# Patient Record
Sex: FEMALE | Race: WHITE | NOT HISPANIC OR LATINO | URBAN - METROPOLITAN AREA
[De-identification: names, ages, dates, MRNs, and addresses within clinical notes are randomized per-mention and may not be internally consistent; named-entity substitution may affect disease eponyms.]

---

## 2019-02-22 ENCOUNTER — CONVERSION ENCOUNTER (OUTPATIENT)
Dept: SURGERY | Facility: CLINIC | Age: 59
End: 2019-02-22

## 2019-02-22 ENCOUNTER — OFFICE VISIT CONVERTED (OUTPATIENT)
Dept: SURGERY | Facility: CLINIC | Age: 59
End: 2019-02-22
Attending: NURSE PRACTITIONER

## 2019-03-11 ENCOUNTER — HOSPITAL ENCOUNTER (OUTPATIENT)
Dept: GASTROENTEROLOGY | Facility: HOSPITAL | Age: 59
Setting detail: HOSPITAL OUTPATIENT SURGERY
Discharge: HOME | End: 2019-03-11
Attending: SURGERY

## 2019-03-14 ENCOUNTER — HOSPITAL ENCOUNTER (OUTPATIENT)
Dept: OTHER | Facility: HOSPITAL | Age: 59
Discharge: HOME | End: 2019-03-14
Attending: PODIATRIST

## 2019-03-14 LAB
ANION GAP SERPL CALC-SCNC: 19 MMOL/L (ref 8–19)
BASOPHILS # BLD AUTO: 0.08 10*3/UL (ref 0–0.2)
BASOPHILS NFR BLD AUTO: 0.9 % (ref 0–3)
BUN SERPL-MCNC: 15 MG/DL (ref 5–25)
BUN/CREAT SERPL: 14 {RATIO} (ref 6–20)
CALCIUM SERPL-MCNC: 9.6 MG/DL (ref 8.7–10.4)
CHLORIDE SERPL-SCNC: 104 MMOL/L (ref 99–111)
CONV ABS IMM GRAN: 0.09 10*3/UL (ref 0–0.2)
CONV CO2: 20 MMOL/L (ref 22–32)
CONV IMMATURE GRAN: 1 % (ref 0–1.8)
CREAT UR-MCNC: 1.11 MG/DL (ref 0.5–0.9)
DEPRECATED RDW RBC AUTO: 45.6 FL (ref 36.4–46.3)
EOSINOPHIL # BLD AUTO: 0.5 10*3/UL (ref 0–0.7)
EOSINOPHIL # BLD AUTO: 5.6 % (ref 0–7)
ERYTHROCYTE [DISTWIDTH] IN BLOOD BY AUTOMATED COUNT: 13.5 % (ref 11.7–14.4)
GFR SERPLBLD BASED ON 1.73 SQ M-ARVRAT: 55 ML/MIN/{1.73_M2}
GLUCOSE SERPL-MCNC: 156 MG/DL (ref 65–99)
HBA1C MFR BLD: 15.2 G/DL (ref 12–16)
HCT VFR BLD AUTO: 48.3 % (ref 37–47)
LYMPHOCYTES # BLD AUTO: 2.02 10*3/UL (ref 1–5)
MCH RBC QN AUTO: 29.3 PG (ref 27–31)
MCHC RBC AUTO-ENTMCNC: 31.5 G/DL (ref 33–37)
MCV RBC AUTO: 93.1 FL (ref 81–99)
MONOCYTES # BLD AUTO: 0.68 10*3/UL (ref 0.2–1.2)
MONOCYTES NFR BLD AUTO: 7.6 % (ref 3–10)
NEUTROPHILS # BLD AUTO: 5.54 10*3/UL (ref 2–8)
NEUTROPHILS NFR BLD AUTO: 62.2 % (ref 30–85)
NRBC CBCN: 0 % (ref 0–0.7)
OSMOLALITY SERPL CALC.SUM OF ELEC: 292 MOSM/KG (ref 273–304)
PLATELET # BLD AUTO: 352 10*3/UL (ref 130–400)
PMV BLD AUTO: 9.6 FL (ref 9.4–12.3)
POTASSIUM SERPL-SCNC: 4.2 MMOL/L (ref 3.5–5.3)
RBC # BLD AUTO: 5.19 10*6/UL (ref 4.2–5.4)
SODIUM SERPL-SCNC: 139 MMOL/L (ref 135–147)
VARIANT LYMPHS NFR BLD MANUAL: 22.7 % (ref 20–45)
WBC # BLD AUTO: 8.91 10*3/UL (ref 4.8–10.8)

## 2019-03-18 ENCOUNTER — OFFICE VISIT CONVERTED (OUTPATIENT)
Dept: SURGERY | Facility: CLINIC | Age: 59
End: 2019-03-18
Attending: SURGERY

## 2020-02-11 ENCOUNTER — HOSPITAL ENCOUNTER (OUTPATIENT)
Dept: URGENT CARE | Facility: CLINIC | Age: 60
Discharge: HOME | End: 2020-02-11
Attending: FAMILY MEDICINE

## 2020-06-01 ENCOUNTER — HOSPITAL ENCOUNTER (OUTPATIENT)
Dept: URGENT CARE | Facility: CLINIC | Age: 60
Discharge: HOME | End: 2020-06-01

## 2020-10-21 ENCOUNTER — OFFICE VISIT CONVERTED (OUTPATIENT)
Dept: INTERNAL MEDICINE | Facility: CLINIC | Age: 60
End: 2020-10-21
Attending: NURSE PRACTITIONER

## 2020-10-21 ENCOUNTER — HOSPITAL ENCOUNTER (OUTPATIENT)
Dept: OTHER | Facility: HOSPITAL | Age: 60
Discharge: HOME | End: 2020-10-21
Attending: NURSE PRACTITIONER

## 2020-10-21 ENCOUNTER — CONVERSION ENCOUNTER (OUTPATIENT)
Dept: INTERNAL MEDICINE | Facility: CLINIC | Age: 60
End: 2020-10-21

## 2020-10-21 LAB
ALBUMIN SERPL-MCNC: 3.8 G/DL (ref 3.5–5)
ALBUMIN/GLOB SERPL: 1 {RATIO} (ref 1.4–2.6)
ALP SERPL-CCNC: 237 U/L (ref 53–141)
ALT SERPL-CCNC: 152 U/L (ref 10–40)
ANION GAP SERPL CALC-SCNC: 21 MMOL/L (ref 8–19)
AST SERPL-CCNC: 209 U/L (ref 15–50)
BASOPHILS # BLD AUTO: 0.07 10*3/UL (ref 0–0.2)
BASOPHILS NFR BLD AUTO: 0.6 % (ref 0–3)
BILIRUB SERPL-MCNC: 0.68 MG/DL (ref 0.2–1.3)
BUN SERPL-MCNC: 4 MG/DL (ref 5–25)
BUN/CREAT SERPL: 4 {RATIO} (ref 6–20)
CALCIUM SERPL-MCNC: 10.1 MG/DL (ref 8.7–10.4)
CHLORIDE SERPL-SCNC: 82 MMOL/L (ref 99–111)
CONV ABS IMM GRAN: 0.07 10*3/UL (ref 0–0.2)
CONV CO2: 31 MMOL/L (ref 22–32)
CONV IMMATURE GRAN: 0.6 % (ref 0–1.8)
CONV TOTAL PROTEIN: 7.6 G/DL (ref 6.3–8.2)
CREAT UR-MCNC: 1.09 MG/DL (ref 0.5–0.9)
DEPRECATED RDW RBC AUTO: 47.8 FL (ref 36.4–46.3)
EOSINOPHIL # BLD AUTO: 0.07 10*3/UL (ref 0–0.7)
EOSINOPHIL # BLD AUTO: 0.6 % (ref 0–7)
ERYTHROCYTE [DISTWIDTH] IN BLOOD BY AUTOMATED COUNT: 14 % (ref 11.7–14.4)
FOLATE SERPL-MCNC: >20 NG/ML (ref 4.8–20)
GFR SERPLBLD BASED ON 1.73 SQ M-ARVRAT: 56 ML/MIN/{1.73_M2}
GLOBULIN UR ELPH-MCNC: 3.8 G/DL (ref 2–3.5)
GLUCOSE SERPL-MCNC: 123 MG/DL (ref 65–99)
HCT VFR BLD AUTO: 47.4 % (ref 37–47)
HGB BLD-MCNC: 16 G/DL (ref 12–16)
LYMPHOCYTES # BLD AUTO: 3.09 10*3/UL (ref 1–5)
LYMPHOCYTES NFR BLD AUTO: 25.1 % (ref 20–45)
MCH RBC QN AUTO: 31.6 PG (ref 27–31)
MCHC RBC AUTO-ENTMCNC: 33.8 G/DL (ref 33–37)
MCV RBC AUTO: 93.7 FL (ref 81–99)
MONOCYTES # BLD AUTO: 0.65 10*3/UL (ref 0.2–1.2)
MONOCYTES NFR BLD AUTO: 5.3 % (ref 3–10)
NEUTROPHILS # BLD AUTO: 8.35 10*3/UL (ref 2–8)
NEUTROPHILS NFR BLD AUTO: 67.8 % (ref 30–85)
NRBC CBCN: 0 % (ref 0–0.7)
OSMOLALITY SERPL CALC.SUM OF ELEC: 270 MOSM/KG (ref 273–304)
PLATELET # BLD AUTO: 447 10*3/UL (ref 130–400)
PMV BLD AUTO: 10.1 FL (ref 9.4–12.3)
POTASSIUM SERPL-SCNC: 2.6 MMOL/L (ref 3.5–5.3)
RBC # BLD AUTO: 5.06 10*6/UL (ref 4.2–5.4)
SODIUM SERPL-SCNC: 131 MMOL/L (ref 135–147)
T4 FREE SERPL-MCNC: 1.6 NG/DL (ref 0.9–1.8)
TSH SERPL-ACNC: 1.26 M[IU]/L (ref 0.27–4.2)
VIT B12 SERPL-MCNC: >2000 PG/ML (ref 211–911)
WBC # BLD AUTO: 12.3 10*3/UL (ref 4.8–10.8)

## 2020-10-22 LAB
IRON SATN MFR SERPL: 28 % (ref 20–55)
IRON SERPL-MCNC: 75 UG/DL (ref 60–170)
TIBC SERPL-MCNC: 270 UG/DL (ref 245–450)
TRANSFERRIN SERPL-MCNC: 189 MG/DL (ref 250–380)

## 2020-10-23 LAB — HCV AB SER DONR QL: <0.1 S/CO RATIO (ref 0–0.9)

## 2020-10-24 LAB
CMV IGG CSF IA-ACNC: >10 U/ML
CMV IGM SERPL IA-ACNC: 10 AU/ML
CONV EBV EARLY ANTIGEN: <5 U/ML (ref 0–10.9)
CONV EBV NUCLEAR ANTIGEN: 270 U/ML (ref 0–21.9)
CONV EPSTEIN BARR VIRAL CAPSID IGM: <10 U/ML (ref 0–43.9)
CONV EPSTEIN BARR VIRUS ANTIBODY TO VIRAL CAPSID IGG: 284 U/ML (ref 0–21.9)
EST. AVERAGE GLUCOSE BLD GHB EST-MCNC: 123 MG/DL
HBA1C MFR BLD: 5.9 % (ref 3.5–5.7)

## 2020-10-29 ENCOUNTER — HOSPITAL ENCOUNTER (OUTPATIENT)
Dept: CARDIOLOGY | Facility: HOSPITAL | Age: 60
Discharge: HOME | End: 2020-10-29
Attending: NURSE PRACTITIONER

## 2020-11-10 ENCOUNTER — OFFICE VISIT CONVERTED (OUTPATIENT)
Dept: INTERNAL MEDICINE | Facility: CLINIC | Age: 60
End: 2020-11-10
Attending: NURSE PRACTITIONER

## 2020-11-10 ENCOUNTER — HOSPITAL ENCOUNTER (OUTPATIENT)
Dept: GENERAL RADIOLOGY | Facility: HOSPITAL | Age: 60
Discharge: HOME | End: 2020-11-10
Attending: NURSE PRACTITIONER

## 2020-11-10 LAB
ALBUMIN SERPL-MCNC: 3.5 G/DL (ref 3.5–5)
ALBUMIN/GLOB SERPL: 1 {RATIO} (ref 1.4–2.6)
ALP SERPL-CCNC: 153 U/L (ref 53–141)
ALT SERPL-CCNC: 69 U/L (ref 10–40)
ANION GAP SERPL CALC-SCNC: 15 MMOL/L (ref 8–19)
AST SERPL-CCNC: 86 U/L (ref 15–50)
BASOPHILS # BLD AUTO: 0.09 10*3/UL (ref 0–0.2)
BASOPHILS NFR BLD AUTO: 1 % (ref 0–3)
BILIRUB SERPL-MCNC: 0.51 MG/DL (ref 0.2–1.3)
BUN SERPL-MCNC: 4 MG/DL (ref 5–25)
BUN/CREAT SERPL: 4 {RATIO} (ref 6–20)
CALCIUM SERPL-MCNC: 10.5 MG/DL (ref 8.7–10.4)
CHLORIDE SERPL-SCNC: 100 MMOL/L (ref 99–111)
CONV ABS IMM GRAN: 0.07 10*3/UL (ref 0–0.2)
CONV CO2: 26 MMOL/L (ref 22–32)
CONV IMMATURE GRAN: 0.7 % (ref 0–1.8)
CONV TOTAL PROTEIN: 6.9 G/DL (ref 6.3–8.2)
CREAT UR-MCNC: 1.06 MG/DL (ref 0.5–0.9)
DEPRECATED RDW RBC AUTO: 48.9 FL (ref 36.4–46.3)
EOSINOPHIL # BLD AUTO: 0.19 10*3/UL (ref 0–0.7)
EOSINOPHIL # BLD AUTO: 2 % (ref 0–7)
ERYTHROCYTE [DISTWIDTH] IN BLOOD BY AUTOMATED COUNT: 14.1 % (ref 11.7–14.4)
GFR SERPLBLD BASED ON 1.73 SQ M-ARVRAT: 58 ML/MIN/{1.73_M2}
GLOBULIN UR ELPH-MCNC: 3.4 G/DL (ref 2–3.5)
GLUCOSE SERPL-MCNC: 89 MG/DL (ref 65–99)
HCT VFR BLD AUTO: 39.1 % (ref 37–47)
HGB BLD-MCNC: 13 G/DL (ref 12–16)
LYMPHOCYTES # BLD AUTO: 2.33 10*3/UL (ref 1–5)
LYMPHOCYTES NFR BLD AUTO: 24.9 % (ref 20–45)
MAGNESIUM SERPL-MCNC: 1.66 MG/DL (ref 1.6–2.3)
MCH RBC QN AUTO: 31.3 PG (ref 27–31)
MCHC RBC AUTO-ENTMCNC: 33.2 G/DL (ref 33–37)
MCV RBC AUTO: 94.2 FL (ref 81–99)
MONOCYTES # BLD AUTO: 0.65 10*3/UL (ref 0.2–1.2)
MONOCYTES NFR BLD AUTO: 6.9 % (ref 3–10)
NEUTROPHILS # BLD AUTO: 6.04 10*3/UL (ref 2–8)
NEUTROPHILS NFR BLD AUTO: 64.5 % (ref 30–85)
NRBC CBCN: 0 % (ref 0–0.7)
OSMOLALITY SERPL CALC.SUM OF ELEC: 280 MOSM/KG (ref 273–304)
PLATELET # BLD AUTO: 501 10*3/UL (ref 130–400)
PMV BLD AUTO: 9.1 FL (ref 9.4–12.3)
POTASSIUM SERPL-SCNC: 3.7 MMOL/L (ref 3.5–5.3)
RBC # BLD AUTO: 4.15 10*6/UL (ref 4.2–5.4)
SODIUM SERPL-SCNC: 137 MMOL/L (ref 135–147)
WBC # BLD AUTO: 9.37 10*3/UL (ref 4.8–10.8)

## 2020-11-18 ENCOUNTER — CONVERSION ENCOUNTER (OUTPATIENT)
Dept: CARDIOLOGY | Facility: CLINIC | Age: 60
End: 2020-11-18

## 2020-11-18 ENCOUNTER — OFFICE VISIT CONVERTED (OUTPATIENT)
Dept: CARDIOLOGY | Facility: CLINIC | Age: 60
End: 2020-11-18
Attending: INTERNAL MEDICINE

## 2020-11-20 ENCOUNTER — OFFICE VISIT CONVERTED (OUTPATIENT)
Dept: SURGERY | Facility: CLINIC | Age: 60
End: 2020-11-20
Attending: NURSE PRACTITIONER

## 2020-12-11 ENCOUNTER — CONVERSION ENCOUNTER (OUTPATIENT)
Dept: INTERNAL MEDICINE | Facility: CLINIC | Age: 60
End: 2020-12-11

## 2020-12-11 ENCOUNTER — HOSPITAL ENCOUNTER (OUTPATIENT)
Dept: OTHER | Facility: HOSPITAL | Age: 60
Discharge: HOME | End: 2020-12-11
Attending: NURSE PRACTITIONER

## 2020-12-11 ENCOUNTER — OFFICE VISIT CONVERTED (OUTPATIENT)
Dept: INTERNAL MEDICINE | Facility: CLINIC | Age: 60
End: 2020-12-11
Attending: NURSE PRACTITIONER

## 2020-12-11 LAB
ALBUMIN SERPL-MCNC: 3.9 G/DL (ref 3.5–5)
ALBUMIN/GLOB SERPL: 1.2 {RATIO} (ref 1.4–2.6)
ALP SERPL-CCNC: 154 U/L (ref 53–141)
ALT SERPL-CCNC: 66 U/L (ref 10–40)
ANION GAP SERPL CALC-SCNC: 19 MMOL/L (ref 8–19)
AST SERPL-CCNC: 78 U/L (ref 15–50)
BASOPHILS # BLD AUTO: 0.06 10*3/UL (ref 0–0.2)
BASOPHILS NFR BLD AUTO: 0.8 % (ref 0–3)
BILIRUB SERPL-MCNC: 0.6 MG/DL (ref 0.2–1.3)
BUN SERPL-MCNC: 9 MG/DL (ref 5–25)
BUN/CREAT SERPL: 9 {RATIO} (ref 6–20)
CALCIUM SERPL-MCNC: 9.5 MG/DL (ref 8.7–10.4)
CHLORIDE SERPL-SCNC: 96 MMOL/L (ref 99–111)
CHOLEST SERPL-MCNC: 217 MG/DL (ref 107–200)
CHOLEST/HDLC SERPL: 3.5 {RATIO} (ref 3–6)
CONV ABS IMM GRAN: 0.03 10*3/UL (ref 0–0.2)
CONV CO2: 23 MMOL/L (ref 22–32)
CONV IMMATURE GRAN: 0.4 % (ref 0–1.8)
CONV TOTAL PROTEIN: 7.2 G/DL (ref 6.3–8.2)
CREAT UR-MCNC: 0.98 MG/DL (ref 0.5–0.9)
DEPRECATED RDW RBC AUTO: 50.9 FL (ref 36.4–46.3)
EOSINOPHIL # BLD AUTO: 0.16 10*3/UL (ref 0–0.7)
EOSINOPHIL # BLD AUTO: 2 % (ref 0–7)
ERYTHROCYTE [DISTWIDTH] IN BLOOD BY AUTOMATED COUNT: 14.2 % (ref 11.7–14.4)
GFR SERPLBLD BASED ON 1.73 SQ M-ARVRAT: >60 ML/MIN/{1.73_M2}
GLOBULIN UR ELPH-MCNC: 3.3 G/DL (ref 2–3.5)
GLUCOSE SERPL-MCNC: 84 MG/DL (ref 65–99)
HCT VFR BLD AUTO: 45.3 % (ref 37–47)
HDLC SERPL-MCNC: 62 MG/DL (ref 40–60)
HGB BLD-MCNC: 14.6 G/DL (ref 12–16)
LDLC SERPL CALC-MCNC: 132 MG/DL (ref 70–100)
LYMPHOCYTES # BLD AUTO: 1.92 10*3/UL (ref 1–5)
LYMPHOCYTES NFR BLD AUTO: 24.3 % (ref 20–45)
MCH RBC QN AUTO: 31.6 PG (ref 27–31)
MCHC RBC AUTO-ENTMCNC: 32.2 G/DL (ref 33–37)
MCV RBC AUTO: 98.1 FL (ref 81–99)
MONOCYTES # BLD AUTO: 0.55 10*3/UL (ref 0.2–1.2)
MONOCYTES NFR BLD AUTO: 7 % (ref 3–10)
NEUTROPHILS # BLD AUTO: 5.17 10*3/UL (ref 2–8)
NEUTROPHILS NFR BLD AUTO: 65.5 % (ref 30–85)
NRBC CBCN: 0 % (ref 0–0.7)
OSMOLALITY SERPL CALC.SUM OF ELEC: 276 MOSM/KG (ref 273–304)
PLATELET # BLD AUTO: 456 10*3/UL (ref 130–400)
PMV BLD AUTO: 10.3 FL (ref 9.4–12.3)
POTASSIUM SERPL-SCNC: 4 MMOL/L (ref 3.5–5.3)
RBC # BLD AUTO: 4.62 10*6/UL (ref 4.2–5.4)
SODIUM SERPL-SCNC: 134 MMOL/L (ref 135–147)
TRIGL SERPL-MCNC: 115 MG/DL (ref 40–150)
VLDLC SERPL-MCNC: 23 MG/DL (ref 5–37)
WBC # BLD AUTO: 7.89 10*3/UL (ref 4.8–10.8)

## 2020-12-12 LAB
T4 FREE SERPL-MCNC: 1.6 NG/DL (ref 0.9–1.8)
TSH SERPL-ACNC: 1.67 M[IU]/L (ref 0.27–4.2)

## 2020-12-15 ENCOUNTER — HOSPITAL ENCOUNTER (OUTPATIENT)
Dept: OTHER | Facility: HOSPITAL | Age: 60
Discharge: HOME | End: 2020-12-15
Attending: NURSE PRACTITIONER

## 2020-12-15 ENCOUNTER — OFFICE VISIT CONVERTED (OUTPATIENT)
Dept: INTERNAL MEDICINE | Facility: CLINIC | Age: 60
End: 2020-12-15
Attending: NURSE PRACTITIONER

## 2020-12-19 LAB
CONV LAST MENSTURAL PERIOD: NORMAL
SPECIMEN SOURCE: NORMAL
SPECIMEN SOURCE: NORMAL
THIN PREP CVX: NORMAL

## 2021-01-19 ENCOUNTER — OFFICE VISIT CONVERTED (OUTPATIENT)
Dept: INTERNAL MEDICINE | Facility: CLINIC | Age: 61
End: 2021-01-19
Attending: PHYSICIAN ASSISTANT

## 2021-01-19 ENCOUNTER — HOSPITAL ENCOUNTER (OUTPATIENT)
Dept: OTHER | Facility: HOSPITAL | Age: 61
Discharge: HOME | End: 2021-01-19
Attending: PHYSICIAN ASSISTANT

## 2021-01-19 LAB
ALBUMIN SERPL-MCNC: 4.3 G/DL (ref 3.5–5)
ALBUMIN/GLOB SERPL: 1.1 {RATIO} (ref 1.4–2.6)
ALP SERPL-CCNC: 134 U/L (ref 53–141)
ALT SERPL-CCNC: 48 U/L (ref 10–40)
ANION GAP SERPL CALC-SCNC: 14 MMOL/L (ref 8–19)
AST SERPL-CCNC: 50 U/L (ref 15–50)
BASOPHILS # BLD AUTO: 0.09 10*3/UL (ref 0–0.2)
BASOPHILS NFR BLD AUTO: 1.1 % (ref 0–3)
BILIRUB SERPL-MCNC: 0.56 MG/DL (ref 0.2–1.3)
BUN SERPL-MCNC: 10 MG/DL (ref 5–25)
BUN/CREAT SERPL: 11 {RATIO} (ref 6–20)
CALCIUM SERPL-MCNC: 9.9 MG/DL (ref 8.7–10.4)
CHLORIDE SERPL-SCNC: 92 MMOL/L (ref 99–111)
CHOLEST SERPL-MCNC: 250 MG/DL (ref 107–200)
CHOLEST/HDLC SERPL: 4.9 {RATIO} (ref 3–6)
CONV ABS IMM GRAN: 0.02 10*3/UL (ref 0–0.2)
CONV CO2: 31 MMOL/L (ref 22–32)
CONV IMMATURE GRAN: 0.3 % (ref 0–1.8)
CONV TOTAL PROTEIN: 8.2 G/DL (ref 6.3–8.2)
CREAT UR-MCNC: 0.87 MG/DL (ref 0.5–0.9)
DEPRECATED RDW RBC AUTO: 44.4 FL (ref 36.4–46.3)
EOSINOPHIL # BLD AUTO: 0.14 10*3/UL (ref 0–0.7)
EOSINOPHIL # BLD AUTO: 1.8 % (ref 0–7)
ERYTHROCYTE [DISTWIDTH] IN BLOOD BY AUTOMATED COUNT: 13.1 % (ref 11.7–14.4)
GFR SERPLBLD BASED ON 1.73 SQ M-ARVRAT: >60 ML/MIN/{1.73_M2}
GLOBULIN UR ELPH-MCNC: 3.9 G/DL (ref 2–3.5)
GLUCOSE SERPL-MCNC: 90 MG/DL (ref 65–99)
HCT VFR BLD AUTO: 49.1 % (ref 37–47)
HDLC SERPL-MCNC: 51 MG/DL (ref 40–60)
HGB BLD-MCNC: 16.6 G/DL (ref 12–16)
LDLC SERPL CALC-MCNC: 168 MG/DL (ref 70–100)
LYMPHOCYTES # BLD AUTO: 1.73 10*3/UL (ref 1–5)
LYMPHOCYTES NFR BLD AUTO: 22 % (ref 20–45)
MCH RBC QN AUTO: 31.3 PG (ref 27–31)
MCHC RBC AUTO-ENTMCNC: 33.8 G/DL (ref 33–37)
MCV RBC AUTO: 92.5 FL (ref 81–99)
MONOCYTES # BLD AUTO: 0.55 10*3/UL (ref 0.2–1.2)
MONOCYTES NFR BLD AUTO: 7 % (ref 3–10)
NEUTROPHILS # BLD AUTO: 5.35 10*3/UL (ref 2–8)
NEUTROPHILS NFR BLD AUTO: 67.8 % (ref 30–85)
NRBC CBCN: 0 % (ref 0–0.7)
OSMOLALITY SERPL CALC.SUM OF ELEC: 277 MOSM/KG (ref 273–304)
PLATELET # BLD AUTO: 386 10*3/UL (ref 130–400)
PMV BLD AUTO: 10.7 FL (ref 9.4–12.3)
POTASSIUM SERPL-SCNC: 3.4 MMOL/L (ref 3.5–5.3)
RBC # BLD AUTO: 5.31 10*6/UL (ref 4.2–5.4)
SODIUM SERPL-SCNC: 134 MMOL/L (ref 135–147)
TRIGL SERPL-MCNC: 153 MG/DL (ref 40–150)
TSH SERPL-ACNC: 1.16 M[IU]/L (ref 0.27–4.2)
VLDLC SERPL-MCNC: 31 MG/DL (ref 5–37)
WBC # BLD AUTO: 7.88 10*3/UL (ref 4.8–10.8)

## 2021-02-05 ENCOUNTER — HOSPITAL ENCOUNTER (OUTPATIENT)
Dept: OTHER | Facility: HOSPITAL | Age: 61
Discharge: HOME | End: 2021-02-05
Attending: PHYSICIAN ASSISTANT

## 2021-02-05 ENCOUNTER — HOSPITAL ENCOUNTER (OUTPATIENT)
Dept: MAMMOGRAPHY | Facility: HOSPITAL | Age: 61
Discharge: HOME | End: 2021-02-05
Attending: NURSE PRACTITIONER

## 2021-02-05 LAB
ANION GAP SERPL CALC-SCNC: 15 MMOL/L (ref 8–19)
BUN SERPL-MCNC: 9 MG/DL (ref 5–25)
BUN/CREAT SERPL: 9 {RATIO} (ref 6–20)
CALCIUM SERPL-MCNC: 9.8 MG/DL (ref 8.7–10.4)
CHLORIDE SERPL-SCNC: 92 MMOL/L (ref 99–111)
CONV CO2: 30 MMOL/L (ref 22–32)
CREAT UR-MCNC: 1.01 MG/DL (ref 0.5–0.9)
GFR SERPLBLD BASED ON 1.73 SQ M-ARVRAT: >60 ML/MIN/{1.73_M2}
GLUCOSE SERPL-MCNC: 108 MG/DL (ref 65–99)
OSMOLALITY SERPL CALC.SUM OF ELEC: 277 MOSM/KG (ref 273–304)
POTASSIUM SERPL-SCNC: 3.1 MMOL/L (ref 3.5–5.3)
SODIUM SERPL-SCNC: 134 MMOL/L (ref 135–147)

## 2021-02-09 ENCOUNTER — OFFICE VISIT CONVERTED (OUTPATIENT)
Dept: INTERNAL MEDICINE | Facility: CLINIC | Age: 61
End: 2021-02-09
Attending: NURSE PRACTITIONER

## 2021-02-09 ENCOUNTER — CONVERSION ENCOUNTER (OUTPATIENT)
Dept: INTERNAL MEDICINE | Facility: CLINIC | Age: 61
End: 2021-02-09

## 2021-02-17 ENCOUNTER — HOSPITAL ENCOUNTER (OUTPATIENT)
Dept: PREADMISSION TESTING | Facility: HOSPITAL | Age: 61
Discharge: HOME | End: 2021-02-17
Attending: SURGERY

## 2021-02-17 LAB — SARS-COV-2 RNA SPEC QL NAA+PROBE: NOT DETECTED

## 2021-02-22 ENCOUNTER — HOSPITAL ENCOUNTER (OUTPATIENT)
Dept: GASTROENTEROLOGY | Facility: HOSPITAL | Age: 61
Setting detail: HOSPITAL OUTPATIENT SURGERY
Discharge: HOME | End: 2021-02-22
Attending: SURGERY

## 2021-03-04 ENCOUNTER — HOSPITAL ENCOUNTER (OUTPATIENT)
Dept: URGENT CARE | Facility: CLINIC | Age: 61
Discharge: HOME | End: 2021-03-04
Attending: FAMILY MEDICINE

## 2021-03-09 ENCOUNTER — HOSPITAL ENCOUNTER (OUTPATIENT)
Dept: OTHER | Facility: HOSPITAL | Age: 61
Discharge: HOME | End: 2021-03-09
Attending: NURSE PRACTITIONER

## 2021-03-09 ENCOUNTER — OFFICE VISIT CONVERTED (OUTPATIENT)
Dept: INTERNAL MEDICINE | Facility: CLINIC | Age: 61
End: 2021-03-09
Attending: NURSE PRACTITIONER

## 2021-03-09 LAB
ALBUMIN SERPL-MCNC: 4.1 G/DL (ref 3.5–5)
ALBUMIN/GLOB SERPL: 1.1 {RATIO} (ref 1.4–2.6)
ALP SERPL-CCNC: 126 U/L (ref 53–141)
ALT SERPL-CCNC: 34 U/L (ref 10–40)
ANION GAP SERPL CALC-SCNC: 16 MMOL/L (ref 8–19)
AST SERPL-CCNC: 43 U/L (ref 15–50)
BASOPHILS # BLD AUTO: 0.08 10*3/UL (ref 0–0.2)
BASOPHILS NFR BLD AUTO: 1 % (ref 0–3)
BILIRUB SERPL-MCNC: 0.5 MG/DL (ref 0.2–1.3)
BUN SERPL-MCNC: 14 MG/DL (ref 5–25)
BUN/CREAT SERPL: 17 {RATIO} (ref 6–20)
CALCIUM SERPL-MCNC: 9.8 MG/DL (ref 8.7–10.4)
CHLORIDE SERPL-SCNC: 89 MMOL/L (ref 99–111)
CONV ABS IMM GRAN: 0.03 10*3/UL (ref 0–0.2)
CONV CO2: 32 MMOL/L (ref 22–32)
CONV IMMATURE GRAN: 0.4 % (ref 0–1.8)
CONV TOTAL PROTEIN: 7.7 G/DL (ref 6.3–8.2)
CREAT UR-MCNC: 0.81 MG/DL (ref 0.5–0.9)
DEPRECATED RDW RBC AUTO: 43.5 FL (ref 36.4–46.3)
EOSINOPHIL # BLD AUTO: 0.07 10*3/UL (ref 0–0.7)
EOSINOPHIL # BLD AUTO: 0.8 % (ref 0–7)
ERYTHROCYTE [DISTWIDTH] IN BLOOD BY AUTOMATED COUNT: 13.2 % (ref 11.7–14.4)
GFR SERPLBLD BASED ON 1.73 SQ M-ARVRAT: >60 ML/MIN/{1.73_M2}
GLOBULIN UR ELPH-MCNC: 3.6 G/DL (ref 2–3.5)
GLUCOSE SERPL-MCNC: 103 MG/DL (ref 65–99)
HCT VFR BLD AUTO: 49.4 % (ref 37–47)
HGB BLD-MCNC: 16.5 G/DL (ref 12–16)
LYMPHOCYTES # BLD AUTO: 2.26 10*3/UL (ref 1–5)
LYMPHOCYTES NFR BLD AUTO: 26.9 % (ref 20–45)
MCH RBC QN AUTO: 30.3 PG (ref 27–31)
MCHC RBC AUTO-ENTMCNC: 33.4 G/DL (ref 33–37)
MCV RBC AUTO: 90.6 FL (ref 81–99)
MONOCYTES # BLD AUTO: 0.58 10*3/UL (ref 0.2–1.2)
MONOCYTES NFR BLD AUTO: 6.9 % (ref 3–10)
NEUTROPHILS # BLD AUTO: 5.37 10*3/UL (ref 2–8)
NEUTROPHILS NFR BLD AUTO: 64 % (ref 30–85)
NRBC CBCN: 0 % (ref 0–0.7)
OSMOLALITY SERPL CALC.SUM OF ELEC: 279 MOSM/KG (ref 273–304)
PLATELET # BLD AUTO: 458 10*3/UL (ref 130–400)
PMV BLD AUTO: 10 FL (ref 9.4–12.3)
POTASSIUM SERPL-SCNC: 3.1 MMOL/L (ref 3.5–5.3)
RBC # BLD AUTO: 5.45 10*6/UL (ref 4.2–5.4)
SODIUM SERPL-SCNC: 134 MMOL/L (ref 135–147)
WBC # BLD AUTO: 8.39 10*3/UL (ref 4.8–10.8)

## 2021-03-12 ENCOUNTER — HOSPITAL ENCOUNTER (OUTPATIENT)
Dept: OTHER | Facility: HOSPITAL | Age: 61
Discharge: HOME | End: 2021-03-12
Attending: NURSE PRACTITIONER

## 2021-03-12 ENCOUNTER — CONVERSION ENCOUNTER (OUTPATIENT)
Dept: INTERNAL MEDICINE | Facility: CLINIC | Age: 61
End: 2021-03-12

## 2021-03-12 LAB
ALBUMIN SERPL-MCNC: 4.3 G/DL (ref 3.5–5)
ALBUMIN/GLOB SERPL: 1.4 {RATIO} (ref 1.4–2.6)
ALP SERPL-CCNC: 142 U/L (ref 53–141)
ALT SERPL-CCNC: 30 U/L (ref 10–40)
ANION GAP SERPL CALC-SCNC: 16 MMOL/L (ref 8–19)
AST SERPL-CCNC: 32 U/L (ref 15–50)
B-HEM STREP SPEC QL CULT: NEGATIVE
BASOPHILS # BLD AUTO: 0.08 10*3/UL (ref 0–0.2)
BASOPHILS NFR BLD AUTO: 0.9 % (ref 0–3)
BILIRUB SERPL-MCNC: 0.53 MG/DL (ref 0.2–1.3)
BUN SERPL-MCNC: 8 MG/DL (ref 5–25)
BUN/CREAT SERPL: 8 {RATIO} (ref 6–20)
CALCIUM SERPL-MCNC: 10 MG/DL (ref 8.7–10.4)
CHLORIDE SERPL-SCNC: 93 MMOL/L (ref 99–111)
CONV ABS IMM GRAN: 0.05 10*3/UL (ref 0–0.2)
CONV CO2: 32 MMOL/L (ref 22–32)
CONV IMMATURE GRAN: 0.5 % (ref 0–1.8)
CONV TOTAL PROTEIN: 7.4 G/DL (ref 6.3–8.2)
CREAT UR-MCNC: 1 MG/DL (ref 0.5–0.9)
DEPRECATED RDW RBC AUTO: 42.6 FL (ref 36.4–46.3)
EOSINOPHIL # BLD AUTO: 0.08 10*3/UL (ref 0–0.7)
EOSINOPHIL # BLD AUTO: 0.9 % (ref 0–7)
ERYTHROCYTE [DISTWIDTH] IN BLOOD BY AUTOMATED COUNT: 13.2 % (ref 11.7–14.4)
FLUAV RNA SPEC QL NAA+PROBE: NEGATIVE
FLUBV RNA SPEC QL NAA+PROBE: NEGATIVE
GFR SERPLBLD BASED ON 1.73 SQ M-ARVRAT: >60 ML/MIN/{1.73_M2}
GLOBULIN UR ELPH-MCNC: 3.1 G/DL (ref 2–3.5)
GLUCOSE SERPL-MCNC: 128 MG/DL (ref 65–99)
HCT VFR BLD AUTO: 49.2 % (ref 37–47)
HGB BLD-MCNC: 16.6 G/DL (ref 12–16)
LYMPHOCYTES # BLD AUTO: 1.97 10*3/UL (ref 1–5)
LYMPHOCYTES NFR BLD AUTO: 21 % (ref 20–45)
MCH RBC QN AUTO: 30.5 PG (ref 27–31)
MCHC RBC AUTO-ENTMCNC: 33.7 G/DL (ref 33–37)
MCV RBC AUTO: 90.3 FL (ref 81–99)
MONOCYTES # BLD AUTO: 0.79 10*3/UL (ref 0.2–1.2)
MONOCYTES NFR BLD AUTO: 8.4 % (ref 3–10)
NEUTROPHILS # BLD AUTO: 6.39 10*3/UL (ref 2–8)
NEUTROPHILS NFR BLD AUTO: 68.3 % (ref 30–85)
NRBC CBCN: 0 % (ref 0–0.7)
OSMOLALITY SERPL CALC.SUM OF ELEC: 286 MOSM/KG (ref 273–304)
PLATELET # BLD AUTO: 476 10*3/UL (ref 130–400)
PMV BLD AUTO: 9.8 FL (ref 9.4–12.3)
POTASSIUM SERPL-SCNC: 3.4 MMOL/L (ref 3.5–5.3)
RBC # BLD AUTO: 5.45 10*6/UL (ref 4.2–5.4)
SODIUM SERPL-SCNC: 138 MMOL/L (ref 135–147)
WBC # BLD AUTO: 9.36 10*3/UL (ref 4.8–10.8)

## 2021-03-15 LAB — EPO SERPL-ACNC: 7.9 MIU/ML (ref 2.6–18.5)

## 2021-03-17 LAB — BACTERIA SPEC AEROBE CULT: ABNORMAL

## 2021-04-09 ENCOUNTER — HOSPITAL ENCOUNTER (OUTPATIENT)
Dept: OTHER | Facility: HOSPITAL | Age: 61
Discharge: HOME | End: 2021-04-09
Attending: NURSE PRACTITIONER

## 2021-04-09 LAB
ALBUMIN SERPL-MCNC: 3.9 G/DL (ref 3.5–5)
ALBUMIN/GLOB SERPL: 1.1 {RATIO} (ref 1.4–2.6)
ALP SERPL-CCNC: 133 U/L (ref 53–141)
ALT SERPL-CCNC: 33 U/L (ref 10–40)
ANION GAP SERPL CALC-SCNC: 20 MMOL/L (ref 8–19)
AST SERPL-CCNC: 41 U/L (ref 15–50)
BILIRUB SERPL-MCNC: 0.58 MG/DL (ref 0.2–1.3)
BUN SERPL-MCNC: 7 MG/DL (ref 5–25)
BUN/CREAT SERPL: 7 {RATIO} (ref 6–20)
CALCIUM SERPL-MCNC: 9.7 MG/DL (ref 8.7–10.4)
CHLORIDE SERPL-SCNC: 98 MMOL/L (ref 99–111)
CONV CO2: 25 MMOL/L (ref 22–32)
CONV TOTAL PROTEIN: 7.5 G/DL (ref 6.3–8.2)
CREAT UR-MCNC: 0.95 MG/DL (ref 0.5–0.9)
GFR SERPLBLD BASED ON 1.73 SQ M-ARVRAT: >60 ML/MIN/{1.73_M2}
GLOBULIN UR ELPH-MCNC: 3.6 G/DL (ref 2–3.5)
GLUCOSE SERPL-MCNC: 188 MG/DL (ref 65–99)
OSMOLALITY SERPL CALC.SUM OF ELEC: 293 MOSM/KG (ref 273–304)
POTASSIUM SERPL-SCNC: 3.1 MMOL/L (ref 3.5–5.3)
SODIUM SERPL-SCNC: 140 MMOL/L (ref 135–147)

## 2021-04-19 ENCOUNTER — OFFICE VISIT CONVERTED (OUTPATIENT)
Dept: INTERNAL MEDICINE | Facility: CLINIC | Age: 61
End: 2021-04-19
Attending: NURSE PRACTITIONER

## 2021-04-19 ENCOUNTER — CONVERSION ENCOUNTER (OUTPATIENT)
Dept: INTERNAL MEDICINE | Facility: CLINIC | Age: 61
End: 2021-04-19

## 2021-04-19 ENCOUNTER — HOSPITAL ENCOUNTER (OUTPATIENT)
Dept: OTHER | Facility: HOSPITAL | Age: 61
Discharge: HOME | End: 2021-04-19
Attending: NURSE PRACTITIONER

## 2021-04-19 LAB
ALBUMIN SERPL-MCNC: 3.6 G/DL (ref 3.5–5)
ALBUMIN/GLOB SERPL: 1.1 {RATIO} (ref 1.4–2.6)
ALP SERPL-CCNC: 123 U/L (ref 53–141)
ALT SERPL-CCNC: 26 U/L (ref 10–40)
ANION GAP SERPL CALC-SCNC: 19 MMOL/L (ref 8–19)
APPEARANCE UR: CLEAR
AST SERPL-CCNC: 41 U/L (ref 15–50)
BILIRUB SERPL-MCNC: 0.3 MG/DL (ref 0.2–1.3)
BILIRUB UR QL STRIP: NEGATIVE
BILIRUB UR QL: NEGATIVE
BUN SERPL-MCNC: 7 MG/DL (ref 5–25)
BUN/CREAT SERPL: 8 {RATIO} (ref 6–20)
CALCIUM SERPL-MCNC: 9.3 MG/DL (ref 8.7–10.4)
CHLORIDE SERPL-SCNC: 94 MMOL/L (ref 99–111)
COLOR UR: YELLOW
COLOR UR: YELLOW
CONV BACTERIA: ABNORMAL
CONV CLARITY OF URINE: CLEAR
CONV CO2: 24 MMOL/L (ref 22–32)
CONV COLLECTION SOURCE (UA): ABNORMAL
CONV PROTEIN IN URINE BY AUTOMATED TEST STRIP: NORMAL
CONV TOTAL PROTEIN: 7 G/DL (ref 6.3–8.2)
CONV UROBILINOGEN IN URINE BY AUTOMATED TEST STRIP: 1 {EHRLICHU}/DL (ref 0.1–1)
CONV UROBILINOGEN IN URINE BY AUTOMATED TEST STRIP: NORMAL
CREAT UR-MCNC: 0.89 MG/DL (ref 0.5–0.9)
GFR SERPLBLD BASED ON 1.73 SQ M-ARVRAT: >60 ML/MIN/{1.73_M2}
GLOBULIN UR ELPH-MCNC: 3.4 G/DL (ref 2–3.5)
GLUCOSE SERPL-MCNC: 135 MG/DL (ref 65–99)
GLUCOSE UR QL: NEGATIVE
GLUCOSE UR QL: NEGATIVE MG/DL
HGB UR QL STRIP: ABNORMAL
HGB UR QL STRIP: NORMAL
KETONES UR QL STRIP: NEGATIVE
KETONES UR QL STRIP: NEGATIVE MG/DL
LEUKOCYTE ESTERASE UR QL STRIP: ABNORMAL
LEUKOCYTE ESTERASE UR QL STRIP: NORMAL
NITRITE UR QL STRIP: NEGATIVE
NITRITE UR QL STRIP: NEGATIVE
OSMOLALITY SERPL CALC.SUM OF ELEC: 276 MOSM/KG (ref 273–304)
PH UR STRIP.AUTO: 7 [PH]
PH UR STRIP.AUTO: 7.5 [PH] (ref 5–8)
POTASSIUM SERPL-SCNC: 3.7 MMOL/L (ref 3.5–5.3)
PROT UR QL: ABNORMAL MG/DL
RBC #/AREA URNS HPF: ABNORMAL /[HPF]
SODIUM SERPL-SCNC: 133 MMOL/L (ref 135–147)
SP GR UR: 1.01
SP GR UR: 1.01 (ref 1–1.03)
SQUAMOUS SPT QL MICRO: ABNORMAL /[HPF]
WBC #/AREA URNS HPF: ABNORMAL /[HPF]

## 2021-04-22 LAB
AMPICILLIN SUSC ISLT: >=32
AMPICILLIN+SULBAC SUSC ISLT: 16
BACTERIA UR CULT: ABNORMAL
BASOPHILS # BLD: 1 % (ref 0–3)
CEFAZOLIN SUSC ISLT: <=4
CEFEPIME SUSC ISLT: <=0.12
CEFTAZIDIME SUSC ISLT: <=1
CEFTRIAXONE SUSC ISLT: <=0.25
CIPROFLOXACIN SUSC ISLT: <=0.25
CONV ABS BANDS: 0 % (ref 1–5)
CONV SEGMENTED NEUTROPHILS: 62 % (ref 45–70)
EOSINOPHIL NFR BLD AUTO: 0 % (ref 0–7)
ERTAPENEM SUSC ISLT: <=0.12
GENTAMICIN SUSC ISLT: <=1
LEVOFLOXACIN SUSC ISLT: <=0.12
MONOCYTES NFR BLD MANUAL: 5 % (ref 3–10)
NITROFURANTOIN SUSC ISLT: <=16
NUC CELL # PRT MANUAL: 0 /100{WBCS}
PATHOLOGY REVIEW: NORMAL
PIP+TAZO SUSC ISLT: <=4
PLAT MORPH BLD: NORMAL
SMALL PLATELETS BLD QL SMEAR: ADEQUATE
TMP SMX SUSC ISLT: >=320
TOBRAMYCIN SUSC ISLT: <=1
VARIANT LYMPHS NFR BLD MANUAL: 32 % (ref 20–45)

## 2021-05-10 NOTE — H&P
History and Physical      Patient Name: Shanthi Akers   Patient ID: [id number]   Sex: Female   YOB: 1960    Primary Care Provider: Charo PATEL   Referring Provider: Charo PATEL    Visit Date: November 20, 2020    Provider: AMANDA Perez   Location: [facility]   Location Address: [address] [place] [address]  [id number]   Location Phone: [phone]          Chief Complaint  · Age 50 or over  · Here today for a pre-surgical colon screening visit  · Nausea  · Vomiting  · Difficulty Swallowing  · Personal History of Polyps  · Requesting EGD and Colonoscopy      History Of Present Illness  The patient is a 60 year old /White female presenting to the Surgical Specialist office on a referral from Charo PATEL.   Shanthi Akers needs to have a diagnostic colonoscopy and EGD.   Patient states that they have had a colonoscopy. 1 year ago   Patient currently complains of: N/V and difficulty swallowing   Patient Does not have family history of colon cancer.      Patient presents today on referral from Jefe Castillo for nausea & vomiting, dysphagia and for screening colonoscopy.  Patient reports that she has nausea and vomiting times last 3 months.  She reports that she vomits after eating.  Reports vomiting at random times during the day.  Unsure what is causing her vomiting.  She denies any abdominal pain, diarrhea, or rectal bleeding.  She reports that she was started on omeprazole and that did not help the vomiting.  Denies any family history of colorectal cancer.  Admits to history of colonic polyps.    3/19 EGD & Colonoscopy (Sp): Rogel's; Transverse - tubular adenoma; Rectum - 2 Tubulovillous adenomas.     4/14 EGD (Maria Ines): Fundic gland polyps; esophageal stricture with dilation; Rogel's.    8/13: Colonoscopy (Maria Ines): Rectum - Tubulovillous adenomas.    5/13: Colonoscopy (Maria Ines): Transverse - Tubulovillous adenoma; Rectum  - tubular adenoma.       Past Medical History  Disease Name Date Onset Notes   Allergic rhinitis, chronic --  --    Asthma --  --    Broken Bones --  --    Colon cancer screening --  --    Depression with anxiety --  --    Essential hypertension --  --    GERD --  --    Hyperlipidemia --  --    Migraine headache --  --    Psychiatric disorder --  --    Shortness of Breath --  --    Sinus trouble --  --          Past Surgical History  Procedure Name Date Notes   Colon --  --    EGD --  --          Medication List  Name Date Started Instructions   Advair Diskus 500-50 mcg/dose inhalation blister with device 10/21/2020 inhale 1 puff by inhalation route 2 times per day in the morning and evening approximately 12 hours apart   aspirin 81 mg oral tablet,chewable  chew 1 tablet (81 mg) by oral route once daily   biotin 5 mg oral capsule  take 1 capsule by oral route daily   Klonopin 1 mg oral tablet  take 1 tablet by oral route As needed   meclizine 25 mg oral tablet  take 1 tablet (25 mg) by oral route 3 times per day as needed   Miralax 17 gram oral powder in packet  take 1 packet (17 gram) mixed with 8 oz. water, juice, soda, coffee or tea by oral route BID as needed   Omega-3 Fish Oil 300-1,000 mg oral capsule  take 1 capsule by oral route daily   omeprazole 40 mg oral capsule,delayed release(DR/EC) 11/10/2020 take 1 capsule (40 mg) by oral route once daily before a meal   Paxil 40 mg oral tablet  take 1 tablet (40 mg) by oral route once daily   Prilosec OTC 20 mg oral tablet,delayed release (DR/EC)  take 1 tablet by oral route 2 times a day   Ventolin HFA 90 mcg/actuation inhalation HFA aerosol inhaler  inhale 2 puffs (180 mcg) by inhalation route every 6 hours   Vitamins and Minerals oral tablet  take 1 tablet by oral route daily for 60 days   Zyprexa oral  --          Allergy List  Allergen Name Date Reaction Notes   tetracycline --  rash --        Allergies Reconciled  Family Medical History  Disease Name  "Relative/Age Notes   Melanoma Brother/   --    -Father's Family History Unknown Father/   Father   Family history of stroke Mother/   --    Family history of heart attack Mother/   --          Reproductive History  Menstrual   Pregnancy Summary   Total Pregnancies: 0 Full Term: 0 Premature: 0   Ab Induced: 0 Ab Spontaneous: 0 Ectopics: 0   Multiples: 0 Livin         Social History  Finding Status Start/Stop Quantity Notes   Alcohol Current some day --/-- --  drinks daily; wine and beer   Caffeine Current every day --/-- --  drinks regularly; 1-2 times per day   Second hand smoke exposure Never --/-- --  no   Tobacco Never --/-- --  never a smoker         Review of Systems  · Constitutional  o Denies  o : fever, chills  · Eyes  o Denies  o : yellowish discoloration of eyes  · HENT  o Denies  o : difficulty swallowing  · Cardiovascular  o Denies  o : chest pain, chest pain on exertion  · Respiratory  o Denies  o : shortness of breath  · Gastrointestinal  o Admits  o : nausea, vomiting, dysphagia  o Denies  o : diarrhea, constipation  · Genitourinary  o Denies  o : abnormal color of urine  · Integument  o Denies  o : rash  · Neurologic  o Denies  o : tingling or numbness  · Musculoskeletal  o Denies  o : joint pain  · Endocrine  o Denies  o : weight gain, weight loss      Vitals  Date Time BP Position Site L\R Cuff Size HR RR TEMP (F) WT  HT  BMI kg/m2 BSA m2 O2 Sat FR L/min FiO2        2020 11:41 AM       16  181lbs 0oz 5'  7.5\" 27.93 1.98             Physical Examination  · Constitutional  o Appearance  o : well developed, well-nourished, patient in no apparent distress  · Head and Face  o Head  o :   § Inspection  § : atraumatic, normocephalic  o Face  o :   § Inspection  § : no facial lesions  · Eyes  o Conjunctivae  o : conjunctivae normal  o Sclerae  o : sclerae white  · Neck  o Inspection/Palpation  o : normal appearance, no masses or tenderness, trachea midline  · Respiratory  o Respiratory " Effort  o : breathing unlabored  · Skin and Subcutaneous Tissue  o General Inspection  o : no lesions present, no areas of discoloration, skin turgor normal, texture normal  · Neurologic  o Mental Status Examination  o :   § Orientation  § : grossly oriented to person, place and time  § Attention  § : attention normal, concentration abilities normal  § Fund of Knowledge  § : fund of knowledge within normal limits, patient aware of current events  o Gait and Station  o : normal gait, able to stand without difficulty  · Psychiatric  o Judgement and Insight  o : judgment and insight intact  o Mood and Affect  o : mood normal, affect appropriate              Assessment  · Difficulty in swallowing     787.20/R13.10  · Personal history of colonic polyps     V12.72/Z86.010  · Screening for colon cancer     V76.51/Z12.11  · Nausea & vomiting     787.01/R11.2  · Pre-op testing     V72.84/Z01.818    Problems Reconciled  Plan  · Orders  o Consent for Colonoscopy with Possible Biopsy - Possible risks/complications, benefits, and alternatives to surgical or invasive procedure have been explained to patient and/or legal guardian. -Patient has been evaluated and can tolerate anesthesia and/or sedation. Risks, benefits, and alternatives to anesthesia and sedation have been explained to patient and/or legal guardian. (93218) - V76.51/Z12.11, V12.72/Z86.010 - 02/22/2021  o Consent for Esophagogastroduodenoscopy (EGD) with dilation - Possible risks/complications, benefits, and alternatives to surgical or invasive procedure have been explained to patient and/or legal guardian. - Patient has been evaluated and can tolerate anesthesia and/or sedation. Risks, benefits, and alternatives to anesthesia and sedation have been explained to patient and/or legal guardian. (42837) - 787.20/R13.10, 787.01/R11.2 - 02/22/2021  o [facility] Pre-Op Covid-19 Screening (47354) - V72.84/Z01.818 - 02/17/2021   [facility] @  3:30  · Medications  o Medications have been Reconciled  o Transition of Care or Provider Policy  · Instructions  o Surgical Facility: [facility]  o Handouts Provided Pre-Procedure Instructions including date, time, and location of procedure.   o PLAN: Proceeed with colonoscopy. Patient understands risks/benefits and is willing to proceed.   o PLAN: Proceeed with EGD. Patient understands risks/benefits and is willing to proceed.   o ***Surgical Orders***  o RISK AND BENEFITS:  o Given these options, the patient has verbally expressed an understanding of the risks of the surgery and finds these risks acceptable. Will proceed with surgery as soon as possible.  o O.R. PREP: Per protocol   o IV: Per Anesthesia  o Please sign permit for: Colonoscopy with possible biopsies by Dr. Snowden.  o Please sign permit for: Esophagogastroduodenoscopy with possible biopsies and dilation by Dr. Snowden.  o The above History and Physical Examination has been completed within 30 days of admission.  o ***Patient Status***  o Outpatient  o Follow up in the in the office post procedure.  o Advised patient that she would need COVID-19 testing prior to procedure peer encourage patient self isolate in between testing and procedure. Patient verbalizes understands willing to proceed  o Electronically Identified Patient Education Materials Provided Electronically            Electronically Signed by: AMANDA Perez -Author on November 20, 2020 01:03:57 PM

## 2021-05-10 NOTE — H&P
"   History and Physical      Patient Name: Shanthi Akers   Patient ID: [id number]   Sex: Female   YOB: 1960    Referring Provider: Joaquim Spann MD    Visit Date: October 21, 2020    Provider: AMANDA Catherine   Location: [facility]   Location Address: [address] [place] [address], [address]  [place], [place]  [id number]   Location Phone: [phone]          Chief Complaint  · Est. care      History Of Present Illness  Shanthi Akers is a 60 year old /White female who presents for evaluation and treatment of:      Previous PCP: Romeo Mukherjee, [facility]   Last labs: Unsure  LMP: Ablation 20 years ago  PAP: 2019, normal, OB/GYN  Mammogram: 2014, denies family history of breast cancer  Hepatitis C screening: Never  Colonoscopy: 2019, repeat in 6 months, due  Influenza vaccination: Would like   Hepatitis A vaccination: Declines  Shingles vaccination: Declines  Pneumonia vaccination: Due at 67  Eye exam: 2017  Dental exam: 2019  Smoking history: Denies  Specialists: Dr. Lidia Deal, [place], Psychiatry; EPW, OB/GYN    Depression/Anxiety-  Managed with Inderal, Klonopin, Paxil, Zyprexa, and meclizine. Patient reports chronic vertigo, states her psychiatrist feels that this triggers her anxiety attacks. Patient reports she is well controlled with this regimen and has been on it for a while. Was started on Inderal due to tremor caused by Zyprexa. Denies SI/HI. Patient reports she feels very fatigued, states \"I feel like I am low on something\".     Syncope-  Patient reports recent syncopal episodes x 2 months. Occur 1-2 times per week, always after going from sitting to standing. Patient states she will take off at a walk, have a short episode of confusion and wake up on the floor. States this only seems to last 30 seconds to 1 minute. Denies changes in vision/speech/hearing/gait or worst headache of her. Denies shortness of breath, chest pain, loss of bowel/bladder " function, nausea, vomiting, diaphoresis. States she feels fine after she wakes back up. Does not seem to occur during times of increased anxiety or with swallowing, coughing, defecation. Is not exacerbated by increase in physical activity. Denies history of cardiovascular disease, heart attack or stroke.     Asthma-  Requesting refill on Advair.          Past Medical History  Disease Name Date Onset Notes   Allergic rhinitis, chronic --  --    Asthma --  --    Colon cancer screening --  --    GERD --  --    High blood pressure --  --    High cholesterol --  --          Past Surgical History  Procedure Name Date Notes   Colon --  --    EGD --  --          Medication List  Name Date Started Instructions   Inderal  --    Klonopin oral  --    Paxil oral  --    Vistaril oral  --    Zyprexa oral  --          Allergy List  Allergen Name Date Reaction Notes   tetracycline --  rash --          Family Medical History  Disease Name Relative/Age Notes   -Father's Family History Unknown Father/   Father         Social History  Finding Status Start/Stop Quantity Notes   Alcohol Current every day --/-- --  drinks daily; wine and beer   Caffeine Current every day --/-- --  drinks regularly; 1-2 times per day   Second hand smoke exposure Never --/-- --  no   Tobacco Never --/-- --  never a smoker         Review of Systems  · Constitutional  o Denies  o : fever, fatigue, weight loss, weight gain  · Eyes  o Denies  o : discharge from eye, impaired vision, blurred vision  · HENT  o Admits  o : vertigo  o Denies  o : headaches  · Cardiovascular  o Denies  o : lower extremity edema, chest pressure, palpitations  · Respiratory  o Denies  o : shortness of breath, wheezing, cough, dyspnea on exertion  · Gastrointestinal  o Denies  o : nausea, vomiting, diarrhea, constipation, abdominal pain  · Neurologic  o Admits  o : syncope  o Denies  o : altered mental status, tingling or numbness, seizures  · Psychiatric  o Admits  o : anxiety,  "depression  o Denies  o : suicidal ideation, homicidal ideation      Vitals  Date Time BP Position Site L\R Cuff Size HR RR TEMP (F) WT  HT  BMI kg/m2 BSA m2 O2 Sat FR L/min FiO2 HC       10/21/2020 10:52 /68 Sitting    93 - R 18 97 191lbs 6oz 5'  8\" 29.1 2.04 96 %  21%        10/21/2020 11:55 /56 Standing       10/21/2020 11:52 AM 98/62 Sitting       10/21/2020 11:50 AM 98/66 Supine                 Physical Examination  · Constitutional  o Appearance  o : no acute distress, well-nourished  · Head and Face  o Head  o :   § Inspection  § : atraumatic, normocephalic  · Eyes  o Eyes  o : extraocular movements intact, no scleral icterus, no conjunctival injection  · Ears, Nose, Mouth and Throat  o Ears  o :   § External Ears  § : normal  § Otoscopic Examination  § : tympanic membrane appearance within normal limits bilaterally  o Nose  o :   § Intranasal Exam  § : nares patent  o Oral Cavity  o :   § Oral Mucosa  § : moist mucous membranes  o Throat  o :   § Oropharynx  § : no inflammation or lesions present, tonsils within normal limits  · Neck  o Thyroid  o : gland size normal, nontender, no nodules or masses present on palpation, symmetric  · Respiratory  o Respiratory Effort  o : breathing comfortably, symmetric chest rise  o Auscultation of Lungs  o : clear to asculatation bilaterally, no wheezes, rales, or rhonchii  · Cardiovascular  o Heart  o :   § Auscultation of Heart  § : regular rate and rhythm, no murmurs, rubs, or gallops  o Peripheral Vascular System  o :   § Extremities  § : no edema  · Lymphatic  o Neck  o : no lymphadenopathy present  o Supraclavicular Nodes  o : no supraclavicular nodes  · Skin and Subcutaneous Tissue  o General Inspection  o : no lesions present, no areas of discoloration, skin turgor normal  · Neurologic  o Mental Status Examination  o :   § Orientation  § : grossly oriented to person, place and time  o Gait and Station  o :   § Gait Screening  § : normal " gait  · Psychiatric  o General  o : normal mood and affect              Assessment  · Annual physical exam     V70.0/Z00.00  Basic labs in clinic today. Encouraged routine dental and eye exams.  · Asthma     493.90/J45.909  Advair to pharmacy per patient request.  · Depression     311/F32.9  Depression/anxiety well controlled per patient, continue to follow up with psychiatry as scheduled for management of current medication regimen. She will seek medical attention immediately if she feels that her mental health is deteriorating. Denies SI/HI. Will continue to monitor.   · Fatigue     780.79/R53.83  · Screening for depression     V79.0/Z13.89  PHQ9 score of 11.  · Need for influenza vaccination     V04.81/Z23  Influenza vaccination in clinic today.   · Screening for lipid disorders     V77.91/Z13.220  Lipid panel in clinic today.  · Screening for colon cancer     V76.51/Z12.11  Colonoscopy order placed.  · Screening for cervical cancer     V76.2/Z12.4  Patient to schedule PAP smear.  · Visit for screening mammogram     V76.12/Z12.31  Order placed for mammogram.   · Syncopal episodes     780.2/R55  Potentially secondary to combination of Zyprexa and Inderal due to risk of orthostatic hypotension. Orthostatic vitals in clinic without concern. Neurological exam normal. EKG in clinic with prolonged QT interval. Will schedule for Holter and refer to cardiology for further evaluation, would consider tilt table testing. Basic labs, including CBC, CMP, thyroid. Increase water intake. Patient to monitor closely and proceed directly to the ED with worsening syncopal episodes, changes in vision/speech/hearing/gait, worst headache of her life. Could consider further neurological workup with MRI brain in the future if warranted. Discussed safety measures. Will follow up in one month, sooner if concerns arise.  · Screening for condition     V82.9/Z13.9  Hepatitis C screening in clinic today.  · Abnormal  EKG     794.31/R94.31    Problems Reconciled  Plan  · Orders  o CBC with Auto Diff [facility] (79727) - 780.79/R53.83 - 10/21/2020  o CMP [facility] (35820) - 780.79/R53.83 - 10/21/2020  o Iron panel (iron, TIBC, transferrin saturation) (72361, 59472, 59474) - 780.79/R53.83 - 10/21/2020  o EBV antibody panel (23904, 45840, 27956) - 780.79/R53.83 - 10/21/2020  o CMV ab panel (IgG, IgM) (17856) - 780.79/R53.83 - 10/21/2020  o Thyroid Profile (25934, 89710, THYII) - 780.79/R53.83 - 10/21/2020  o B12 Folate levels (B12FO) - 780.79/R53.83 - 10/21/2020  o ACO-18: Positive screen for clinical depression using a standardized tool and a follow-up plan documented () - V79.0/Z13.89 - 10/21/2020   phq9 score of 11.  o Immunization Admin Fee (Single) ([facility]) (22777) - V04.81/Z23 - 10/21/2020  o ACO-14: Influenza immunization administered or previously received () - V04.81/Z23 - 10/21/2020  o Lipid Panel [facility] (95637) - V77.91/Z13.220 - 10/21/2020  o COLONOSCOPY REFERRAL (COLON) - V76.51/Z12.11 - 10/21/2020   Dr. Snowden  o Screening Mammography; Bilateral 3D (81092, 36686, ) - V76.12/Z12.31 - 10/21/2020  o ACO-39: Current medications updated and reviewed (1159F, ) - - 10/21/2020  o ACO-18: Positive screen for clinical depression using a standardized tool and a follow-up plan documented () - - 10/21/2020  o ACO-14: Influenza immunization administered or previously received [facility] () - - 10/21/2020  o Holter Monitor 24 Hour [facility] (75439) - 794.31/R94.31 - 10/21/2020  o Hepatitis C antibody (71557) - V82.9/Z13.9 - 10/21/2020  o CARDIOLOGY CONSULTATION (CARDI) - 794.31/R94.31 - 10/21/2020  o Afluria Quadrivalent Influenza Vaccine Multidose Vial [facility] (80627) - V04.81/Z23 - 10/21/2020   Vaccine - Influenza; Dose: 0.5; Site: Right Deltoid; Route: Intramuscular; Date: 10/21/2020 11:56:00; Exp: 06/30/2021; Lot: [id number]; Mfg: [employer]; TradeName: Fluarix, quadrivalent, preservative free; Administered By: El  Yarelis MORRISSEY; Comment: Pt tolerated well. Left the office in stable condition. PAKO MORRISSEY.  · Medications  o Advair Diskus 500-50 mcg/dose inhalation blister with device   SIG: inhale 1 puff by inhalation route 2 times per day in the morning and evening approximately 12 hours apart   DISP: (1) Puff with 2 refills  Prescribed on 10/21/2020     o Medications have been Reconciled  o Transition of Care or Provider Policy  · Instructions  o Reviewed health maintenance flowsheet and updated information. Orders were placed and/or patient's response was documented.  o Discussed the need for therapy, either with a certified counselor, psychologist, and/or family . If no improvement is noted or worsening of their condition, return to office or ER. But also discussed with patient that if they are non-responsive to the type of medication they may need to see a psychiatrist for further evaluation and management.  o Depression Screen completed and scanned into the EMR under the designated folder within the patient's documents.  o Today's PHQ-9 result is 11  o Take all medications as prescribed/directed.  o Patient was educated/instructed on their diagnosis, treatment and medications prior to discharge from the clinic today.  o Patient instructed to seek medical attention urgently for new or worsening symptoms.  o Call the office with any concerns or questions.  o Chronic conditions reviewed and taken into consideration for today's treatment plan.  · Disposition  o Call or Return if symptoms worsen or persist.  o Follow up in 1 month  o Prescriptions sent to pharmacy  o Labs drawn in clinic  o Will call patient with results of labs  o Please schedule Well Woman Exam  · Referrals  o Inbound Referral/Transition            Electronically Signed by: AMANDA Catherine -Author on October 21, 2020 01:17:15 PM

## 2021-05-10 NOTE — H&P
History and Physical      Patient Name: Shanthi Akers   Patient ID: [id number]   Sex: Female   YOB: 1960    Primary Care Provider: Charo PATEL   Referring Provider: Charo PATEL    Visit Date: November 18, 2020    Provider: Dyllan Brooks MD   [facility] [facility]   Location Address: [address] [place] [address], [address]   NA[place] Watson  [id number]   Location Phone: [phone]          History Of Present Illness  Consult requested by: Charo PATEL   I saw Shanthi Akers in the office today. This is a 60 year old, /White female. She has no previous cardiac history. She recently, over the past month, has had intermittent episodes of dizziness, orthostasis and syncope after standing. Symptoms were severe, associated with standing, improved after falling. The patient eventually was hospitalized right at the end of October, spent about 4 days in the hospital. She was found to be severely dehydrated with severe hypokalemia, potassium 2.1. This was corrected with IV fluid and electrolyte supplementation. Her labs resolved as did her symptoms, and she has not had any further episodes since being discharged a couple weeks ago. During the hospital stay, she had an echocardiogram, which was normal. Recent Holter Monitor showed sinus rhythm with ectopic beats but no arrhythmias.   PAST MEDICAL HISTORY: includes hypertension; anxiety/depression; gastroesophageal reflux. PAST SURGICAL HISTORY: Colonoscopy; foot surgery.   PSYCHOSOCIAL HISTORY: Positive for mood changes and depression. She drinks alcohol on a daily basis. She previously used tobacco but quit. She is .   FAMILY HISTORY: Positive for hypertension and heart disease. Negative for diabetes.   CURRENT MEDICATIONS: include Klonopin 1 mg t.i.d. p.r.n.; Zyprexa 1 mg daily; Paxil 40 mg daily; Vistaril 25 mg p.r.n.; Omeprazole 40 mg daily; Meclizine 15 mg daily; aspirin 81 mg daily; Thera-Tab; prenatal vitamin;  "Biotin; fish oil; Vitamin D3. The dosage and frequency of the medications were reviewed with the patient.   ALLERGIES: Tetracycline.       Review of Systems  · Constitutional  o Admits  o : fatigue, recent weight changes   o Denies  o : good general health lately  · Eyes  o Admits  o : blurred vision  o Denies  o : double vision  · HENT  o Admits  o : hearing loss or ringing, chronic sinus problem  o Denies  o : swollen glands in neck  · Cardiovascular  o Admits  o : swelling (feet, ankles, hands), shortness of breath while walking or lying flat  o Denies  o : chest pain, palpitations (fast, fluttering, or skipping beats)  · Respiratory  o Admits  o : chronic or frequent cough, asthma or wheezing  o Denies  o : COPD  · Gastrointestinal  o Admits  o : nausea or vomiting  o Denies  o : ulcers  · Neurologic  o Admits  o : headaches  o Denies  o : lightheaded or dizzy, stroke  · Musculoskeletal  o Denies  o : joint pain, back pain  · Endocrine  o Denies  o : thyroid disease, diabetes, heat or cold intolerance, excessive thirst or urination  · Heme-Lymph  o Denies  o : bleeding or bruising tendency, anemia      Vitals  Date Time BP Position Site L\R Cuff Size HR RR TEMP (F) WT  HT  BMI kg/m2 BSA m2 O2 Sat FR L/min FiO2 HC       11/18/2020 11:19 /88 Sitting    114 - R   183lbs 0oz 5'  7\" 28.66 1.98       11/18/2020 11:19 /84 Sitting    112 - R   183lbs 0oz 5'  7\" 28.66 1.98             Physical Examination  · Constitutional  o Appearance  o : Overweight, white female, appears anxious at baseline, in no significant distress.  · Head and Face  o HEENT  o : No pallor, anicteric. Eyes normal. Moist mucous membranes.  · Neck  o Inspection/Palpation  o : Supple. No hepatosplenomegaly.  o Jugular Veins  o : No JVD. No carotid bruits.  · Respiratory  o Auscultation of Lungs  o : Clear to auscultation bilaterally. No crackles or wheezing.  · Cardiovascular  o Heart  o : S1, S2 is normally heard. No S3. No murmur, rubs, " or gallops. She is mildly tachycardic.  · Gastrointestinal  o Abdominal Examination  o : Soft, non-distended. No palpable hepatosplenomegaly. Bowel sounds heard in all four quadrants.  · Musculoskeletal  o General  o : Normal muscle tone and strength.  · Skin and Subcutaneous Tissue  o General Inspection  o : No skin rashes.  · Extremities  o Extremities  o : Warm and well perfused. Distal pulses present. No pitting pedal edema.     I reviewed her hospital records.  Her EKG showed sinus rhythm with non-specific ST changes.    Laboratory studies were reviewed.  Initial electrolytes showed a potassium of 2.1, which was corrected to 3.7.      Holter Monitor showed benign ectopy, sinus rhythm.    Echocardiogram showed normal biventricular function with no valvular abnormalities.               Assessment     Episodic syncope - By history, this was secondary to orthostasis due to dehydration and severe electrolyte      disturbances.  These were corrected at a recent hospitalization, and her symptoms currently have resolved.    Baseline EKG showed non-specific ST changes.  Her echocardiogram showed no structural abnormalities.  She was taken off of Propranolol given her orthostatic problems recently.  There is no evidence of underlying primary arrhythmia or cardiac disease at this point.             Plan     I agree with her current medical therapy.  The patient is stable from a cardiac standpoint.  No additional diagnostics are needed at this time.  I will follow her as needed in the future if problems arise.  She is agreeable with this plan.    SHAKIRA burns/mary           This note was transcribed by Migdalia Negrete.  dmd/cbd  The above service was transcribed by Migdalia Negrete, and I attest to the accuracy of the note.  CBD.             Electronically Signed by: Migdalia Negrete-, -Author on November 23, 2020 10:52:21 AM  Electronically Co-signed by: Dyllan Brooks MD -Reviewer on November 30, 2020  06:46:57 AM

## 2021-05-13 NOTE — PROGRESS NOTES
Progress Note      Patient Name: Shanthi Akers   Patient ID: [id number]   Sex: Female   YOB: 1960    Primary Care Provider: Charo PATEL   Referring Provider: Charo PATEL    Visit Date: December 11, 2020    Provider: AMANDA Catherine   Location: [facility]   Location Address: [address] [place] [address], [address]  [place], [place]  [id number]   Location Phone: [phone]          Chief Complaint  · Follow-up  · Hypokalemia  · Asthma       History Of Present Illness  Shanthi Akers is a 60 year old /White female who presents for evaluation and treatment of:      Asthma/allergic rhinitis-  Patient reports asthma symptoms have improved significantly with Advair. She has been having breakthrough allergy symptoms. Was previously on Singulair and this worked well for her.     Constipation-  Currently managed with Miralax BID. Scheduled for colonoscopy 2/2021. She would like a prescription for Colace, this has worked for her in the past.    Patient reports history of HTN and HLD, previously on medication through her previous PCP before she lost her insurance. She is not sure what she was on, this has been over 6 years ago. Denies chest pain, blurry vision, headache, leg swelling. Episodes of dizziness have resolved. She does not check her blood pressure at home.       Past Medical History  Disease Name Date Onset Notes   Allergic rhinitis, chronic --  --    Asthma --  --    Broken Bones --  --    Colon cancer screening --  --    Depression with anxiety --  --    Essential hypertension --  --    GERD --  --    Hyperlipidemia --  --    Migraine headache --  --    Psychiatric disorder --  --    Shortness of Breath --  --    Sinus trouble --  --          Past Surgical History  Procedure Name Date Notes   Colon --  --    EGD --  --          Medication List  Name Date Started Instructions   Advair Diskus 500-50 mcg/dose inhalation blister with device 10/21/2020 inhale 1  puff by inhalation route 2 times per day in the morning and evening approximately 12 hours apart   aspirin 81 mg oral tablet,chewable  chew 1 tablet (81 mg) by oral route once daily   biotin 5 mg oral capsule  take 1 capsule by oral route daily   Klonopin 1 mg oral tablet  take 1 tablet by oral route As needed   meclizine 25 mg oral tablet  take 1 tablet (25 mg) by oral route 3 times per day as needed   Miralax 17 gram oral powder in packet  take 1 packet (17 gram) mixed with 8 oz. water, juice, soda, coffee or tea by oral route BID as needed   Omega-3 Fish Oil 300-1,000 mg oral capsule  take 1 capsule by oral route daily   omeprazole 40 mg oral capsule,delayed release(DR/EC) 11/10/2020 take 1 capsule (40 mg) by oral route once daily before a meal   Paxil 40 mg oral tablet  take 1 tablet (40 mg) by oral route once daily   Prilosec OTC 20 mg oral tablet,delayed release (DR/EC)  take 1 tablet by oral route 2 times a day   Ventolin HFA 90 mcg/actuation inhalation HFA aerosol inhaler  inhale 2 puffs (180 mcg) by inhalation route every 6 hours   Vitamins and Minerals oral tablet  take 1 tablet by oral route daily for 60 days   Zyprexa oral  --          Allergy List  Allergen Name Date Reaction Notes   tetracycline --  rash --        Allergies Reconciled  Family Medical History  Disease Name Relative/Age Notes   Melanoma Brother/   --    -Father's Family History Unknown Father/   Father   Family history of stroke Mother/   --    Family history of heart attack Mother/   --          Reproductive History  Menstrual   Pregnancy Summary   Total Pregnancies: 0 Full Term: 0 Premature: 0   Ab Induced: 0 Ab Spontaneous: 0 Ectopics: 0   Multiples: 0 Livin         Social History  Finding Status Start/Stop Quantity Notes   Alcohol Current some day --/-- --  drinks daily; wine and beer   Caffeine Current every day --/-- --  drinks regularly; 1-2 times per day   Second hand smoke exposure Never --/-- --  no   Tobacco Never --/-- --  " never a smoker         Immunizations  NameDate Admin Mfg Trade Name Lot Number Route Inj VIS Given VIS Publication   [id number]/21/2020 SKB Fluarix, quadrivalent, preservative free [id number] IM RD 10/21/2020    Comments: Pt tolerated well. Left the office in stable condition. PAKO MORRISSEY.         Review of Systems  · Constitutional  o Denies  o : fever, fatigue, weight loss, weight gain  · Eyes  o Denies  o : discharge from eye, impaired vision, blurred vision  · HENT  o Denies  o : headaches, vertigo, lightheadedness  · Cardiovascular  o Denies  o : lower extremity edema, chest pressure, palpitations  · Respiratory  o Denies  o : shortness of breath, wheezing, cough, dyspnea on exertion  · Gastrointestinal  o Denies  o : nausea, vomiting, diarrhea, constipation, abdominal pain  · Allergic-Immunologic  o Admits  o : seasonal allergies      Vitals  Date Time BP Position Site L\R Cuff Size HR RR TEMP (F) WT  HT  BMI kg/m2 BSA m2 O2 Sat FR L/min FiO2 HC       11/18/2020 11:19 /88 Sitting    114 - R   183lbs 0oz 5'  7\" 28.66 1.98       11/20/2020 11:41 AM       16  181lbs 0oz 5'  7.5\" 27.93 1.98       12/11/2020 01:01 /90 Sitting    105 - R  97.5 168lbs 7oz 5'  7\" 26.38 1.9 96 %  21%          Physical Examination  · Constitutional  o Appearance  o : no acute distress, well-nourished  · Head and Face  o Head  o :   § Inspection  § : atraumatic, normocephalic  · Ears, Nose, Mouth and Throat  o Ears  o :   § External Ears  § : normal  o Nose  o :   § Intranasal Exam  § : nares patent  o Oral Cavity  o :   § Oral Mucosa  § : moist mucous membranes  · Respiratory  o Respiratory Effort  o : breathing comfortably, symmetric chest rise  o Auscultation of Lungs  o : clear to asculatation bilaterally, no wheezes, rales, or rhonchii  · Cardiovascular  o Heart  o :   § Auscultation of Heart  § : regular rate and rhythm, no murmurs, rubs, or gallops  o Peripheral Vascular System  o :   § Extremities  § : no edema  · Skin " and Subcutaneous Tissue  o General Inspection  o : no lesions present, no areas of discoloration, skin turgor normal  · Neurologic  o Mental Status Examination  o :   § Orientation  § : grossly oriented to person, place and time  o Gait and Station  o :   § Gait Screening  § : normal gait              Assessment  · Asthma     493.90/J45.909  Well controlled, continue Advair.  · Essential hypertension     401.9/I10  Mild elevation in clinic today, history of medication management per patient report. Patient to monitor blood pressure daily x 1 month, will call with consistent elevations greater than 130s/80s. Will follow up in one month to review log, sooner if concerns arise. Could consider medication management at that time. Labs in clinic today.  · Hyperlipidemia     272.4/E78.5  History of medication management. Lipid panel in clinic today.  · Allergic rhinitis     477.9/J30.9  Will start daily Singulair. Follow up in one month to assess medication effectiveness, sooner if concerns arise. Could consider starting Zyrtec at that time if warranted.   · Constipation     564.00/K59.00  Colace to pharmacy per patient request. Follow up for colonoscopy as scheduled. Will continue to monitor.     Problems Reconciled  Plan  · Orders  o CBC with Auto Diff [facility] ([id number]) - 401.9/I10 - 12/11/2020  o CMP [facility] ([id number]) - 272.4/E78.5 - 12/11/2020  o Lipid Panel [facility] ([id number]) - 272.4/E78.5 - 12/11/2020  o ACO-39: Current medications updated and reviewed (1159F, ) - - 12/11/2020  · Medications  o Singulair 10 mg oral tablet   SIG: take 1 tablet (10 mg) by oral route once daily in the evening   DISP: (30) Tablet with 1 refills  Prescribed on 12/11/2020     o Colace 100 mg oral capsule   SIG: take 1 capsule (100 mg) by oral route once daily at bedtime as needed   DISP: (30) Capsule with 2 refills  Prescribed on 12/11/2020     o Medications have been Reconciled  o Transition of Care or Provider Policy  · Instructions  o Patient advised  to monitor blood pressure (B/P) at home and journal readings. Patient informed that a B/P reading at home of more than 130/80 is considered hypertension. For readings greater pvow255/90 or higher patient is advised to follow up in the office with readings for management. Patient advised to limit sodium intake.  o Advised that cheeses and other sources of dairy fats, animal fats, fast food, and the extras (candy, pastries, pies, doughnuts and cookies) all contain LDL raising nutrients. Advised to increase fruits, vegetables, whole grains, and to monitor portion sizes.   o Take all medications as prescribed/directed.  o Patient was educated/instructed on their diagnosis, treatment and medications prior to discharge from the clinic today.  o Patient instructed to seek medical attention urgently for new or worsening symptoms.  o Call the office with any concerns or questions.  o Chronic conditions reviewed and taken into consideration for today's treatment plan.  · Disposition  o Call or Return if symptoms worsen or persist.  o Follow up in 1 month  o Prescriptions sent to pharmacy  o Labs drawn in clinic  o Will call patient with results of labs            Electronically Signed by: AMANDA Catherine -Author on December 11, 2020 02:35:10 PM

## 2021-05-13 NOTE — PROGRESS NOTES
Progress Note      Patient Name: Shanthi Akers   Patient ID: [id number]   Sex: Female   YOB: 1960    Primary Care Provider: Charo PATEL   Referring Provider: Charo PATEL    Visit Date: November 10, 2020    Provider: AMANDA Catherine   Location: [facility]   Location Address: [address] [place] [address]  [id number]   Location Phone: [phone]          Chief Complaint  · Follow up office visit within 14 calendar days of discharge from inpatient status (moderate complexity).  · Syncope      History Of Present Illness  FOLLOW UP OFFICE VISIT WITHIN 14 CALENDAR DAYS OF INPATIENT STATUS (MODERATE COMPLEXITY)  Shanthi Akers presents to office for follow up post discharge from inpatient status within 14 calendar days. Patient was contacted within 2 business days via phone conversation. Documentation of that phone contact is present in the patient's electronic chart. Patient was admitted to inpatient facility on 11/01/2020 and discharged 11/02/2020 due to: Syncope and dehydration.   Admitting MD: Gilbert Carmona MD   Her discharge summary has been reviewed and placed in the patient's electronic chart.   Patient's problem list is: Allergic rhinitis, chronic, Asthma, Depression with anxiety, GERD, Migraine headache, Psychiatric disorder, Shortness of Breath, and Sinus trouble   Patient's outpatient medication list has been reconcilled with the medication list from the discharge summary and has been reviewed with the patient. Current medication list is: Advair Diskus 500-50 mcg/dose inhalation blister with device, aspirin 81 mg oral tablet,chewable, biotin 5 mg oral capsule, desonide 0.05 % topical cream, Klonopin 1 mg oral tablet, meclizine 25 mg oral tablet, Miralax 17 gram oral powder in packet, Omega-3 Fish Oil 300-1,000 mg oral capsule, Paxil 40 mg oral tablet, Prilosec OTC 20 mg oral tablet,delayed release (DR/EC), Ventolin HFA 90 mcg/actuation  inhalation HFA aerosol inhaler, Vitamins and Minerals oral tablet, and Zyprexa oral   Shanthi Akers is a 60 year old /White female who presents for evaluation and treatment of:      Patient evaluated at [facility] ED, admitted x 4 days for syncope with hypokalemia, hypomagnesemia, and hyponatremia. Was diagnosed with dehydration, electrolyte imbalances, alcohol use disorder and polypharmacy due to psych medications. Patient states since discharge she feels much better, is no longer having syncopal or dizzy episodes. She has been feeling more short of breath and fatigued. She is due to drop off Holter monitor today, scheduled with cardiology Dr. Brooks next week. Denies sudden onset of shortness of breath, chest pain, calf pain/erythema/edema/warmth, palpitations, coughing, diaphoresis. Does report an increase in GERD, would like something other than Nexium to try. Patient reports she drinks daily, typically 3-4 drinks. She has cut down to 2-3 since discharge. Is not interested in complete cessation, typically drinks a wine cooler and a couple glasses of wine.       Past Medical History  Disease Name Date Onset Notes   Allergic rhinitis, chronic --  --    Asthma --  --    Broken Bones --  --    Colon cancer screening --  --    Depression with anxiety --  --    Essential hypertension --  --    GERD --  --    Hyperlipidemia --  --    Migraine headache --  --    Psychiatric disorder --  --    Shortness of Breath --  --    Sinus trouble --  --          Past Surgical History  Procedure Name Date Notes   Colon --  --    EGD --  --          Medication List  Name Date Started Instructions   Advair Diskus 500-50 mcg/dose inhalation blister with device 10/21/2020 inhale 1 puff by inhalation route 2 times per day in the morning and evening approximately 12 hours apart   aspirin 81 mg oral tablet,chewable  chew 1 tablet (81 mg) by oral route once daily   biotin 5 mg oral capsule  take 1 capsule by oral route daily    Klonopin 1 mg oral tablet  take 1 tablet by oral route As needed   meclizine 25 mg oral tablet  take 1 tablet (25 mg) by oral route 3 times per day as needed   Miralax 17 gram oral powder in packet  take 1 packet (17 gram) mixed with 8 oz. water, juice, soda, coffee or tea by oral route BID as needed   Omega-3 Fish Oil 300-1,000 mg oral capsule  take 1 capsule by oral route daily   Paxil 40 mg oral tablet  take 1 tablet (40 mg) by oral route once daily   Prilosec OTC 20 mg oral tablet,delayed release (DR/EC)  take 1 tablet by oral route 2 times a day   Ventolin HFA 90 mcg/actuation inhalation HFA aerosol inhaler  inhale 2 puffs (180 mcg) by inhalation route every 6 hours   Vitamins and Minerals oral tablet  take 1 tablet by oral route daily for 60 days   Zyprexa oral  --          Allergy List  Allergen Name Date Reaction Notes   tetracycline --  rash --          Family Medical History  Disease Name Relative/Age Notes   Melanoma Brother/   --    -Father's Family History Unknown Father/   Father   Family history of stroke Mother/   --    Family history of heart attack Mother/   --          Reproductive History  Menstrual   Pregnancy Summary   Total Pregnancies: 0 Full Term: 0 Premature: 0   Ab Induced: 0 Ab Spontaneous: 0 Ectopics: 0   Multiples: 0 Livin         Social History  Finding Status Start/Stop Quantity Notes   Alcohol Current some day --/-- --  drinks daily; wine and beer   Caffeine Current every day --/-- --  drinks regularly; 1-2 times per day   Second hand smoke exposure Never --/-- --  no   Tobacco Never --/-- --  never a smoker         Immunizations  NameDate Admin Mfg Trade Name Lot Number Route Inj VIS Given VIS Publication   [id number]/ SKB Fluarix, quadrivalent, preservative free [id number] IM RD 10/21/2020    Comments: Pt tolerated well. Left the office in stable condition. NE MA.         Review of Systems  · Constitutional  o Admits  o : fatigue  o Denies  o : fever, weight gain,  "weight loss  · Eyes  o Denies  o : discharge from eye, impaired vision, blurred vision  · HENT  o Denies  o : headaches, vertigo, lightheadedness  · Cardiovascular  o Denies  o : palpitation, chest pain, lower extremity edema  · Respiratory  o Admits  o : shortness of breath  o Denies  o : wheezing, dry cough, productive cough  · Gastrointestinal  o Admits  o : heartburn, reflux  o Denies  o : nausea, vomiting, diarrhea, constipation  · Genitourinary  o Denies  o : urgency, frequency, dysuria  · Integument  o Denies  o : rash, pigmentation changes, new skin lesions  · Neurologic  o Denies  o : unsteady gait, weakness, dizziness, H/A  · Heme-Lymph  o Denies  o : petechiae, lymph node enlargement or tenderness      Vitals  Date Time BP Position Site L\R Cuff Size HR RR TEMP (F) WT  HT  BMI kg/m2 BSA m2 O2 Sat FR L/min FiO2 HC       03/18/2019 01:26 PM       16  217lbs 0oz 5'  7.5\" 33.49 2.17       10/21/2020 10:52 /68 Sitting    93 - R 18 97 191lbs 6oz 5'  8\" 29.1 2.04 96 %  21%    11/10/2020 10:10 /96 Sitting    103 - R  97.8 187lbs 8oz 5'  8\" 28.51 2.02 98 %  21%          Physical Examination  · Constitutional  o Appearance  o : no acute distress, well-nourished  · Head and Face  o Head  o :   § Inspection  § : atraumatic, normocephalic  · Ears, Nose, Mouth and Throat  o Ears  o :   § External Ears  § : normal  o Nose  o :   § Intranasal Exam  § : nares patent  o Oral Cavity  o :   § Oral Mucosa  § : moist mucous membranes  · Neck  o Thyroid  o : gland size normal, nontender, no nodules or masses present on palpation, symmetric  · Respiratory  o Respiratory Effort  o : breathing comfortably, symmetric chest rise  o Auscultation of Lungs  o : clear to asculatation bilaterally, no wheezes, rales, or rhonchii  · Cardiovascular  o Heart  o :   § Auscultation of Heart  § : regular rate and rhythm, no murmurs, rubs, or gallops  o Peripheral Vascular System  o :   § Extremities  § : no " edema  · Lymphatic  o Neck  o : no lymphadenopathy present  o Supraclavicular Nodes  o : no supraclavicular nodes  · Skin and Subcutaneous Tissue  o General Inspection  o : no lesions present, no areas of discoloration, skin turgor normal  · Neurologic  o Mental Status Examination  o :   § Orientation  § : grossly oriented to person, place and time  o Gait and Station  o :   § Gait Screening  § : normal gait              Assessment  · Asthma     493.90/J45.909  · GERD (gastroesophageal reflux disease)     530.81/K21.9  Omeprazole to pharmacy. Will follow up in one month to assess medication effectiveness, sooner if concerns arise.  · Shortness of Breath     786.05/R06.02  No red flags for DVT. Likely secondary to deconditioning with recent hospital stay. Will repeat labs to rule out underlying electrolyte imbalance and send for CXR for further evaluation. She is to seek medical attention immediately with sudden onset of shortness of breath, chest pain, calf pain/erythema/edema/warmth, palpitations, coughing, diaphoresis. Continue inhalers as previously prescribed. Will follow up in one month, sooner if concerns arise.  · Hypokalemia     276.8/E87.6  Reversed while inpatient, repeat CMP.  · Hypomagnesemia     275.2/E83.42  Reversed while inpatient, repeat magnesium level.   · Hyponatremia     276.1/E87.1  Reversed while inpatient, repeat CMP.  · Elevated liver enzymes     790.5/R74.8  Noted on recent labs, repeat CMP.  · Leukocytosis     288.60/D72.829  Noted on recent labs, due for repeat CBC.   · Alcohol use     V49.89/Z72.89  Discussed risks of chronic alcohol use and likely cause of recently elevated LFTs. She is aware of risks, is not interested in cessation at this time. Will continue to monitor.     Problems Reconciled  Plan  · Orders  o Discharge medications reconciled with the current medication list (1111F) - - 11/10/2020  o CBC with Auto Diff [facility] (71623) - 276.8/E87.6, 276.1/E87.1, 790.5/R74.8,  288.60/D72.829 - 11/10/2020  o CMP [facility] ([id number]) - 276.8/E87.6, 275.2/E83.42, 276.1/E87.1, 790.5/R74.8 - 11/10/2020  o ACO-39: Current medications updated and reviewed (1159F, ) - - 11/10/2020  o Magnesium, serum (96781) - 275.2/E83.42 - 11/10/2020  o Chest (AP PA and Lateral) 2 views X-Ray [facility] ([id number]) - 786.05/R06.02 - 11/10/2020   to be done at [facility]  · Medications  o omeprazole 40 mg oral capsule,delayed release(DR/EC)   SIG: take 1 capsule (40 mg) by oral route once daily before a meal   DISP: (30) Capsule with 1 refills  Prescribed on 11/10/2020     o Medications have been Reconciled  o Transition of Care or Provider Policy  · Instructions  o Patient discharge summary has been reviewed and placed in patient's electronic medical record.  o Patient received a phone call from my office within 2 business days of discharge from inpatient status, and documented within the patient's chart.  o Also patient was seen (face to face) for follow up evaluation within 14 calendar days of discharge from inpatient status for a severe complexity issue.  o Patient was educated on their diagnosis, treatment and any medication changes while being evaluated today.  o Take all medications as prescribed/directed.  o Patient was educated/instructed on their diagnosis, treatment and medications prior to discharge from the clinic today.  o Patient instructed to seek medical attention urgently for new or worsening symptoms.  o Call the office with any concerns or questions.  o Chronic conditions reviewed and taken into consideration for today's treatment plan.  · Disposition  o Call or Return if symptoms worsen or persist.  o Follow up in 1 month  o Prescriptions sent to pharmacy  o Will call patient with results of labs  o Will call patient with results of imaging  o Radiograph to be performed outside of clinic            Electronically Signed by: AMANDA Catherine -Author on November 10, 2020 12:49:34 PM

## 2021-05-14 VITALS
HEIGHT: 67 IN | WEIGHT: 179.25 LBS | DIASTOLIC BLOOD PRESSURE: 92 MMHG | SYSTOLIC BLOOD PRESSURE: 108 MMHG | RESPIRATION RATE: 16 BRPM | HEART RATE: 122 BPM | TEMPERATURE: 96.6 F | OXYGEN SATURATION: 99 % | BODY MASS INDEX: 28.13 KG/M2

## 2021-05-14 VITALS
WEIGHT: 191.37 LBS | HEART RATE: 93 BPM | DIASTOLIC BLOOD PRESSURE: 68 MMHG | RESPIRATION RATE: 18 BRPM | TEMPERATURE: 97 F | HEIGHT: 68 IN | OXYGEN SATURATION: 96 % | SYSTOLIC BLOOD PRESSURE: 114 MMHG | BODY MASS INDEX: 29 KG/M2

## 2021-05-14 VITALS
BODY MASS INDEX: 28.42 KG/M2 | DIASTOLIC BLOOD PRESSURE: 96 MMHG | HEART RATE: 103 BPM | SYSTOLIC BLOOD PRESSURE: 134 MMHG | HEIGHT: 68 IN | TEMPERATURE: 97.8 F | WEIGHT: 187.5 LBS | OXYGEN SATURATION: 98 %

## 2021-05-14 VITALS
HEIGHT: 67 IN | HEART RATE: 114 BPM | WEIGHT: 183 LBS | SYSTOLIC BLOOD PRESSURE: 150 MMHG | DIASTOLIC BLOOD PRESSURE: 88 MMHG | BODY MASS INDEX: 28.72 KG/M2

## 2021-05-14 VITALS
HEART RATE: 79 BPM | WEIGHT: 164.37 LBS | BODY MASS INDEX: 25.8 KG/M2 | TEMPERATURE: 97.8 F | SYSTOLIC BLOOD PRESSURE: 122 MMHG | OXYGEN SATURATION: 100 % | DIASTOLIC BLOOD PRESSURE: 84 MMHG | HEIGHT: 67 IN

## 2021-05-14 VITALS
BODY MASS INDEX: 26.21 KG/M2 | HEIGHT: 67 IN | WEIGHT: 167 LBS | DIASTOLIC BLOOD PRESSURE: 66 MMHG | SYSTOLIC BLOOD PRESSURE: 114 MMHG | TEMPERATURE: 98.2 F | HEART RATE: 96 BPM | OXYGEN SATURATION: 98 %

## 2021-05-14 VITALS
TEMPERATURE: 97.8 F | HEIGHT: 67 IN | OXYGEN SATURATION: 97 % | BODY MASS INDEX: 25.64 KG/M2 | DIASTOLIC BLOOD PRESSURE: 80 MMHG | HEART RATE: 99 BPM | WEIGHT: 163.37 LBS | SYSTOLIC BLOOD PRESSURE: 130 MMHG

## 2021-05-14 VITALS
BODY MASS INDEX: 26.27 KG/M2 | TEMPERATURE: 97 F | HEIGHT: 67 IN | OXYGEN SATURATION: 99 % | WEIGHT: 167.37 LBS | SYSTOLIC BLOOD PRESSURE: 118 MMHG | DIASTOLIC BLOOD PRESSURE: 82 MMHG | HEART RATE: 102 BPM

## 2021-05-14 VITALS — HEIGHT: 67 IN | WEIGHT: 181 LBS | RESPIRATION RATE: 16 BRPM | BODY MASS INDEX: 28.41 KG/M2

## 2021-05-14 VITALS
SYSTOLIC BLOOD PRESSURE: 122 MMHG | WEIGHT: 168.44 LBS | HEART RATE: 105 BPM | HEIGHT: 67 IN | OXYGEN SATURATION: 96 % | BODY MASS INDEX: 26.44 KG/M2 | DIASTOLIC BLOOD PRESSURE: 90 MMHG | TEMPERATURE: 97.5 F

## 2021-05-14 VITALS — DIASTOLIC BLOOD PRESSURE: 56 MMHG | SYSTOLIC BLOOD PRESSURE: 100 MMHG

## 2021-05-14 NOTE — PROGRESS NOTES
Progress Note      Patient Name: Shanthi Akers   Patient ID: [id number]   Sex: Female   YOB: 1960    Primary Care Provider: Charo PATEL   Referring Provider: Charo PATEL    Visit Date: December 15, 2020    Provider: AMANDA Catherine   Location: [facility]   Location Address: [address] [place] [address], [facility]  [place], [place]  [id number]   Location Phone: [phone]          Chief Complaint  · Annual Exam  · PAP exam  · (Health Maintainence Information Reviewed Under Results)      History Of Present Illness  Last PAP Smear: 2019.   Date of Last Mammogram: 2018.   Date of Last Colonoscopy: 2019   No current complaints.      Last PAP: 2019  Last Mammogram: 2018  LMP: History of uterine ablation     Patient reports last PAP 2019, normal. Denies history of abnormal PAPs. Last mammogram 2018, normal. History of breast cysts. Denies vaginal lesion, discharge, dysuria or pain with sexual activity. Denies breast lumps, nipple discharge or changes in breast. Currently with breast tenderness, often occurs monthly around when her cycle should be. Denies family history of breast cancer. Patient is without gynecological concern at this time. Patient defers STD testing.       Past Medical History  Disease Name Date Onset Notes   Allergic rhinitis, chronic --  --    Asthma --  --    Broken Bones --  --    Colon cancer screening --  --    Depression with anxiety --  --    Essential hypertension --  --    GERD --  --    Hyperlipidemia --  --    Migraine headache --  --    Psychiatric disorder --  --    Shortness of Breath --  --    Sinus trouble --  --          Past Surgical History  Procedure Name Date Notes   Colon --  --    EGD --  --          Medication List  Name Date Started Instructions   Advair Diskus 500-50 mcg/dose inhalation blister with device 12/15/2020 inhale 1 puff by inhalation route 2 times per day in the morning and evening approximately 12 hours apart   aspirin 81  mg oral tablet,chewable  chew 1 tablet (81 mg) by oral route once daily   biotin 5 mg oral capsule  take 1 capsule by oral route daily   Colace 100 mg oral capsule 2020 take 1 capsule (100 mg) by oral route once daily at bedtime as needed   Klonopin 1 mg oral tablet  take 1 tablet by oral route As needed   meclizine 25 mg oral tablet  take 1 tablet (25 mg) by oral route 3 times per day as needed   Miralax 17 gram oral powder in packet  take 1 packet (17 gram) mixed with 8 oz. water, juice, soda, coffee or tea by oral route BID as needed   Omega-3 Fish Oil 300-1,000 mg oral capsule  take 1 capsule by oral route daily   omeprazole 40 mg oral capsule,delayed release(DR/EC) 11/10/2020 take 1 capsule (40 mg) by oral route once daily before a meal   Paxil 40 mg oral tablet  take 1 tablet (40 mg) by oral route once daily   Singulair 10 mg oral tablet 2020 take 1 tablet (10 mg) by oral route once daily in the evening   Ventolin HFA 90 mcg/actuation inhalation HFA aerosol inhaler  inhale 2 puffs (180 mcg) by inhalation route every 6 hours   Vitamins and Minerals oral tablet  take 1 tablet by oral route daily for 60 days   Zyprexa oral  --          Allergy List  Allergen Name Date Reaction Notes   tetracycline --  rash --        Allergies Reconciled  Family Medical History  Disease Name Relative/Age Notes   Melanoma Brother/   --    -Father's Family History Unknown Father/   Father   Family history of stroke Mother/   --    Family history of heart attack Mother/   --          Reproductive History  Menstrual   Pregnancy Summary   Total Pregnancies: 0 Full Term: 0 Premature: 0   Ab Induced: 0 Ab Spontaneous: 0 Ectopics: 0   Multiples: 0 Livin         Social History  Finding Status Start/Stop Quantity Notes   Alcohol Current some day --/-- --  drinks daily; wine and beer   Caffeine Current every day --/-- --  drinks regularly; 1-2 times per day   Second hand smoke exposure Never --/-- --  no   Tobacco Never --/--  "--  never a smoker         Immunizations  NameDate Admin Mfg Trade Name Lot Number Route Inj VIS Given VIS Publication   [id number]/21/2020 SKB Fluarix, quadrivalent, preservative free [id number] IM RD 10/21/2020    Comments: Pt tolerated well. Left the office in stable condition. PAKO MORRISSEY.         Review of Systems  · Constitutional  o Denies  o : appetite change, fatigue, weight gain, weight loss  · Breasts  o Admits  o : tenderness  o Denies  o : lumps, swelling, nipple discharge  · Cardiovascular  o Denies  o : chest pain, palpitations  · Respiratory  o Denies  o : frequent cough, shortness of breath, wheezing, apneas  · Gastrointestinal  o Denies  o : abdominal pain, vomiting, constipation, diarrhea  · Genitourinary  o Denies  o : dysuria, urinary frequency, urinary urgency, sexual dysfunction      Vitals  Date Time BP Position Site L\R Cuff Size HR RR TEMP (F) WT  HT  BMI kg/m2 BSA m2 O2 Sat FR L/min FiO2 HC       11/18/2020 11:19 /88 Sitting    114 - R   183lbs 0oz 5'  7\" 28.66 1.98       12/11/2020 01:01 /90 Sitting    105 - R  97.5 168lbs 7oz 5'  7\" 26.38 1.9 96 %  21%    12/15/2020 01:44 /92 Sitting    122 - R 16 96.6 179lbs 4oz 5'  7\" 28.07 1.96 99 %  21%          Physical Examination  · Constitutional  o Appearance  o : well-nourished, in no acute distress  · Respiratory  o Respiratory Effort  o : breathing unlabored  o Inspection of Chest  o : normal appearance  o Auscultation of Lungs  o : normal breath sounds throughout  · Cardiovascular  o Heart  o :   § Auscultation of Heart  § : regular rate and rhythm, no murmurs, gallops or rubs  · Breasts  o Inspection of Breasts  o : breasts symmetrical, no skin changes, no deformities present, no discharge present  o Palpation of Breasts, Axillae  o : no masses present on palpation; generalized tenderness on palpation  · Gastrointestinal  o Abdominal Examination  o : abdomen nontender to palpation, tone normal without rigidity or guarding, no " masses present, normal bowel sounds  · Genitourinary  o External Genitalia  o : no inflammation, no lesions present  o Vagina  o : normal vaginal vault, no discharge present, no inflammatory lesions present, no masses present  o Bladder  o : nontender to palpation  o Cervix  o : appearance healthy, no lesions present, nontender to palpation, no discharges, no bleeding present, normal midline position  o Uterus  o : nontender to palpation, no masses present, position midline/midplane  o Adnexa  o : no tenderness or masses present on bimanual examination  o Anus  o : no inflammation or lesions present  o Perineum  o : perineum within normal limits  · Lymphatic  o Axilla  o : no lymphadenopathy present  · Neurologic  o Mental Status Examination  o :   § Orientation  § : grossly oriented to person, place and time  o Gait and Station  o : normal gait, able to stand without difficulty  · Psychiatric  o Judgement and Insight  o : judgment and insight intact  o Mood and Affect  o : mood normal, affect appropriate              Assessment  · Routine gynecological examination     V72.31/Z01.419  Exam without concern. Breast tenderness likely secondary to hormonal changes, however patient to follow up for mammogram as scheduled for further evaluation. Will determine if further intervention is warranted based on results of PAP.  · Pap smear, as part of routine gynecological examination     V76.2/Z01.419    Problems Reconciled  Plan  · Orders  o Vaginal Screen (Candida, Gardnerella & Trichomonas) (60892) - V72.31/Z01.419 - 12/15/2020  o Pap smear (53640) - V76.2/Z01.419 - 12/15/2020  o ACO-39: Current medications updated and reviewed (1159F, ) - - 12/15/2020  · Medications  o Advair Diskus 500-50 mcg/dose inhalation blister with device   SIG: inhale 1 puff by inhalation route 2 times per day in the morning and evening approximately 12 hours apart   DISP: (1) Puff with 2 refills  Refilled on 12/15/2020     o Medications have  been Reconciled  o Transition of Care or Provider Policy  · Instructions  o **Pap Test/Liquid Based:   o Source:  o Cervix  o ********  o **Perform Reflex Human Papilloma Virus (HPV) High Risk on this Pap (If atypical squamous cells of the undetermined signifigcance (ASCUS)/Atypical Glandular Cells of undetermined significance (AGCUS): Low Grade Squamous Intraepitheal lesion (LGSIL): **  o No  o ********  o **Is this an annual PAP:  o Yes  o Counseled on monthly breast self exams.   o Counseled on diet and exercise.   o Counseled on weight-bearing exercise.  o Recommended Calcium with Vitamin D twice daily.  o Used cytobrush to obtain Pap smear specimen. Sent to pathology for testing and review.  o Patient was educated/instructed on their diagnosis, treatment and medications prior to discharge from the clinic today.            Electronically Signed by: AMANDA Catherine -Author on December 15, 2020 02:14:56 PM

## 2021-05-14 NOTE — PROGRESS NOTES
"   Progress Note      Patient Name: Shanthi Akers   Patient ID: [id number]   Sex: Female   YOB: 1960    Primary Care Provider: Charo PATEL   Referring Provider: Charo PATEL    Visit Date: April 19, 2021    Provider: AMANDA Catherine   Location: [facility]   Location Address: [address] [place] [address]  [id number]   Location Phone: [phone]          Chief Complaint  · Follow-up  · Elevated H & H  · UTI symptoms   · Back pain       History Of Present Illness  Shanthi Akers is a 60 year old /White female who presents for evaluation and treatment of:      Dysuria-  Patient reports dysuria, frequency, urgency, and low back pain x 2 days. Denies fever, chills, nausea, vomiting, hematuria.     Hypokalemia-  Potassium dosage increased one week ago, patient currently taking supplement 40 mEq BID. Due for repeat CMP in three weeks.     Thoracic back pain-  Patient also with thoracic back pain x \"a few days\". This started before urinary symptoms. Patient states she thinks she turned wrong in bed. Denies direct injury, numbness/tingling, weakness. Describes as a sharp pain, exacerbated by movement. Not improved with aspirin. Patient unable to take NSAIDs.     Patient in clinic for repeat labs, elevated glucose, RBC and H&H. Has pending HgbA1c and peripheral smear.       Past Medical History  Disease Name Date Onset Notes   Allergic rhinitis, chronic --  --    Asthma --  --    Broken Bones --  --    Colon cancer screening --  --    Depression with anxiety --  --    Essential hypertension --  --    GERD --  --    Hyperlipidemia --  --    Migraine headache --  --    Psychiatric disorder --  --    Shortness of Breath --  --    Sinus trouble --  --          Past Surgical History  Procedure Name Date Notes   Colon --  --    EGD --  --          Medication List  Name Date Started Instructions   Advair Diskus 500-50 mcg/dose inhalation blister with " device 12/15/2020 inhale 1 puff by inhalation route 2 times per day in the morning and evening approximately 12 hours apart   aspirin 81 mg oral tablet,chewable  chew 1 tablet (81 mg) by oral route once daily   biotin 5 mg oral capsule  take 1 capsule by oral route daily   Klonopin 1 mg oral tablet  take 1 tablet by oral route As needed   meclizine 25 mg oral tablet  take 1 tablet (25 mg) by oral route 3 times per day as needed   Miralax 17 gram oral powder in packet  take 1 packet (17 gram) mixed with 8 oz. water, juice, soda, coffee or tea by oral route BID as needed   MONTELUKAST SOD 10 MG TABLET 02/18/2021 TAKE ONE TABLET BY MOUTH ONCE EVERY EVENING   mupirocin 2 % topical ointment 02/17/2021 apply a small amount to the affected area by topical route 3 times per day   Omega-3 Fish Oil 300-1,000 mg oral capsule  take 1 capsule by oral route daily   omeprazole 40 mg oral capsule,delayed release(DR/EC) 03/17/2021 TAKE ONE CAPSULE BY MOUTH DAILY BEFORE A MEAL   Paxil 40 mg oral tablet  take 1 tablet (40 mg) by oral route once daily   potassium chloride 20 mEq oral tablet extended release 04/19/2021 take 2 tablet (40 meq) by oral route 2 times per day with food for 30 days   STOOL SOFTENER 100 MG SOFTGEL 03/17/2021 TAKE ONE CAPSULE BY MOUTH ONCE DAILY AT BEDTIME AS NEEDED   Super Thera Regla M oral tablet  take 1 tablet by oral route daily   Thera-Tabs M 27 mg iron-400 mcg oral tablet 01/19/2021 take 1 tablet by oral route daily for 90 days   Ventolin HFA 90 mcg/actuation inhalation HFA aerosol inhaler  inhale 2 puffs (180 mcg) by inhalation route every 6 hours   Vitamins and Minerals oral tablet  take 1 tablet by oral route daily for 60 days         Allergy List  Allergen Name Date Reaction Notes   Flagyl --  --  --    tetracycline --  rash --        Allergies Reconciled  Family Medical History  Disease Name Relative/Age Notes   Melanoma Brother/   --    -Father's Family History Unknown Father/   Father   Family  "history of stroke Mother/   --    Family history of heart attack Mother/   --          Reproductive History  Menstrual   Pregnancy Summary   Total Pregnancies: 0 Full Term: 0 Premature: 0   Ab Induced: 0 Ab Spontaneous: 0 Ectopics: 0   Multiples: 0 Livin         Social History  Finding Status Start/Stop Quantity Notes   Alcohol Current some day --/-- --  drinks daily; wine and beer   Caffeine Current every day --/-- --  drinks regularly; 1-2 times per day   Second hand smoke exposure Never --/-- --  no   Tobacco Never --/-- --  never a smoker         Immunizations  NameDate Admin Mfg Trade Name Lot Number Route Inj VIS Given VIS Publication   [id number]/ SKB Fluarix, quadrivalent, preservative free [id number] IM RD 10/21/2020    Comments: Pt tolerated well. Left the office in stable condition. PAKO MORRISSEY.         Review of Systems  · Constitutional  o Denies  o : fever, fatigue, weight loss, weight gain  · Cardiovascular  o Denies  o : lower extremity edema, chest pressure, palpitations  · Respiratory  o Denies  o : shortness of breath, wheezing, cough, dyspnea on exertion  · Gastrointestinal  o Denies  o : nausea, vomiting, diarrhea, constipation, abdominal pain  · Genitourinary  o Admits  o : dysuria  o Denies  o : urgency, frequency, hematuria  · Musculoskeletal  o Admits  o : limitation of motion, back pain  o Denies  o : joint pain      Vitals  Date Time BP Position Site L\R Cuff Size HR RR TEMP (F) WT  HT  BMI kg/m2 BSA m2 O2 Sat FR L/min FiO2 HC       2021 01:14 /66 Sitting    96 - R  98.2 166lbs 16oz 5'  7\" 26.16 1.89 98 %  21%    2021 01:44 /80 Sitting    99 - R  97.8 163lbs 6oz 5'  7\" 25.59 1.87 97 %      2021 03:37 /84 Sitting    79 - R  97.8 164lbs 6oz 5'  7\" 25.74 1.88 100 %  21%          Physical Examination  · Constitutional  o Appearance  o : no acute distress, well-nourished  · Head and Face  o Head  o :   § Inspection  § : atraumatic, " normocephalic  · Ears, Nose, Mouth and Throat  o Ears  o :   § External Ears  § : normal  o Nose  o :   § Intranasal Exam  § : nares patent  o Oral Cavity  o :   § Oral Mucosa  § : moist mucous membranes  · Respiratory  o Respiratory Effort  o : breathing comfortably, symmetric chest rise  o Auscultation of Lungs  o : clear to asculatation bilaterally, no wheezes, rales, or rhonchii  · Cardiovascular  o Heart  o :   § Auscultation of Heart  § : regular rate and rhythm, no murmurs, rubs, or gallops  o Peripheral Vascular System  o :   § Extremities  § : no edema  · Skin and Subcutaneous Tissue  o General Inspection  o : no lesions present, no areas of discoloration, skin turgor normal  · Neurologic  o Mental Status Examination  o :   § Orientation  § : grossly oriented to person, place and time  o Gait and Station  o :   § Gait Screening  § : normal gait     Musculoskeletal-  Muscle tone, strength, and development grossly normal. Without erythema, edema, ecchymosis. Full ROM. Pain exacerbated by active/passive ROM activities. Thoracic spine and paraspinal musculature tender on palpation.           Results  · In-Office Procedures  o Lab procedure  § IOP - Urinalysis without Microscopy (Clinitek) [facility] ([id number])   § Color Ur: Yellow   § Clarity Ur: Clear   § Glucose Ur Ql Strip: Negative   § Bilirub Ur Ql Strip: Negative   § Ketones Ur Ql Strip: Negative   § Sp Gr Ur Qn: 1.015   § Hgb Ur Ql Strip: Trace-Intact   § pH Ur-LsCnc: 7.0   § Prot Ur Ql Strip: 30 mg/dL   § Urobilinogen Ur Strip-mCnc: 1.0 E.U./dL   § Nitrite Ur Ql Strip: Negative   § WBC Est Ur Ql Strip: Trace       Assessment  · Impaired fasting glucose     790.21/R73.01  Noted on previous labs, HgbA1c in clinic today.  · Thoracic back pain     724.1/M54.6  Likely muscular in etiology. Will trial short course of muscle relaxer, patient aware of risk of drowsiness and to avoid with high risk activities. Ice/heat, stretching as tolerated. She will seek medical  attention immediately with severe/persistent pain, weakness, numbness/tingling. Could consider imaging and/or PT in the future if warranted.   · Elevated hemoglobin     282.7/D58.2  H&H and RBC elevated on previous labs, issue with lab in drawing peripheral smear. Labs pending for today. Erythropoietin normal. Peripheral smear in clinic today. Will consider referral to hematology for further evaluation based on results of labs.   · Elevated hematocrit     282.7/R71.8  · Dysuria     788.1/R30.0  Will treat with Macrobid, urine for micro and culture. Will determine if antibiotic adjustment is warranted based on results of culture. Increase fluids, monitor output. Patient to seek medical attention immediately with fever, chills, nausea, vomiting, flank pain. She will call or return to clinic with concerns. Will continue to monitor.   · Hypokalemia     276.8/E87.6  Continue increased potassium supplement, repeat CMP today. Will consider cancelling upcoming CMP if WNL on todays labs, otherwise repeat as scheduled.     Problems Reconciled  Plan  · Orders  o CMP [facility] ([id number]) - 790.21/R73.01, 282.7/D58.2, 282.7/R71.8, 276.8/E87.6 - 04/19/2021  o Urinalysis with Reflex Microscopy ([facility]) ([id number]) - 788.1/R30.0 - 04/19/2021  o Urine culture ([id number], 62032) - 788.1/R30.0 - 04/19/2021  o ACO-39: Current medications updated and reviewed (1159F, ) - - 04/19/2021  · Medications  o Macrobid 100 mg oral capsule   SIG: take 1 capsule (100 mg) by oral route every 12 hours with food for 5 days   DISP: (10) Capsule with 0 refills  Prescribed on 04/19/2021     o cyclobenzaprine 5 mg oral tablet   SIG: take 1 tablet by oral route 3 times a day as needed   DISP: (30) Tablet with 0 refills  Prescribed on 04/19/2021     o Medications have been Reconciled  o Transition of Care or Provider Policy  · Instructions  o Take all medications as prescribed/directed.  o Patient was educated/instructed on their diagnosis, treatment and medications  prior to discharge from the clinic today.  o Patient instructed to seek medical attention urgently for new or worsening symptoms.  o Call the office with any concerns or questions.  o Chronic conditions reviewed and taken into consideration for today's treatment plan.  · Disposition  o Call or Return if symptoms worsen or persist.  o Prescriptions sent to pharmacy  o Labs drawn in clinic  o Will call patient with results of labs  o Return Visit Request in/on 4 months +/- 2 days ([id number]).            Electronically Signed by: AMANDA Catherine -Author on April 19, 2021 04:30:26 PM

## 2021-05-14 NOTE — PROGRESS NOTES
"   Progress Note      Patient Name: Shanthi Akers   Patient ID: [id number]   Sex: Female   YOB: 1960    Primary Care Provider: Charo PATEL   Referring Provider: Charo PATEL    Visit Date: February 9, 2021    Provider: AMANDA Catherine   Location: [facility]   Location Address: [address] [place] [address]  [id number]   Location Phone: [phone]          Chief Complaint  · \" Having trouble sleeping\"  · \" Want you to look at rash on her arm\"      History Of Present Illness  Shanthi Akers is a 60 year old /White female who presents for evaluation and treatment of:      Patient reports she is having difficulty falling asleep, often takes until 3-6 AM before she falls asleep. Progressively worsening over the past 2 weeks, patient reports intermittent issues with sleep in the past but never to this extent. Patient reports she has been told she snores, does not feel rested when she wakes up. Melatonin has worked for her in the past but hasn't helped over the past two weeks. Patient reports she tried an Ambien many years ago from a friends prescription and woke up the next morning covered in dirt. Her sister is managed with trazodone and she is wondering if this is an option. Patient is currently managed with Paxil and Klonopin through psychiatry, has been off of Zyprexa for over one month.    Rash-  Patient evaluated in clinic 1/19 by Zenia Hill PA-C for rash. Was started on Keflex x 7 days. Patient reports rash has resolved almost completely, however she has a few places on her forearms and one on her left leg that seems to be taking longer to heal. Sites are itchy but have improved significantly from initial rash. Denies erythema, edema, warmth, streaking, fever, chills. Patient states she tries not to scratch the sites but does catch herself sometimes.       Past Medical History  Disease Name Date Onset Notes   Allergic rhinitis, " chronic --  --    Asthma --  --    Broken Bones --  --    Colon cancer screening --  --    Depression with anxiety --  --    Essential hypertension --  --    GERD --  --    Hyperlipidemia --  --    Migraine headache --  --    Psychiatric disorder --  --    Shortness of Breath --  --    Sinus trouble --  --          Past Surgical History  Procedure Name Date Notes   Colon --  --    EGD --  --          Medication List  Name Date Started Instructions   Advair Diskus 500-50 mcg/dose inhalation blister with device 12/15/2020 inhale 1 puff by inhalation route 2 times per day in the morning and evening approximately 12 hours apart   aspirin 81 mg oral tablet,chewable  chew 1 tablet (81 mg) by oral route once daily   biotin 5 mg oral capsule  take 1 capsule by oral route daily   Colace 100 mg oral capsule 12/11/2020 take 1 capsule (100 mg) by oral route once daily at bedtime as needed   Klonopin 1 mg oral tablet  take 1 tablet by oral route As needed   meclizine 25 mg oral tablet  take 1 tablet (25 mg) by oral route 3 times per day as needed   Miralax 17 gram oral powder in packet  take 1 packet (17 gram) mixed with 8 oz. water, juice, soda, coffee or tea by oral route BID as needed   Omega-3 Fish Oil 300-1,000 mg oral capsule  take 1 capsule by oral route daily   omeprazole 40 mg oral capsule,delayed release(DR/EC) 01/15/2021 TAKE ONE CAPSULE BY MOUTH DAILY BEFORE A MEAL   Paxil 40 mg oral tablet  take 1 tablet (40 mg) by oral route once daily   Singulair 10 mg oral tablet 12/11/2020 take 1 tablet (10 mg) by oral route once daily in the evening   Super Thera Regla M oral tablet  take 1 tablet by oral route daily   Thera-Tabs M 27 mg iron-400 mcg oral tablet 01/19/2021 take 1 tablet by oral route daily for 90 days   Ventolin HFA 90 mcg/actuation inhalation HFA aerosol inhaler  inhale 2 puffs (180 mcg) by inhalation route every 6 hours   Vitamins and Minerals oral tablet  take 1 tablet by oral route daily for 60 days  "        Allergy List  Allergen Name Date Reaction Notes   Flagyl --  --  --    tetracycline --  rash --        Allergies Reconciled  Family Medical History  Disease Name Relative/Age Notes   Melanoma Brother/   --    -Father's Family History Unknown Father/   Father   Family history of stroke Mother/   --    Family history of heart attack Mother/   --          Reproductive History  Menstrual   Pregnancy Summary   Total Pregnancies: 0 Full Term: 0 Premature: 0   Ab Induced: 0 Ab Spontaneous: 0 Ectopics: 0   Multiples: 0 Livin         Social History  Finding Status Start/Stop Quantity Notes   Alcohol Current some day --/-- --  drinks daily; wine and beer   Caffeine Current every day --/-- --  drinks regularly; 1-2 times per day   Second hand smoke exposure Never --/-- --  no   Tobacco Never --/-- --  never a smoker         Immunizations  NameDate Admin Mfg Trade Name Lot Number Route Inj VIS Given VIS Publication   [id number]/ SKB Fluarix, quadrivalent, preservative free [id number] IM RD 10/21/2020    Comments: Pt tolerated well. Left the office in stable condition. PAKO MORRISSEY.         Review of Systems  · Constitutional  o Denies  o : fever, fatigue, weight loss, weight gain  · Cardiovascular  o Denies  o : lower extremity edema, chest pressure, palpitations  · Respiratory  o Denies  o : shortness of breath, wheezing, cough, dyspnea on exertion  · Gastrointestinal  o Denies  o : nausea, vomiting, diarrhea, constipation, abdominal pain  · Integument  o Admits  o : rash, itching, new skin lesions  · Psychiatric  o Admits  o : anxiety, depression, difficulty sleeping  o Denies  o : suicidal ideation, homicidal ideation      Vitals  Date Time BP Position Site L\R Cuff Size HR RR TEMP (F) WT  HT  BMI kg/m2 BSA m2 O2 Sat FR L/min FiO2 HC       2020 01:01 /90 Sitting    105 - R  97.5 168lbs 7oz 5'  7\" 26.38 1.9 96 %  21%    12/15/2020 01:44 /92 Sitting    122 - R 16 96.6 179lbs 4oz 5'  7\" 28.07 1.96 " "99 %  21%    01/19/2021 02:31 /82 Sitting    102 - R  97 167lbs 6oz 5'  7\" 26.21 1.89 99 %  21%    02/09/2021 01:14 /66 Sitting    96 - R  98.2 166lbs 16oz 5'  7\" 26.16 1.89 98 %  21%          Physical Examination  · Constitutional  o Appearance  o : no acute distress, well-nourished  · Head and Face  o Head  o :   § Inspection  § : atraumatic, normocephalic  · Eyes  o Eyes  o : extraocular movements intact, no scleral icterus, no conjunctival injection  · Ears, Nose, Mouth and Throat  o Ears  o :   § External Ears  § : normal  o Nose  o :   § Intranasal Exam  § : nares patent  o Oral Cavity  o :   § Oral Mucosa  § : moist mucous membranes  · Respiratory  o Respiratory Effort  o : breathing comfortably, symmetric chest rise  o Auscultation of Lungs  o : clear to asculatation bilaterally, no wheezes, rales, or rhonchii  · Cardiovascular  o Heart  o :   § Auscultation of Heart  § : regular rate and rhythm, no murmurs, rubs, or gallops  o Peripheral Vascular System  o :   § Extremities  § : no edema  · Skin and Subcutaneous Tissue  o General Inspection  o : scattered papules in various stages of healing bilateral forearms and left leg; larger lesions to mid forearms and left calf with erythema and excoriation; without discharge, induration, warmth, streaking  · Neurologic  o Mental Status Examination  o :   § Orientation  § : grossly oriented to person, place and time  o Gait and Station  o :   § Gait Screening  § : normal gait  · Psychiatric  o General  o : normal mood and affect              Assessment  · Rash     782.1/R21  Appears to be healing. Will start topical mupirocin ointment. Keep areas clean and dry. Patient to monitor closely for worsening rash, erythema, edema, warmth, streaking, fever, chills. Will reevaluate at follow up in one month, sooner if concerns arise.  · Insomnia     780.52/G47.00  Will trial hydroxyzine at this time. Discussed risk of serotonin syndrome with trazodone in " combination with Paxil and increased risk of drowsiness in combination with clonazepam. She is aware of risk of drowsiness with hydroxyzine. Will follow up in one month to assess medication effectiveness, sooner if concerns arise. Will consider medication adjustments at that time. Could consider sleep medicine referral in the future for further evaluation, especially with patients report of snoring. Will continue to monitor.     Problems Reconciled  Plan  · Orders  o ACO-39: Current medications updated and reviewed (1159F, ) - - 02/09/2021  · Medications  o hydroxyzine HCl 25 mg oral tablet   SIG: take 1 tablet by oral route once a day (at bedtime) as needed   DISP: (30) Tablet with 0 refills  Prescribed on 02/09/2021     o mupirocin 2 % topical ointment   SIG: apply a small amount to the affected area by topical route 3 times per day   DISP: (1) Tube with 0 refills  Prescribed on 02/09/2021     o Zyprexa oral   SIG: ---   DISP: # 0 with 0 refills  Discontinued on 02/09/2021     o Medications have been Reconciled  o Transition of Care or Provider Policy  · Instructions  o Take all medications as prescribed/directed.  o Patient was educated/instructed on their diagnosis, treatment and medications prior to discharge from the clinic today.  o Patient instructed to seek medical attention urgently for new or worsening symptoms.  o Call the office with any concerns or questions.  o Chronic conditions reviewed and taken into consideration for today's treatment plan.  · Disposition  o Call or Return if symptoms worsen or persist.  o Follow up in 1 month  o Prescriptions sent to pharmacy            Electronically Signed by: AMANDA Catherine -Author on February 9, 2021 01:46:46 PM

## 2021-05-14 NOTE — PROGRESS NOTES
Progress Note      Patient Name: Shanthi Akers   Patient ID: [id number]   Sex: Female   YOB: 1960    Primary Care Provider: Charo PATEL   Referring Provider: Charo PATEL    Visit Date: March 9, 2021    Provider: AMANDA Catherine   Location: [facility]   Location Address: [address] [place] [address]  [id number]   Location Phone: [phone]          Chief Complaint  · follow up   · rash      History Of Present Illness  Shanthi Akers is a 60 year old /White female who presents for evaluation and treatment of:      Rash-  Patient reports rash persists and she feels like she has developed new lesions. Patient has been previously treated with Keflex and mupirocin ointment. Sites are no longer itchy but are mostly scabbed over. Patient was evaluated in Urgent Care and prescribed permethrin for scabies. She has not taken this because she does not feel like the rash is scabies. Patient does continue to scratch at the sites but does so through her clothing. She would like to be evaluated by dermatology at this time.     Electrolyte imbalance noted on previous labs, due for repeat. Patient states she has been drinking Gatorade and Pedialyte to improve.       Past Medical History  Disease Name Date Onset Notes   Allergic rhinitis, chronic --  --    Asthma --  --    Broken Bones --  --    Colon cancer screening --  --    Depression with anxiety --  --    Essential hypertension --  --    GERD --  --    Hyperlipidemia --  --    Migraine headache --  --    Psychiatric disorder --  --    Shortness of Breath --  --    Sinus trouble --  --          Past Surgical History  Procedure Name Date Notes   Colon --  --    EGD --  --          Medication List  Name Date Started Instructions   Advair Diskus 500-50 mcg/dose inhalation blister with device 12/15/2020 inhale 1 puff by inhalation route 2 times per day in the morning and evening approximately 12  hours apart   aspirin 81 mg oral tablet,chewable  chew 1 tablet (81 mg) by oral route once daily   biotin 5 mg oral capsule  take 1 capsule by oral route daily   Colace 100 mg oral capsule 12/11/2020 take 1 capsule (100 mg) by oral route once daily at bedtime as needed   hydroxyzine HCl 25 mg oral tablet 02/09/2021 take 1 tablet by oral route once a day (at bedtime) as needed   Klonopin 1 mg oral tablet  take 1 tablet by oral route As needed   meclizine 25 mg oral tablet  take 1 tablet (25 mg) by oral route 3 times per day as needed   Miralax 17 gram oral powder in packet  take 1 packet (17 gram) mixed with 8 oz. water, juice, soda, coffee or tea by oral route BID as needed   MONTELUKAST SOD 10 MG TABLET 02/18/2021 TAKE ONE TABLET BY MOUTH ONCE EVERY EVENING   mupirocin 2 % topical ointment 02/17/2021 apply a small amount to the affected area by topical route 3 times per day   Omega-3 Fish Oil 300-1,000 mg oral capsule  take 1 capsule by oral route daily   omeprazole 40 mg oral capsule,delayed release(DR/EC) 01/15/2021 TAKE ONE CAPSULE BY MOUTH DAILY BEFORE A MEAL   Paxil 40 mg oral tablet  take 1 tablet (40 mg) by oral route once daily   Super Thera Regla M oral tablet  take 1 tablet by oral route daily   Thera-Tabs M 27 mg iron-400 mcg oral tablet 01/19/2021 take 1 tablet by oral route daily for 90 days   Ventolin HFA 90 mcg/actuation inhalation HFA aerosol inhaler  inhale 2 puffs (180 mcg) by inhalation route every 6 hours   Vitamins and Minerals oral tablet  take 1 tablet by oral route daily for 60 days         Allergy List  Allergen Name Date Reaction Notes   Flagyl --  --  --    tetracycline --  rash --        Allergies Reconciled  Family Medical History  Disease Name Relative/Age Notes   Melanoma Brother/   --    -Father's Family History Unknown Father/   Father   Family history of stroke Mother/   --    Family history of heart attack Mother/   --          Reproductive History  Menstrual   Pregnancy Summary  "  Total Pregnancies: 0 Full Term: 0 Premature: 0   Ab Induced: 0 Ab Spontaneous: 0 Ectopics: 0   Multiples: 0 Livin         Social History  Finding Status Start/Stop Quantity Notes   Alcohol Current some day --/-- --  drinks daily; wine and beer   Caffeine Current every day --/-- --  drinks regularly; 1-2 times per day   Second hand smoke exposure Never --/-- --  no   Tobacco Never --/-- --  never a smoker         Immunizations  NameDate Admin Mfg Trade Name Lot Number Route Inj VIS Given VIS Publication   [id number]/ SKB Fluarix, quadrivalent, preservative free [id number] IM RD 10/21/2020    Comments: Pt tolerated well. Left the office in stable condition. PAKO MORRISSEY.         Review of Systems  · Constitutional  o Denies  o : fever, fatigue, weight loss, weight gain  · Cardiovascular  o Denies  o : lower extremity edema, chest pressure, palpitations  · Respiratory  o Denies  o : shortness of breath, wheezing, cough, dyspnea on exertion  · Gastrointestinal  o Denies  o : nausea, vomiting, diarrhea, constipation, abdominal pain  · Integument  o Admits  o : rash, new skin lesions  o Denies  o : itching      Vitals  Date Time BP Position Site L\R Cuff Size HR RR TEMP (F) WT  HT  BMI kg/m2 BSA m2 O2 Sat FR L/min FiO2 HC       2021 02:31 /82 Sitting    102 - R  97 167lbs 6oz 5'  7\" 26.21 1.89 99 %  21%    2021 01:14 /66 Sitting    96 - R  98.2 166lbs 16oz 5'  7\" 26.16 1.89 98 %  21%    2021 01:44 /80 Sitting    99 - R  97.8 163lbs 6oz 5'  7\" 25.59 1.87 97 %            Physical Examination  · Constitutional  o Appearance  o : no acute distress, well-nourished  · Head and Face  o Head  o :   § Inspection  § : atraumatic, normocephalic  · Ears, Nose, Mouth and Throat  o Ears  o :   § External Ears  § : normal  o Nose  o :   § Intranasal Exam  § : nares patent  o Oral Cavity  o :   § Oral Mucosa  § : moist mucous membranes  · Respiratory  o Respiratory Effort  o : breathing " comfortably, symmetric chest rise  o Auscultation of Lungs  o : clear to asculatation bilaterally, no wheezes, rales, or rhonchii  · Cardiovascular  o Heart  o :   § Auscultation of Heart  § : regular rate and rhythm, no murmurs, rubs, or gallops  o Peripheral Vascular System  o :   § Extremities  § : no edema  · Skin and Subcutaneous Tissue  o General Inspection  o : scattered scabbed papular lesions to arms, bilateral legs, buttocks; in various stages of healing; without erythema, warmth, streaking, discharge; few areas of excoriation  · Neurologic  o Mental Status Examination  o :   § Orientation  § : grossly oriented to person, place and time  o Gait and Station  o :   § Gait Screening  § : normal gait              Assessment  · Rash     782.1/R21  Rash appears to be resolving, scattered scabs in various stages of healing. Continue topical antibiotic ointment as previously prescribed. Due to duration and severity of symptoms as well as patient request will refer to dermatology for further evaluation. Patient to call or return to clinic with concerns. Will continue to monitor.   · Hyponatremia     276.1/E87.1  Repeat CMP in clinic today.   · Hypokalemia     276.8/E87.6  Repeat CMP in clinic today.   · Elevated hemoglobin     282.7/D58.2  Repeat CBC in clinic today.   · Elevated hematocrit     282.7/R71.8  Repeat CBC in clinic today.     Problems Reconciled  Plan  · Orders  o CBC with Auto Diff [facility] ([id number]) - 282.7/D58.2, 282.7/R71.8 - 03/09/2021  o CMP [facility] ([id number]) - 276.1/E87.1, 276.8/E87.6 - 03/09/2021  o ACO-39: Current medications updated and reviewed (, 1159F) - - 03/09/2021  o DERMATOLOGY CONSULTATION (DERMA) - 782.1/R21 - 03/09/2021   Would like ASAP please  · Medications  o Medications have been Reconciled  o Transition of Care or Provider Policy  · Instructions  o Patient was educated/instructed on their diagnosis, treatment and medications prior to discharge from the clinic today.  o Patient  instructed to seek medical attention urgently for new or worsening symptoms.  o Call the office with any concerns or questions.  · Disposition  o Call or Return if symptoms worsen or persist.  o Follow up in 6 weeks  o Labs drawn in clinic  o Will call patient with results of labs  · Referrals  o Inbound Referral/Transition            Electronically Signed by: AMANDA Catherine -Author on March 9, 2021 05:15:28 PM

## 2021-05-15 VITALS — WEIGHT: 217 LBS | BODY MASS INDEX: 34.06 KG/M2 | RESPIRATION RATE: 16 BRPM | HEIGHT: 67 IN

## 2021-05-15 VITALS — BODY MASS INDEX: 33.74 KG/M2 | HEART RATE: 93 BPM | WEIGHT: 215 LBS | HEIGHT: 67 IN

## 2021-06-07 ENCOUNTER — HOSPITAL ENCOUNTER (EMERGENCY)
Facility: HOSPITAL | Age: 61
Discharge: HOME | End: 2021-06-07
Attending: NURSE PRACTITIONER | Admitting: EMERGENCY MEDICINE

## 2021-06-07 ENCOUNTER — APPOINTMENT (OUTPATIENT)
Dept: CT IMAGING | Facility: HOSPITAL | Age: 61
End: 2021-06-07

## 2021-06-07 VITALS
HEIGHT: 67 IN | RESPIRATION RATE: 18 BRPM | WEIGHT: 166.23 LBS | SYSTOLIC BLOOD PRESSURE: 138 MMHG | OXYGEN SATURATION: 100 % | HEART RATE: 80 BPM | TEMPERATURE: 98.2 F | BODY MASS INDEX: 26.09 KG/M2 | DIASTOLIC BLOOD PRESSURE: 87 MMHG

## 2021-06-07 DIAGNOSIS — R10.12 LEFT UPPER QUADRANT ABDOMINAL PAIN: Primary | ICD-10-CM

## 2021-06-07 DIAGNOSIS — M62.830 BACK SPASM: ICD-10-CM

## 2021-06-07 LAB
ALBUMIN SERPL-MCNC: 4 G/DL (ref 3.5–5.2)
ALBUMIN/GLOB SERPL: 1.3 G/DL
ALP SERPL-CCNC: 137 U/L (ref 39–117)
ALT SERPL W P-5'-P-CCNC: 21 U/L (ref 1–33)
ANION GAP SERPL CALCULATED.3IONS-SCNC: 9.5 MMOL/L (ref 5–15)
AST SERPL-CCNC: 27 U/L (ref 1–32)
BASOPHILS # BLD AUTO: 0.09 10*3/MM3 (ref 0–0.2)
BASOPHILS NFR BLD AUTO: 1.1 % (ref 0–1.5)
BILIRUB SERPL-MCNC: 0.3 MG/DL (ref 0–1.2)
BILIRUB UR QL STRIP: NEGATIVE
BUN SERPL-MCNC: 11 MG/DL (ref 6–20)
BUN/CREAT SERPL: 12.1 (ref 7–25)
CALCIUM SPEC-SCNC: 9.5 MG/DL (ref 8.6–10.5)
CHLORIDE SERPL-SCNC: 100 MMOL/L (ref 98–107)
CLARITY UR: CLEAR
CO2 SERPL-SCNC: 27.5 MMOL/L (ref 22–29)
COLOR UR: YELLOW
CREAT SERPL-MCNC: 0.91 MG/DL (ref 0.57–1)
DEPRECATED RDW RBC AUTO: 46.7 FL (ref 37–54)
EOSINOPHIL # BLD AUTO: 0.18 10*3/MM3 (ref 0–0.4)
EOSINOPHIL NFR BLD AUTO: 2.2 % (ref 0.3–6.2)
ERYTHROCYTE [DISTWIDTH] IN BLOOD BY AUTOMATED COUNT: 13.7 % (ref 12.3–15.4)
GFR SERPL CREATININE-BSD FRML MDRD: 63 ML/MIN/1.73
GLOBULIN UR ELPH-MCNC: 3.2 GM/DL
GLUCOSE SERPL-MCNC: 95 MG/DL (ref 65–99)
GLUCOSE UR STRIP-MCNC: NEGATIVE MG/DL
HCT VFR BLD AUTO: 41.4 % (ref 34–46.6)
HGB BLD-MCNC: 13.8 G/DL (ref 12–15.9)
HGB UR QL STRIP.AUTO: NEGATIVE
HOLD SPECIMEN: NORMAL
HOLD SPECIMEN: NORMAL
IMM GRANULOCYTES # BLD AUTO: 0.04 10*3/MM3 (ref 0–0.05)
IMM GRANULOCYTES NFR BLD AUTO: 0.5 % (ref 0–0.5)
KETONES UR QL STRIP: NEGATIVE
LEUKOCYTE ESTERASE UR QL STRIP.AUTO: NEGATIVE
LIPASE SERPL-CCNC: 26 U/L (ref 13–60)
LYMPHOCYTES # BLD AUTO: 2.59 10*3/MM3 (ref 0.7–3.1)
LYMPHOCYTES NFR BLD AUTO: 31.2 % (ref 19.6–45.3)
MCH RBC QN AUTO: 31.2 PG (ref 26.6–33)
MCHC RBC AUTO-ENTMCNC: 33.3 G/DL (ref 31.5–35.7)
MCV RBC AUTO: 93.7 FL (ref 79–97)
MONOCYTES # BLD AUTO: 0.67 10*3/MM3 (ref 0.1–0.9)
MONOCYTES NFR BLD AUTO: 8.1 % (ref 5–12)
NEUTROPHILS NFR BLD AUTO: 4.74 10*3/MM3 (ref 1.7–7)
NEUTROPHILS NFR BLD AUTO: 56.9 % (ref 42.7–76)
NITRITE UR QL STRIP: NEGATIVE
NRBC BLD AUTO-RTO: 0 /100 WBC (ref 0–0.2)
PH UR STRIP.AUTO: 8.5 [PH] (ref 5–8)
PLATELET # BLD AUTO: 391 10*3/MM3 (ref 140–450)
PMV BLD AUTO: 9.3 FL (ref 6–12)
POTASSIUM SERPL-SCNC: 3.8 MMOL/L (ref 3.5–5.2)
PROT SERPL-MCNC: 7.2 G/DL (ref 6–8.5)
PROT UR QL STRIP: NEGATIVE
RBC # BLD AUTO: 4.42 10*6/MM3 (ref 3.77–5.28)
SODIUM SERPL-SCNC: 137 MMOL/L (ref 136–145)
SP GR UR STRIP: 1.01 (ref 1–1.03)
UROBILINOGEN UR QL STRIP: ABNORMAL
WBC # BLD AUTO: 8.31 10*3/MM3 (ref 3.4–10.8)
WHOLE BLOOD HOLD SPECIMEN: NORMAL

## 2021-06-07 PROCEDURE — 99283 EMERGENCY DEPT VISIT LOW MDM: CPT

## 2021-06-07 PROCEDURE — 81003 URINALYSIS AUTO W/O SCOPE: CPT | Performed by: EMERGENCY MEDICINE

## 2021-06-07 PROCEDURE — 25010000002 KETOROLAC TROMETHAMINE PER 15 MG: Performed by: NURSE PRACTITIONER

## 2021-06-07 PROCEDURE — 0 IOPAMIDOL PER 1 ML: Performed by: EMERGENCY MEDICINE

## 2021-06-07 PROCEDURE — 96374 THER/PROPH/DIAG INJ IV PUSH: CPT

## 2021-06-07 PROCEDURE — 74177 CT ABD & PELVIS W/CONTRAST: CPT

## 2021-06-07 PROCEDURE — 80053 COMPREHEN METABOLIC PANEL: CPT | Performed by: EMERGENCY MEDICINE

## 2021-06-07 PROCEDURE — 85025 COMPLETE CBC W/AUTO DIFF WBC: CPT | Performed by: EMERGENCY MEDICINE

## 2021-06-07 PROCEDURE — 83690 ASSAY OF LIPASE: CPT | Performed by: EMERGENCY MEDICINE

## 2021-06-07 RX ORDER — SODIUM CHLORIDE 0.9 % (FLUSH) 0.9 %
10 SYRINGE (ML) INJECTION AS NEEDED
Status: DISCONTINUED | OUTPATIENT
Start: 2021-06-07 | End: 2021-06-08 | Stop reason: HOSPADM

## 2021-06-07 RX ORDER — KETOROLAC TROMETHAMINE 30 MG/ML
15 INJECTION, SOLUTION INTRAMUSCULAR; INTRAVENOUS ONCE
Status: COMPLETED | OUTPATIENT
Start: 2021-06-07 | End: 2021-06-07

## 2021-06-07 RX ORDER — POTASSIUM CHLORIDE 750 MG/1
40 TABLET, FILM COATED, EXTENDED RELEASE ORAL 2 TIMES DAILY
COMMUNITY
End: 2021-07-26

## 2021-06-07 RX ORDER — SODIUM CHLORIDE 9 MG/ML
INJECTION, SOLUTION INTRAVENOUS
Status: DISCONTINUED
Start: 2021-06-07 | End: 2021-06-08 | Stop reason: HOSPADM

## 2021-06-07 RX ORDER — CYCLOBENZAPRINE HCL 10 MG
10 TABLET ORAL 3 TIMES DAILY PRN
Qty: 15 TABLET | Refills: 0 | Status: SHIPPED | OUTPATIENT
Start: 2021-06-07 | End: 2021-06-12

## 2021-06-07 RX ADMIN — KETOROLAC TROMETHAMINE 15 MG: 30 INJECTION, SOLUTION INTRAMUSCULAR; INTRAVENOUS at 21:22

## 2021-06-07 RX ADMIN — SODIUM CHLORIDE 1000 ML: 9 INJECTION, SOLUTION INTRAVENOUS at 22:21

## 2021-06-07 RX ADMIN — IOPAMIDOL 100 ML: 755 INJECTION, SOLUTION INTRAVENOUS at 21:07

## 2021-06-08 NOTE — ED PROVIDER NOTES
Subjective   Patient complains of left upper and right upper abdominal pain x1 week that worsened yesterday.  She denies vomiting but reports constipation x5 days.      History provided by:  Patient      Review of Systems   Constitutional: Negative for chills and fever.   HENT: Negative for congestion, ear pain and sore throat.    Eyes: Negative for pain.   Respiratory: Negative for cough, chest tightness and shortness of breath.    Cardiovascular: Negative for chest pain.   Gastrointestinal: Positive for abdominal pain and constipation. Negative for anal bleeding, blood in stool, diarrhea, nausea and vomiting.   Genitourinary: Positive for flank pain (left). Negative for hematuria.   Musculoskeletal: Negative for joint swelling.   Skin: Negative for pallor.   Neurological: Negative for seizures and headaches.   All other systems reviewed and are negative.      Past Medical History:   Diagnosis Date   • Anxiety    • Asthma    • Depression    • Seasonal allergies        Allergies   Allergen Reactions   • Amoxicillin-Pot Clavulanate GI Intolerance   • Esomeprazole Hives   • Metronidazole Hives and Itching   • Tetracycline Other (See Comments) and Rash     Skin peeling on palms and feet         Past Surgical History:   Procedure Laterality Date   • ENDOMETRIAL ABLATION     • HAMMER TOE REPAIR Left    • [facility] NEUROMA EXCISION Right        Family History   Problem Relation Age of Onset   • Heart failure Mother    • Hyperlipidemia Mother    • Hypertension Mother    • Rheumatic fever Mother    • Melanoma Brother        Social History     Socioeconomic History   • Marital status:      Spouse name: Not on file   • Number of children: Not on file   • Years of education: Not on file   • Highest education level: Not on file   Tobacco Use   • Smoking status: Never Smoker   • Smokeless tobacco: Never Used   Substance and Sexual Activity   • Drug use: Never           Objective   Physical Exam  Vitals and nursing note  reviewed.   Constitutional:       General: She is not in acute distress.     Appearance: Normal appearance. She is not toxic-appearing.   HENT:      Head: Normocephalic and atraumatic.      Mouth/Throat:      Mouth: Mucous membranes are moist.   Eyes:      Extraocular Movements: Extraocular movements intact.      Pupils: Pupils are equal, round, and reactive to light.   Cardiovascular:      Rate and Rhythm: Normal rate and regular rhythm.      Pulses: Normal pulses.      Heart sounds: Normal heart sounds.   Pulmonary:      Effort: Pulmonary effort is normal. No respiratory distress.      Breath sounds: Normal breath sounds.   Abdominal:      General: Abdomen is flat.      Palpations: Abdomen is soft.      Tenderness: There is abdominal tenderness in the right upper quadrant, epigastric area and left upper quadrant.   Musculoskeletal:         General: Normal range of motion.      Cervical back: Normal range of motion and neck supple.   Skin:     General: Skin is warm and dry.      Capillary Refill: Capillary refill takes less than 2 seconds.   Neurological:      Mental Status: She is alert and oriented to person, place, and time. Mental status is at baseline.         Procedures           ED Course  ED Course as of Jun 13 0610 Mon Jun 07, 2021   1602 Patient reports improvement in pain after Toradol.  She reports she has medications at home to treat her constipation.  I feel that her abdominal pain is most likely due to her constipation, especially with a negative CT result.  The pain in her right flank and upper lumbar spine is worse with movement, I feel this is a muscle strain causing the pain in her flank and back.  I will prescribe a course of muscle relaxers to see if her pain improves.  She is to follow-up with her primary care provider and verbalized understanding if pain does not improve.    [CM]      ED Course User Index  [CM] Chris Schofield APRN                                            MDM  Number of Diagnoses or Management Options     Amount and/or Complexity of Data Reviewed  Tests in the medicine section of CPT®: reviewed    Patient Progress  Patient progress: improved      Final diagnoses:   Left upper quadrant abdominal pain   Back spasm       ED Disposition  ED Disposition     ED Disposition Condition Comment    Discharge Stable           Charo Carty, APRN  [address] [id number] [phone]      If symptoms worsen         Medication List      ASK your doctor about these medications    cyclobenzaprine 10 MG tablet  Commonly known as: FLEXERIL  Take 1 tablet by mouth 3 (Three) Times a Day As Needed for Muscle Spasms for up to 5 days.  Ask about: Should I take this medication?           Where to Get Your Medications      These medications were sent to [facility] [address] [place] [address], [place] - [address] AT [facility] [address] - [phone]  - [phone]   [address] [facility] [address]    Phone: [phone]   · cyclobenzaprine 10 MG tablet          Chris Schofield, APRN  06/07/21 2250       Chris Schofield, APRN  06/13/21 0611

## 2021-08-03 PROCEDURE — 87086 URINE CULTURE/COLONY COUNT: CPT | Performed by: NURSE PRACTITIONER

## 2021-08-05 ENCOUNTER — TELEPHONE (OUTPATIENT)
Dept: URGENT CARE | Facility: CLINIC | Age: 61
End: 2021-08-05

## 2021-08-05 NOTE — TELEPHONE ENCOUNTER
----- Message from AMANDA Olivarez sent at 8/5/2021  9:56 AM EDT -----  Please notify patient of negative urine culture result.  Can stop Macrobid.  Recommend follow-up with PCP if still symptomatic.

## 2021-08-20 RX ORDER — MULTIVIT-MIN/IRON FUM/FOLIC AC 19 MG-400
TABLET ORAL
Qty: 90 TABLET | Refills: 3 | Status: SHIPPED | OUTPATIENT
Start: 2021-08-20 | End: 2021-11-17

## 2021-08-23 RX ORDER — OMEPRAZOLE 40 MG/1
CAPSULE, DELAYED RELEASE ORAL
Qty: 90 CAPSULE | Refills: 0 | Status: SHIPPED | OUTPATIENT
Start: 2021-08-23 | End: 2021-11-16

## 2021-08-25 ENCOUNTER — TELEPHONE (OUTPATIENT)
Dept: INTERNAL MEDICINE | Facility: CLINIC | Age: 61
End: 2021-08-25

## 2021-08-25 RX ORDER — POTASSIUM CHLORIDE 1500 MG/1
TABLET, FILM COATED, EXTENDED RELEASE ORAL
Qty: 180 TABLET | Refills: 0 | Status: SHIPPED | OUTPATIENT
Start: 2021-08-25 | End: 2021-11-01

## 2021-08-25 NOTE — TELEPHONE ENCOUNTER
Caller: Shanthi Akers    Relationship: Self    Best call back number: [phone]    What is the best time to reach you: ANYTIME    Who are you requesting to speak with (clinical staff, provider,  specific staff member): NURSE     What was the call regarding: PATIENT HAS YET TO RECEIVE HER PRESCRIPTIONS, PHARMACY STILL HAS NOT RECEIVED THEM.  PLEASE CALL WHEN SENT TO PHARMACY     PATIENT IS TOTALLY OUT OF THESE AT THIS TIME.     Do you require a callback: YES

## 2021-08-25 NOTE — TELEPHONE ENCOUNTER
Called and let patient know her meds were at the pharmacy. No other issues or concerns noted currently per patient.

## 2021-09-27 ENCOUNTER — OFFICE VISIT (OUTPATIENT)
Dept: ORTHOPEDIC SURGERY | Facility: CLINIC | Age: 61
End: 2021-09-27

## 2021-09-27 VITALS — HEART RATE: 93 BPM | OXYGEN SATURATION: 98 % | BODY MASS INDEX: 27.04 KG/M2 | HEIGHT: 68 IN | WEIGHT: 178.4 LBS

## 2021-09-27 DIAGNOSIS — M76.32 ILIOTIBIAL BAND SYNDROME OF LEFT SIDE: ICD-10-CM

## 2021-09-27 DIAGNOSIS — M25.562 LEFT KNEE PAIN, UNSPECIFIED CHRONICITY: Primary | ICD-10-CM

## 2021-09-27 PROCEDURE — 99203 OFFICE O/P NEW LOW 30 MIN: CPT | Performed by: STUDENT IN AN ORGANIZED HEALTH CARE EDUCATION/TRAINING PROGRAM

## 2021-09-27 NOTE — PROGRESS NOTES
"Chief Complaint  Follow-up of the Left Knee    Subjective          Shanthi Akers presents to [facility] for   History of Present Illness    Shanthi presents today for an evaluation of left knee pain. On June 21st she reports she had a fall and has had persistent pain ever since. States she gets minimal sleep at night and has to pick her leg up to get into bed. Takes 4 Ibuprofen daily and applies Voltaren gel as needed. Reports she is unable to cross legs.  She denies any hip pain.  She denies any distal numbness.  She has not had any formal treatment at this time.       Allergies   Allergen Reactions   • Amoxicillin-Pot Clavulanate GI Intolerance   • Esomeprazole Hives   • Metronidazole Hives and Itching   • Tetracycline Other (See Comments) and Rash     Skin peeling on palms and feet          Social History     Socioeconomic History   • Marital status:      Spouse name: Not on file   • Number of children: Not on file   • Years of education: Not on file   • Highest education level: Not on file   Tobacco Use   • Smoking status: Never Smoker   • Smokeless tobacco: Never Used   Vaping Use   • Vaping Use: Never used   Substance and Sexual Activity   • Alcohol use: Yes     Alcohol/week: 8.0 standard drinks     Types: 4 Glasses of wine, 4 Standard drinks or equivalent per week     Comment: WINE ALCOHOL   • Drug use: Never   • Sexual activity: Defer        I reviewed the patient's chief complaint, history of present illness, review of systems, past medical history, surgical history, family history, social history, medications, and allergy list.     REVIEW OF SYSTEMS    Constitutional: Denies fevers, chills, weight loss  Cardiovascular: Denies chest pain, shortness of breath  Skin: Denies rashes, acute skin changes  Neurologic: Denies headache, loss of consciousness  MSK: left knee pain      Objective   Vital Signs:   Pulse 93   Ht 172.7 cm (68\")   Wt 80.9 kg (178 lb 6.4 oz)   " SpO2 98%   BMI 27.13 kg/m²     Body mass index is 27.13 kg/m².    Physical Exam    General: Alert, no acute distress  Gait: Antalgic gait favoring left  Left lower extremity: Nontender over tibia tubricle 4 patella tendon. No knee effusion.  No joint line pain.  120 degrees knee flexion. Well healed abrasions over anterior knee. Stable to varus/valgus stress. Stable to Lachman and posterior drawer test. No pain with Jessica. Calf soft. Intact sensation over dorsal and plantar foot. Intact active ankle dorsiflexion and plantarflexion. Palpable dorsalis pedis pulse. No groin pain with passive hip ROM. Pain along IT band and greater trochanter proximally. Nontender over ASIS. 5/5 hip abduction.    Procedures    Imaging Results (Most Recent)     Procedure Component Value Units Date/Time    XR Hip With or Without Pelvis 2 - 3 View Left [[id number]] Resulted: 09/27/21 1631     Updated: 09/27/21 1632    Narrative:      Indications: Left hip pain    Views: AP pelvis, AP and frog lateral left hip    Findings: No fractures noted.  Mild arthritic changes in the hip are   symmetric to the contralateral side.  Cam deformity noted on the frog   lateral view.  Pelvic ring intact.    Comparative Data: No comparative data available      XR Knee 3 View Left [[id number]] Resulted: 09/27/21 1630     Updated: 09/27/21 1631    Narrative:      Indications: Left knee pain    Views: Weightbearing AP, PA flexion, lateral, sunrise left knee    Findings: No fractures noted.  Ossicle adjacent to the medial femoral   condyle possible small suprapatellar effusion.  No advanced arthritic   changes seen.  Alignment neutral.    Comparative Data: Comparative data found and reviewed today                Assessment and Plan    Diagnoses and all orders for this visit:    1. Left knee pain, unspecified chronicity (Primary)  -     XR Knee 3 View Left  -     XR Hip With or Without Pelvis 2 - 3 View Left    2. Iliotibial band syndrome of left side  -      Ambulatory Referral to Physical Therapy Evaluate and treat, Ortho; Heat; Moist heat, Ultrasound, Phonophoresis; Stretching, ROM, Strengthening; Full weight bearing        Shanthi has IT band syndrome. Discussed nonoperative management options. Discussed antiinflammatories, Voltaren gel, and formal therapy. Patient expressed understanding and wished to proceed with therapy. Wrote therapy order. Follow up in 4 weeks to reevaluate. No new xrays are needed next visit.     Scribed for Anuj Michele MD by Elisa Smith MA.  09/27/21   13:48 EDT        Follow Up   Return in about 4 weeks (around 10/25/2021).  Patient was given instructions and counseling regarding her condition or for health maintenance advice. Please see specific information pulled into the AVS if appropriate.       I have personally performed the services described in this document as scribed by the above individual and it is both accurate and complete.     Anuj Michele MD  09/30/21  10:49 EDT

## 2021-09-29 ENCOUNTER — TELEPHONE (OUTPATIENT)
Dept: INTERNAL MEDICINE | Facility: CLINIC | Age: 61
End: 2021-09-29

## 2021-09-29 ENCOUNTER — OFFICE VISIT (OUTPATIENT)
Dept: INTERNAL MEDICINE | Facility: CLINIC | Age: 61
End: 2021-09-29

## 2021-09-29 VITALS
HEART RATE: 120 BPM | TEMPERATURE: 97.9 F | SYSTOLIC BLOOD PRESSURE: 136 MMHG | DIASTOLIC BLOOD PRESSURE: 90 MMHG | HEIGHT: 68 IN | BODY MASS INDEX: 26.71 KG/M2 | WEIGHT: 176.25 LBS | OXYGEN SATURATION: 99 %

## 2021-09-29 DIAGNOSIS — F41.9 ANXIETY: ICD-10-CM

## 2021-09-29 DIAGNOSIS — F33.0 MAJOR DEPRESSIVE DISORDER, RECURRENT, MILD (HCC): ICD-10-CM

## 2021-09-29 DIAGNOSIS — R03.0 ELEVATED BLOOD PRESSURE READING: ICD-10-CM

## 2021-09-29 DIAGNOSIS — K21.00 GASTROESOPHAGEAL REFLUX DISEASE WITH ESOPHAGITIS WITHOUT HEMORRHAGE: Primary | ICD-10-CM

## 2021-09-29 DIAGNOSIS — E87.6 HYPOKALEMIA: ICD-10-CM

## 2021-09-29 DIAGNOSIS — J45.20 MILD INTERMITTENT ASTHMA WITHOUT COMPLICATION: ICD-10-CM

## 2021-09-29 DIAGNOSIS — E78.5 HYPERLIPIDEMIA, UNSPECIFIED HYPERLIPIDEMIA TYPE: ICD-10-CM

## 2021-09-29 LAB
ALBUMIN SERPL-MCNC: 4.6 G/DL (ref 3.5–5.2)
ALBUMIN/GLOB SERPL: 1.3 G/DL
ALP SERPL-CCNC: 145 U/L (ref 39–117)
ALT SERPL W P-5'-P-CCNC: 35 U/L (ref 1–33)
ANION GAP SERPL CALCULATED.3IONS-SCNC: 11.4 MMOL/L (ref 5–15)
AST SERPL-CCNC: 39 U/L (ref 1–32)
BASOPHILS # BLD AUTO: 0.09 10*3/MM3 (ref 0–0.2)
BASOPHILS NFR BLD AUTO: 1.2 % (ref 0–1.5)
BILIRUB SERPL-MCNC: 0.3 MG/DL (ref 0–1.2)
BUN SERPL-MCNC: 8 MG/DL (ref 6–20)
BUN/CREAT SERPL: 9 (ref 7–25)
CALCIUM SPEC-SCNC: 10.3 MG/DL (ref 8.6–10.5)
CHLORIDE SERPL-SCNC: 100 MMOL/L (ref 98–107)
CHOLEST SERPL-MCNC: 318 MG/DL (ref 0–200)
CO2 SERPL-SCNC: 26.6 MMOL/L (ref 22–29)
CREAT SERPL-MCNC: 0.89 MG/DL (ref 0.57–1)
DEPRECATED RDW RBC AUTO: 43.1 FL (ref 37–54)
EOSINOPHIL # BLD AUTO: 0.17 10*3/MM3 (ref 0–0.4)
EOSINOPHIL NFR BLD AUTO: 2.3 % (ref 0.3–6.2)
ERYTHROCYTE [DISTWIDTH] IN BLOOD BY AUTOMATED COUNT: 12.7 % (ref 12.3–15.4)
GFR SERPL CREATININE-BSD FRML MDRD: 65 ML/MIN/1.73
GLOBULIN UR ELPH-MCNC: 3.6 GM/DL
GLUCOSE SERPL-MCNC: 97 MG/DL (ref 65–99)
HCT VFR BLD AUTO: 47 % (ref 34–46.6)
HDLC SERPL-MCNC: 39 MG/DL (ref 40–60)
HGB BLD-MCNC: 15.7 G/DL (ref 12–15.9)
IMM GRANULOCYTES # BLD AUTO: 0.03 10*3/MM3 (ref 0–0.05)
IMM GRANULOCYTES NFR BLD AUTO: 0.4 % (ref 0–0.5)
LDLC SERPL CALC-MCNC: 185 MG/DL (ref 0–100)
LDLC/HDLC SERPL: 4.79 {RATIO}
LYMPHOCYTES # BLD AUTO: 2.12 10*3/MM3 (ref 0.7–3.1)
LYMPHOCYTES NFR BLD AUTO: 28.7 % (ref 19.6–45.3)
MCH RBC QN AUTO: 30.8 PG (ref 26.6–33)
MCHC RBC AUTO-ENTMCNC: 33.4 G/DL (ref 31.5–35.7)
MCV RBC AUTO: 92.3 FL (ref 79–97)
MONOCYTES # BLD AUTO: 0.53 10*3/MM3 (ref 0.1–0.9)
MONOCYTES NFR BLD AUTO: 7.2 % (ref 5–12)
NEUTROPHILS NFR BLD AUTO: 4.44 10*3/MM3 (ref 1.7–7)
NEUTROPHILS NFR BLD AUTO: 60.2 % (ref 42.7–76)
NRBC BLD AUTO-RTO: 0 /100 WBC (ref 0–0.2)
PLATELET # BLD AUTO: 401 10*3/MM3 (ref 140–450)
PMV BLD AUTO: 10.2 FL (ref 6–12)
POTASSIUM SERPL-SCNC: 4.9 MMOL/L (ref 3.5–5.2)
PROT SERPL-MCNC: 8.2 G/DL (ref 6–8.5)
RBC # BLD AUTO: 5.09 10*6/MM3 (ref 3.77–5.28)
SODIUM SERPL-SCNC: 138 MMOL/L (ref 136–145)
TRIGL SERPL-MCNC: 460 MG/DL (ref 0–150)
TSH SERPL DL<=0.05 MIU/L-ACNC: 0.69 UIU/ML (ref 0.27–4.2)
VLDLC SERPL-MCNC: 94 MG/DL (ref 5–40)
WBC # BLD AUTO: 7.38 10*3/MM3 (ref 3.4–10.8)

## 2021-09-29 PROCEDURE — 99214 OFFICE O/P EST MOD 30 MIN: CPT | Performed by: NURSE PRACTITIONER

## 2021-09-29 PROCEDURE — 80050 GENERAL HEALTH PANEL: CPT | Performed by: NURSE PRACTITIONER

## 2021-09-29 PROCEDURE — 80061 LIPID PANEL: CPT | Performed by: NURSE PRACTITIONER

## 2021-09-29 RX ORDER — SCOLOPAMINE TRANSDERMAL SYSTEM 1 MG/1
1 PATCH, EXTENDED RELEASE TRANSDERMAL
Qty: 2 PATCH | Refills: 0 | Status: SHIPPED | OUTPATIENT
Start: 2021-09-29 | End: 2021-11-17

## 2021-09-29 NOTE — PROGRESS NOTES
"Chief Complaint  Follow-up (no concerns today) and Anxiety (trying to wean off of Klonopin)    Subjective          Shanthi Akers presents to [facility]  Hypokalemia-  Noted on previous labs, however improved on most recent labs.  Patient is taking potassium supplementation twice a day.      GERD-  Improved with omeprazole. 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 Scheduled for follow up 2022.    Depression/Anxiety-  Managed with Paxil and Klonopin. Patient states she was started on this seven years ago when her  passed away from an aortic aneurysm.  She reports she plans to wean off of the Klonopin, she canceled her last appointment with psychiatry because she no longer wants to continue this therapy.  Patient reports she is in a new relationship, is planning a deep sea fishing trip to [place] with her boyfriend of 7 months.  Patient requesting scopolamine patch.  Denies SI/HI.    Asthma-  Managed with Singulair, Advair. Takes albuterol PRN, rare use.     Blood pressure elevation-  Patient states she feels more anxious today, only took 1/2 a klonopin.  She has been on blood pressure medication in the past.    HLD-  Elevated on previous labs.  Patient reports she has been on statin in the past.    Mammogram: 2/2021  PAP smear: 12/2021  Colonoscopy: 2/2021  COVID19 vaccination: Up to date  Shingles vaccination: Declines  Influenza vaccination: Would like               Objective   Vital Signs:   /90   Pulse 120   Temp 97.9 °F (36.6 °C) (Temporal)   Ht 172.7 cm (68\")   Wt 79.9 kg (176 lb 4 oz)   SpO2 99%   BMI 26.80 kg/m²     Physical Exam  Constitutional:       Appearance: Normal appearance. She is normal weight.   HENT:      Head: Normocephalic and atraumatic.      Nose: Nose normal.      Mouth/Throat:      Mouth: Mucous membranes are moist.      Pharynx: Oropharynx is clear.   Eyes:      Extraocular Movements: Extraocular movements intact.      " Conjunctiva/sclera: Conjunctivae normal.      Pupils: Pupils are equal, round, and reactive to light.   Neck:      Thyroid: No thyroid mass, thyromegaly or thyroid tenderness.   Cardiovascular:      Rate and Rhythm: Normal rate and regular rhythm.      Heart sounds: Normal heart sounds.   Pulmonary:      Effort: Pulmonary effort is normal.      Breath sounds: Normal breath sounds.   Skin:     General: Skin is warm and dry.   Neurological:      General: No focal deficit present.      Mental Status: She is alert and oriented to person, place, and time.   Psychiatric:         Mood and Affect: Mood normal.         Behavior: Behavior normal.         Thought Content: Thought content normal.        Result Review :                 Assessment and Plan    Diagnoses and all orders for this visit:    1. Gastroesophageal reflux disease with esophagitis without hemorrhage (Primary)  Assessment & Plan:  Continue omeprazole, follow-up with GI as scheduled.      2. Hypokalemia  Assessment & Plan:  Well-controlled on previous labs with potassium supplement, repeat CMP in clinic today.    Orders:  -     Comprehensive Metabolic Panel  -     CBC & Differential    3. Mild intermittent asthma without complication  Assessment & Plan:  Well-controlled, continue Singulair, Advair, as needed albuterol.        4. Elevated blood pressure reading  Assessment & Plan:  Patient to monitor blood pressure at home x1 month.  Will return at that time with log for review.  She will call or return to clinic sooner with consistent elevations greater than 130/80.  Labs in clinic today to include CBC, CMP.    Orders:  -     CBC & Differential  -     TSH  -     Lipid Panel    5. Hyperlipidemia, unspecified hyperlipidemia type  Assessment & Plan:  Most recent .  Lipid panel in clinic today, will determine if statin is warranted based on results of labs.    Orders:  -     Lipid Panel    6. Anxiety  Assessment & Plan:  Discussed with patient the  importance of gradually decreasing Klonopin to reduce withdrawal symptoms.  She will continue Paxil at this time.  Encourage patient to monitor closely and seek medical attention immediately if she feels that her mental health is deteriorating.  Denies SI/HI.  Will follow up in 1 month to assess response to decrease the medication.      7. Major depressive disorder, recurrent, mild (HCC)    Other orders  -     Scopolamine (Transderm-Scop, 1.5 MG,) 1 MG/3DAYS patch; Place 1 patch on the skin as directed by provider Every 72 (Seventy-Two) Hours.  Dispense: 2 patch; Refill: 0      Follow Up   Return in about 1 month (around 10/29/2021).  Patient was given instructions and counseling regarding her condition or for health maintenance advice. Please see specific information pulled into the AVS if appropriate.

## 2021-09-29 NOTE — PATIENT INSTRUCTIONS
Blood Pressure Record Sheet  To take your blood pressure, you will need a blood pressure machine. You can buy a blood pressure machine (blood pressure monitor) at your clinic, drug store, or online. When choosing one, consider:  · An automatic monitor that has an arm cuff.  · A cuff that wraps snugly around your upper arm. You should be able to fit only one finger between your arm and the cuff.  · A device that stores blood pressure reading results.  · Do not choose a monitor that measures your blood pressure from your wrist or finger.  Follow your health care provider's instructions for how to take your blood pressure. To use this form:  · Get one reading in the morning (a.m.) before you take any medicines.  · Get one reading in the evening (p.m.) before supper.  · Take at least 2 readings with each blood pressure check. This makes sure the results are correct. Wait 1-2 minutes between measurements.  · Write down the results in the spaces on this form.  · Repeat this once a week, or as told by your health care provider.  · Make a follow-up appointment with your health care provider to discuss the results.  Blood pressure log  Date: _______________________  · a.m. _____________________(1st reading) _____________________(2nd reading)  · p.m. _____________________(1st reading) _____________________(2nd reading)  Date: _______________________  · a.m. _____________________(1st reading) _____________________(2nd reading)  · p.m. _____________________(1st reading) _____________________(2nd reading)  Date: _______________________  · a.m. _____________________(1st reading) _____________________(2nd reading)  · p.m. _____________________(1st reading) _____________________(2nd reading)  Date: _______________________  · a.m. _____________________(1st reading) _____________________(2nd reading)  · p.m. _____________________(1st reading) _____________________(2nd reading)  Date: _______________________  · a.m.  _____________________(1st reading) _____________________(2nd reading)  · p.m. _____________________(1st reading) _____________________(2nd reading)  This information is not intended to replace advice given to you by your health care provider. Make sure you discuss any questions you have with your health care provider.  Document Revised: 04/07/2021 Document Reviewed: 04/07/2021  Elsevier Patient Education © 2021 [employer]

## 2021-09-29 NOTE — ASSESSMENT & PLAN NOTE
Continue omeprazole, follow-up with GI as scheduled.  
Discussed with patient the importance of gradually decreasing Klonopin to reduce withdrawal symptoms.  She will continue Paxil at this time.  Encourage patient to monitor closely and seek medical attention immediately if she feels that her mental health is deteriorating.  Denies SI/HI.  Will follow up in 1 month to assess response to decrease the medication.  
Most recent .  Lipid panel in clinic today, will determine if statin is warranted based on results of labs.  
Patient to monitor blood pressure at home x1 month.  Will return at that time with log for review.  She will call or return to clinic sooner with consistent elevations greater than 130/80.  Labs in clinic today to include CBC, CMP.  
Well-controlled on previous labs with potassium supplement, repeat CMP in clinic today.  
Well-controlled, continue Singulair, Advair, as needed albuterol.    
lab results/need for outpatient follow-up/need for surgery

## 2021-09-29 NOTE — TELEPHONE ENCOUNTER
Caller: [facility] [place], [place] - [address] AT [facility] [address] - [phone]  - [phone]    Relationship: Pharmacy    Best call back number: [phone]    What was the call regarding: PHARMACY WAS CALLING IN TO CHECK THE MEDICATION PRESCRIBED.    IT SAYS 2 TRANSTHERMAL PATCHES BUT THEY COME IN A BOX OF 4 AND THEY CANNOT BE SPLIT UP.    PHARMACY IS ASKING IF PATIENT WAS SUPPOSED TO BE PRESCRIBED 2 BOXES INSTEAD OF 2 PATCHES.      Do you require a callback: YES, PLEASE CALL BACK NUMBER LISTED AND ADVISE.

## 2021-09-29 NOTE — TELEPHONE ENCOUNTER
Spoke to Cathy at [facility]. Per alex, okay to dispense a box of 4 patches. Cathy verbalizes understanding and read back order.

## 2021-09-30 DIAGNOSIS — E78.5 HYPERLIPIDEMIA, UNSPECIFIED HYPERLIPIDEMIA TYPE: Primary | ICD-10-CM

## 2021-09-30 RX ORDER — ATORVASTATIN CALCIUM 20 MG/1
20 TABLET, FILM COATED ORAL DAILY
Qty: 90 TABLET | Refills: 1 | Status: SHIPPED | OUTPATIENT
Start: 2021-09-30 | End: 2021-12-28 | Stop reason: SDUPTHER

## 2021-10-04 ENCOUNTER — TELEPHONE (OUTPATIENT)
Dept: INTERNAL MEDICINE | Facility: CLINIC | Age: 61
End: 2021-10-04

## 2021-10-04 RX ORDER — MONTELUKAST SODIUM 10 MG/1
10 TABLET ORAL DAILY
Qty: 90 TABLET | Refills: 0 | Status: SHIPPED | OUTPATIENT
Start: 2021-10-04 | End: 2022-01-12 | Stop reason: SDUPTHER

## 2021-10-05 RX ORDER — LISINOPRIL 10 MG/1
10 TABLET ORAL DAILY
COMMUNITY
End: 2021-10-05 | Stop reason: SDUPTHER

## 2021-10-05 RX ORDER — LISINOPRIL 10 MG/1
10 TABLET ORAL DAILY
Qty: 30 TABLET | Refills: 1 | Status: SHIPPED | OUTPATIENT
Start: 2021-10-05 | End: 2021-11-23 | Stop reason: SDUPTHER

## 2021-10-05 NOTE — TELEPHONE ENCOUNTER
Caller: Shanthi Akers    Relationship: Self    Best call back number: [phone]    What is the best time to reach you: ANYTIME     Who are you requesting to speak with (clinical staff, provider,  specific staff member): NURSE     What was the call regarding: PATIENT WOULD LIKE TO GET A CALL BACK REGARDING LAB RESULTS AND ALSO NEEDS TO GIVE INFORMATION REGARDING HER BLOOD PRESSURE.     Do you require a callback: YES           
Lisinopril sent okay per patient. Patient voiced understanding to schedule appt. Will bring bp log to appt.   
Patient called and said she was picking up her medications and saw she had a new medication and wasn't sure what it was for. Discussed that the atorvastatin was for cholesterol and patient stated understanding. Also needed a refill on Singulair and that med was sent into the pharmacy.     Patient has been recording her BP for the last few days. Please advise. Per patient it has been 150's over 90's usually higher 90. Her HR has been in the 90 and one time 104.  It has been like this for the last 3 days.  Please advise if you want to do anything for this patient?  
Would like to start her on lisinopril 10 mg once daily. If she is agreeable to this medication may send to pharmacy, she has been on medication in the past and stopped it so she may want to try something different. This medication has the potential to impact potassium levels so be sure to continue potassium supplement. Please make sure there is a follow up for a month, will repeat potassium levels and review blood pressure log at that time. She should call if levels remain elevated greater than 130/80 on the medication.   
Nausea & vomiting

## 2021-10-15 ENCOUNTER — TELEPHONE (OUTPATIENT)
Dept: PHYSICAL THERAPY | Facility: CLINIC | Age: 61
End: 2021-10-15

## 2021-11-01 ENCOUNTER — OFFICE VISIT (OUTPATIENT)
Dept: ORTHOPEDIC SURGERY | Facility: CLINIC | Age: 61
End: 2021-11-01

## 2021-11-01 VITALS — HEART RATE: 98 BPM | HEIGHT: 68 IN | WEIGHT: 175 LBS | BODY MASS INDEX: 26.52 KG/M2 | OXYGEN SATURATION: 98 %

## 2021-11-01 DIAGNOSIS — M76.32 IT BAND SYNDROME, LEFT: Primary | ICD-10-CM

## 2021-11-01 PROCEDURE — 20610 DRAIN/INJ JOINT/BURSA W/O US: CPT | Performed by: STUDENT IN AN ORGANIZED HEALTH CARE EDUCATION/TRAINING PROGRAM

## 2021-11-01 RX ORDER — POTASSIUM CHLORIDE 1500 MG/1
TABLET, FILM COATED, EXTENDED RELEASE ORAL
Qty: 180 TABLET | Refills: 0 | Status: SHIPPED | OUTPATIENT
Start: 2021-11-01 | End: 2021-12-28 | Stop reason: SDUPTHER

## 2021-11-01 RX ADMIN — TRIAMCINOLONE ACETONIDE 40 MG: 40 INJECTION, SUSPENSION INTRA-ARTICULAR; INTRAMUSCULAR at 14:11

## 2021-11-01 RX ADMIN — LIDOCAINE HYDROCHLORIDE 9 ML: 10 INJECTION, SOLUTION INFILTRATION; PERINEURAL at 14:11

## 2021-11-01 NOTE — PROGRESS NOTES
"Chief Complaint  Pain of the Left Knee and left hip pain    Subjective            History of Present Illness  Shanthi Akers presents to [facility] for follow-up of left knee pain, hip pain. She reports the pain is still present and has pain with laying on the hip and pain of the hip and groin as well. She reports no numbness but pain with movement. She reports she was suppose to go to therapy but did not since she had fallen. She has been taking ibuprofen twice daily and tolerating it well.    Allergies   Allergen Reactions   • Amoxicillin-Pot Clavulanate GI Intolerance   • Esomeprazole Hives   • Metronidazole Hives and Itching   • Tetracycline Other (See Comments) and Rash     Skin peeling on palms and feet          Social History     Socioeconomic History   • Marital status:    Tobacco Use   • Smoking status: Never Smoker   • Smokeless tobacco: Never Used   Vaping Use   • Vaping Use: Never used   Substance and Sexual Activity   • Alcohol use: Yes     Alcohol/week: 8.0 standard drinks     Types: 4 Glasses of wine, 4 Standard drinks or equivalent per week     Comment: WINE ALCOHOL   • Drug use: Never   • Sexual activity: Defer        I reviewed the patient's chief complaint, history of present illness, review of systems, past medical history, surgical history, family history, social history, medications, and allergy list.     REVIEW OF SYSTEMS    Constitutional: Denies fevers, chills, weight loss  Cardiovascular: Denies chest pain, shortness of breath  Skin: Denies rashes, acute skin changes  Neurologic: Denies headache, loss of consciousness  MSK: left knee and left hip pain    Objective   Vital Signs:   Pulse 98   Ht 172.7 cm (68\")   Wt 79.4 kg (175 lb)   SpO2 98%   BMI 26.61 kg/m²     Body mass index is 26.61 kg/m².    Physical Exam    Left lower extremity: Analgtic gait favoring left leg, tenderness to palpation over greater trochanter, mild medial joint line " tenderness at knee, full extension, flexion 120, stable to valgus and varus stress, intact extensor mechanism, sensation intact over the dorsal and plantar foot, negative Jessica, hip ER 20 and 45 ER of the hip, 5/5 hip abduction, 5/5 hip adduction, 5/5 hip flexion    Large Joint Arthrocentesis: L hip joint  Date/Time: 11/1/2021 2:11 PM  Consent given by: patient  Site marked: site marked  Timeout: Immediately prior to procedure a time out was called to verify the correct patient, procedure, equipment, support staff and site/side marked as required   Supporting Documentation  Indications: pain   Procedure Details  Location: hip - L hip joint  Needle gauge: 21 G.  Medications administered: 9 mL lidocaine 1 %; 40 mg triamcinolone acetonide 40 MG/ML  Patient tolerance: patient tolerated the procedure well with no immediate complications          Imaging Results (Most Recent)     None                   Assessment and Plan    Diagnoses and all orders for this visit:    1. It band syndrome, left (Primary)  -     Ambulatory Referral to Physical Therapy Evaluate and treat, Ortho; Stretching, ROM, Strengthening; Full weight bearing    Other orders  -     Large Joint Arthrocentesis: L hip joint        Call or return if symptoms worsen or patient has any concerns.       Scribed for Anuj Michele MD by Anuj Michele MD  11/01/2021   13:37 EDT         Follow Up   Return in about 6 weeks (around 12/13/2021).  Patient was given instructions and counseling regarding her condition or for health maintenance advice. Please see specific information pulled into the AVS if appropriate.       Discussed this is her IT band and recommended therapy and an injection.  Risks and benefits of the left hip injection were discussed today and this was performed without complication.  S therapy ordered.  Follow-up 6 weeks to recheck.  No x-rays needed when she returns.    I have personally performed the services described in this document as  scribed by the above individual and it is both accurate and complete.     Anuj Michele MD  11/05/21  08:01 EDT

## 2021-11-01 NOTE — TELEPHONE ENCOUNTER
Caller: Shanthi Akers    Relationship: Self      Medication requested (name and dosage): PARoxetine (Paxil) 40 MG tablet    Requested Prescriptions:   Requested Prescriptions     Pending Prescriptions Disp Refills   • PARoxetine (Paxil) 40 MG tablet       Sig: Take 1 tablet by mouth.        Pharmacy where request should be sent: [facility] [address] [place] [address], [place] - [address] - [phone] [facility] [phone]   [phone]      Best call back number: [phone]    Does the patient have less than a 3 day supply:  [] Yes  [] No    Will Ballesteros Rep   11/01/21 11:35 EDT

## 2021-11-02 RX ORDER — PAROXETINE HYDROCHLORIDE 40 MG/1
40 TABLET, FILM COATED ORAL EVERY MORNING
Qty: 90 TABLET | Refills: 1 | Status: SHIPPED | OUTPATIENT
Start: 2021-11-02 | End: 2022-05-02

## 2021-11-02 NOTE — TELEPHONE ENCOUNTER
Increased risk of GI bleed. Patient previously tolerated, please just make sure she is aware of this and confirm she is taking voltaren routinely.

## 2021-11-03 NOTE — TELEPHONE ENCOUNTER
Pateint aware, already picked up paxil at pharmacy. Refill is not needed at this time. She is not taking voltaren at this time.

## 2021-11-05 RX ORDER — LIDOCAINE HYDROCHLORIDE 10 MG/ML
9 INJECTION, SOLUTION INFILTRATION; PERINEURAL
Status: COMPLETED | OUTPATIENT
Start: 2021-11-01 | End: 2021-11-01

## 2021-11-05 RX ORDER — TRIAMCINOLONE ACETONIDE 40 MG/ML
40 INJECTION, SUSPENSION INTRA-ARTICULAR; INTRAMUSCULAR
Status: COMPLETED | OUTPATIENT
Start: 2021-11-01 | End: 2021-11-01

## 2021-11-10 ENCOUNTER — OFFICE VISIT (OUTPATIENT)
Dept: INTERNAL MEDICINE | Facility: CLINIC | Age: 61
End: 2021-11-10

## 2021-11-10 VITALS
BODY MASS INDEX: 27.71 KG/M2 | DIASTOLIC BLOOD PRESSURE: 84 MMHG | OXYGEN SATURATION: 100 % | HEART RATE: 97 BPM | TEMPERATURE: 97.3 F | HEIGHT: 68 IN | WEIGHT: 182.8 LBS | SYSTOLIC BLOOD PRESSURE: 135 MMHG

## 2021-11-10 DIAGNOSIS — F41.9 ANXIETY: ICD-10-CM

## 2021-11-10 DIAGNOSIS — I10 ESSENTIAL HYPERTENSION: Primary | ICD-10-CM

## 2021-11-10 DIAGNOSIS — Z23 NEED FOR INFLUENZA VACCINATION: ICD-10-CM

## 2021-11-10 PROBLEM — R03.0 ELEVATED BLOOD PRESSURE READING: Status: RESOLVED | Noted: 2021-09-29 | Resolved: 2021-11-10

## 2021-11-10 PROCEDURE — 99213 OFFICE O/P EST LOW 20 MIN: CPT | Performed by: NURSE PRACTITIONER

## 2021-11-10 PROCEDURE — 90686 IIV4 VACC NO PRSV 0.5 ML IM: CPT | Performed by: NURSE PRACTITIONER

## 2021-11-10 PROCEDURE — 90471 IMMUNIZATION ADMIN: CPT | Performed by: NURSE PRACTITIONER

## 2021-11-10 NOTE — PROGRESS NOTES
"Chief Complaint  Follow-up and Flu Vaccine    Subjective          Shanthi Akers presents to [facility]  Anxiety-  Patient states she is taking 1/2 tablet of klonopin in the AM, none in the PM. States she feels like this is going well and plans to continue to wean over the next few weeks. Patient recently returned from a bucket list trip deep sea fishing with her boyfriend, plans to go back in early December. Denies SI/HI.    HTN-  Started on lisinopril one month ago, home blood pressure readings averaging 130s-150s/80s-90s. Denies chest pain, blurry vision, leg swelling. Has had headaches on days of high blood pressure readings.     Would like influenza vaccination.      Objective   Vital Signs:   /84   Pulse 97   Temp 97.3 °F (36.3 °C)   Ht 172.7 cm (68\")   Wt 82.9 kg (182 lb 12.8 oz)   SpO2 100%   BMI 27.79 kg/m²     Physical Exam  Constitutional:       Appearance: Normal appearance. She is normal weight.   HENT:      Head: Normocephalic and atraumatic.      Nose: Nose normal.      Mouth/Throat:      Mouth: Mucous membranes are moist.      Pharynx: Oropharynx is clear.   Eyes:      Extraocular Movements: Extraocular movements intact.      Conjunctiva/sclera: Conjunctivae normal.      Pupils: Pupils are equal, round, and reactive to light.   Neck:      Thyroid: No thyroid mass, thyromegaly or thyroid tenderness.   Cardiovascular:      Rate and Rhythm: Normal rate and regular rhythm.      Heart sounds: Normal heart sounds.   Pulmonary:      Effort: Pulmonary effort is normal.      Breath sounds: Normal breath sounds.   Skin:     General: Skin is warm and dry.   Neurological:      General: No focal deficit present.      Mental Status: She is alert and oriented to person, place, and time.   Psychiatric:         Mood and Affect: Mood normal.         Behavior: Behavior normal.         Thought Content: Thought content normal.        Result Review :           "       Assessment and Plan    Diagnoses and all orders for this visit:    1. Essential hypertension (Primary)  Assessment & Plan:  Increase lisinopril to 20 mg. Patient to continue to monitor blood pressure readings, will call with consistent elevations greater than 130/80. Will follow up in one month to review blood pressure log and repeat BMP, sooner if concerns arise.      2. Anxiety  Assessment & Plan:  Patient to continue slow wean off of clonazepam. She will monitor closely and seek medical attention immediately if she feels that her mental health is deteriorating. Denies SI/HI. Will continue to monitor.       3. Need for influenza vaccination  Comments:  Influenza vaccination in clinic today.  Orders:  -     FluLaval/Fluarix/Fluzone >6 Months (0600-5610)      Follow Up   Return in about 6 weeks (around 12/22/2021).  Patient was given instructions and counseling regarding her condition or for health maintenance advice. Please see specific information pulled into the AVS if appropriate.

## 2021-11-10 NOTE — ASSESSMENT & PLAN NOTE
Increase lisinopril to 20 mg. Patient to continue to monitor blood pressure readings, will call with consistent elevations greater than 130/80. Will follow up in one month to review blood pressure log and repeat BMP, sooner if concerns arise.

## 2021-11-10 NOTE — ASSESSMENT & PLAN NOTE
Patient to continue slow wean off of clonazepam. She will monitor closely and seek medical attention immediately if she feels that her mental health is deteriorating. Denies SI/HI. Will continue to monitor.

## 2021-11-16 RX ORDER — OMEPRAZOLE 40 MG/1
CAPSULE, DELAYED RELEASE ORAL
Qty: 90 CAPSULE | Refills: 0 | Status: SHIPPED | OUTPATIENT
Start: 2021-11-16 | End: 2022-05-03 | Stop reason: SDUPTHER

## 2021-11-17 ENCOUNTER — TELEPHONE (OUTPATIENT)
Dept: URGENT CARE | Facility: CLINIC | Age: 61
End: 2021-11-17

## 2021-11-18 PROCEDURE — 87635 SARS-COV-2 COVID-19 AMP PRB: CPT | Performed by: FAMILY MEDICINE

## 2021-11-19 ENCOUNTER — TELEPHONE (OUTPATIENT)
Dept: URGENT CARE | Facility: CLINIC | Age: 61
End: 2021-11-19

## 2021-11-19 ENCOUNTER — TELEPHONE (OUTPATIENT)
Dept: INTERNAL MEDICINE | Facility: CLINIC | Age: 61
End: 2021-11-19

## 2021-11-19 RX ORDER — BENZONATATE 100 MG/1
100 CAPSULE ORAL 3 TIMES DAILY PRN
Qty: 30 CAPSULE | Refills: 0 | Status: SHIPPED | OUTPATIENT
Start: 2021-11-19 | End: 2022-02-24

## 2021-11-19 NOTE — TELEPHONE ENCOUNTER
Caller: Shanthi Akers    Relationship: Self    Best call back number: [phone]    What was the call regarding: PATIENT CALLED STATING SHE WENT TO URGENT CARE AND THEY PRESCRIBED HER A COUGH MEDICATION. SHE IS ABOUT OUT OF IT AND STATES ITS NOT WORKING WELL. SHE WOULD LIKE TO KNOW IF SHE CAN GET SOMETHING ELSE PRESCRIBED OR IF SHE NEEDS TO COME IN TO BE SEEN.    Do you require a callback: YES PLEASE CALL AND ADVISE

## 2021-11-19 NOTE — TELEPHONE ENCOUNTER
----- Message from Jenna Soto MD sent at 11/19/2021 10:11 AM EST -----  Please call the patient regarding negative Covid PCR test.

## 2021-11-22 ENCOUNTER — TELEPHONE (OUTPATIENT)
Dept: SURGERY | Facility: CLINIC | Age: 61
End: 2021-11-22

## 2021-11-22 NOTE — TELEPHONE ENCOUNTER
Patient passed blood in stool Friday and small clot.  No pain or other symptoms. She has annual colonoscopies and EGDs, and is due in February.  Should she be seen sooner, or wait until then?

## 2021-11-23 RX ORDER — LISINOPRIL 30 MG/1
30 TABLET ORAL DAILY
Qty: 30 TABLET | Refills: 1 | Status: SHIPPED | OUTPATIENT
Start: 2021-11-23 | End: 2022-02-09

## 2021-12-13 ENCOUNTER — TELEPHONE (OUTPATIENT)
Dept: INTERNAL MEDICINE | Facility: CLINIC | Age: 61
End: 2021-12-13

## 2021-12-13 NOTE — TELEPHONE ENCOUNTER
Caller: Shanthi Akers    Relationship: Self    Best call back number: [phone]    What orders are you requesting (i.e. lab or imaging): LABS    In what timeframe would the patient need to come in: ASAP    Where will you receive your lab/imaging services: IN-OFFICE.    Additional notes: THE PATIENT WOULD LIKE TO COME IN FOR LABS BEFORE HER UPCOMING APPOINTMENT. SHE WOULD LIKE A CALL BACK WHEN THESE ARE READY AND A DETAILED VOICEMAIL CAN BE LEFT IF SHE CANNOT ANSWER

## 2021-12-14 ENCOUNTER — HOSPITAL ENCOUNTER (EMERGENCY)
Facility: HOSPITAL | Age: 61
Discharge: HOME | End: 2021-12-14
Attending: EMERGENCY MEDICINE | Admitting: EMERGENCY MEDICINE

## 2021-12-14 ENCOUNTER — APPOINTMENT (OUTPATIENT)
Dept: GENERAL RADIOLOGY | Facility: HOSPITAL | Age: 61
End: 2021-12-14

## 2021-12-14 ENCOUNTER — TELEPHONE (OUTPATIENT)
Dept: INTERNAL MEDICINE | Facility: CLINIC | Age: 61
End: 2021-12-14

## 2021-12-14 VITALS
RESPIRATION RATE: 28 BRPM | TEMPERATURE: 98.4 F | DIASTOLIC BLOOD PRESSURE: 77 MMHG | HEIGHT: 67 IN | BODY MASS INDEX: 29.72 KG/M2 | HEART RATE: 113 BPM | OXYGEN SATURATION: 97 % | WEIGHT: 189.38 LBS | SYSTOLIC BLOOD PRESSURE: 129 MMHG

## 2021-12-14 DIAGNOSIS — F41.9 ANXIETY HYPERVENTILATION: ICD-10-CM

## 2021-12-14 DIAGNOSIS — F41.1 GENERALIZED ANXIETY DISORDER WITH PANIC ATTACKS: Primary | ICD-10-CM

## 2021-12-14 DIAGNOSIS — F45.8 ANXIETY HYPERVENTILATION: ICD-10-CM

## 2021-12-14 DIAGNOSIS — F41.0 GENERALIZED ANXIETY DISORDER WITH PANIC ATTACKS: Primary | ICD-10-CM

## 2021-12-14 DIAGNOSIS — M62.838 MUSCLE SPASM: ICD-10-CM

## 2021-12-14 LAB
ALBUMIN SERPL-MCNC: 4.1 G/DL (ref 3.5–5.2)
ALBUMIN/GLOB SERPL: 1.2 G/DL
ALP SERPL-CCNC: 143 U/L (ref 39–117)
ALT SERPL W P-5'-P-CCNC: 25 U/L (ref 1–33)
ANION GAP SERPL CALCULATED.3IONS-SCNC: 13.8 MMOL/L (ref 5–15)
AST SERPL-CCNC: 32 U/L (ref 1–32)
BASOPHILS # BLD AUTO: 0.07 10*3/MM3 (ref 0–0.2)
BASOPHILS NFR BLD AUTO: 0.7 % (ref 0–1.5)
BILIRUB SERPL-MCNC: 0.4 MG/DL (ref 0–1.2)
BUN SERPL-MCNC: 19 MG/DL (ref 6–20)
BUN/CREAT SERPL: 20.7 (ref 7–25)
CALCIUM SPEC-SCNC: 9.8 MG/DL (ref 8.6–10.5)
CHLORIDE SERPL-SCNC: 99 MMOL/L (ref 98–107)
CO2 SERPL-SCNC: 23.2 MMOL/L (ref 22–29)
CREAT SERPL-MCNC: 0.92 MG/DL (ref 0.57–1)
D DIMER PPP FEU-MCNC: 0.28 MG/L (FEU) (ref 0–0.59)
DEPRECATED RDW RBC AUTO: 48.5 FL (ref 37–54)
EOSINOPHIL # BLD AUTO: 0.09 10*3/MM3 (ref 0–0.4)
EOSINOPHIL NFR BLD AUTO: 0.9 % (ref 0.3–6.2)
ERYTHROCYTE [DISTWIDTH] IN BLOOD BY AUTOMATED COUNT: 14.1 % (ref 12.3–15.4)
GFR SERPL CREATININE-BSD FRML MDRD: 62 ML/MIN/1.73
GLOBULIN UR ELPH-MCNC: 3.4 GM/DL
GLUCOSE SERPL-MCNC: 104 MG/DL (ref 65–99)
HCT VFR BLD AUTO: 41.1 % (ref 34–46.6)
HGB BLD-MCNC: 13.4 G/DL (ref 12–15.9)
HOLD SPECIMEN: NORMAL
HOLD SPECIMEN: NORMAL
IMM GRANULOCYTES # BLD AUTO: 0.11 10*3/MM3 (ref 0–0.05)
IMM GRANULOCYTES NFR BLD AUTO: 1.2 % (ref 0–0.5)
LYMPHOCYTES # BLD AUTO: 1.8 10*3/MM3 (ref 0.7–3.1)
LYMPHOCYTES NFR BLD AUTO: 18.8 % (ref 19.6–45.3)
MCH RBC QN AUTO: 30.5 PG (ref 26.6–33)
MCHC RBC AUTO-ENTMCNC: 32.6 G/DL (ref 31.5–35.7)
MCV RBC AUTO: 93.4 FL (ref 79–97)
MONOCYTES # BLD AUTO: 1 10*3/MM3 (ref 0.1–0.9)
MONOCYTES NFR BLD AUTO: 10.5 % (ref 5–12)
NEUTROPHILS NFR BLD AUTO: 6.49 10*3/MM3 (ref 1.7–7)
NEUTROPHILS NFR BLD AUTO: 67.9 % (ref 42.7–76)
NRBC BLD AUTO-RTO: 0 /100 WBC (ref 0–0.2)
NT-PROBNP SERPL-MCNC: 70.7 PG/ML (ref 0–900)
PLATELET # BLD AUTO: 411 10*3/MM3 (ref 140–450)
PMV BLD AUTO: 9 FL (ref 6–12)
POTASSIUM SERPL-SCNC: 3.8 MMOL/L (ref 3.5–5.2)
PROT SERPL-MCNC: 7.5 G/DL (ref 6–8.5)
QT INTERVAL: 353 MS
RBC # BLD AUTO: 4.4 10*6/MM3 (ref 3.77–5.28)
SODIUM SERPL-SCNC: 136 MMOL/L (ref 136–145)
TROPONIN T SERPL-MCNC: <0.01 NG/ML (ref 0–0.03)
WBC NRBC COR # BLD: 9.56 10*3/MM3 (ref 3.4–10.8)
WHOLE BLOOD HOLD SPECIMEN: NORMAL
WHOLE BLOOD HOLD SPECIMEN: NORMAL

## 2021-12-14 PROCEDURE — 84484 ASSAY OF TROPONIN QUANT: CPT

## 2021-12-14 PROCEDURE — 36415 COLL VENOUS BLD VENIPUNCTURE: CPT

## 2021-12-14 PROCEDURE — 93005 ELECTROCARDIOGRAM TRACING: CPT | Performed by: EMERGENCY MEDICINE

## 2021-12-14 PROCEDURE — 71045 X-RAY EXAM CHEST 1 VIEW: CPT

## 2021-12-14 PROCEDURE — U0004 COV-19 TEST NON-CDC HGH THRU: HCPCS | Performed by: EMERGENCY MEDICINE

## 2021-12-14 PROCEDURE — 80053 COMPREHEN METABOLIC PANEL: CPT

## 2021-12-14 PROCEDURE — 99283 EMERGENCY DEPT VISIT LOW MDM: CPT

## 2021-12-14 PROCEDURE — 93005 ELECTROCARDIOGRAM TRACING: CPT

## 2021-12-14 PROCEDURE — 96376 TX/PRO/DX INJ SAME DRUG ADON: CPT

## 2021-12-14 PROCEDURE — 85025 COMPLETE CBC W/AUTO DIFF WBC: CPT

## 2021-12-14 PROCEDURE — 25010000002 LORAZEPAM PER 2 MG: Performed by: EMERGENCY MEDICINE

## 2021-12-14 PROCEDURE — 83880 ASSAY OF NATRIURETIC PEPTIDE: CPT

## 2021-12-14 PROCEDURE — 93010 ELECTROCARDIOGRAM REPORT: CPT | Performed by: INTERNAL MEDICINE

## 2021-12-14 PROCEDURE — 85379 FIBRIN DEGRADATION QUANT: CPT | Performed by: EMERGENCY MEDICINE

## 2021-12-14 PROCEDURE — 96374 THER/PROPH/DIAG INJ IV PUSH: CPT

## 2021-12-14 RX ORDER — SODIUM CHLORIDE 0.9 % (FLUSH) 0.9 %
10 SYRINGE (ML) INJECTION AS NEEDED
Status: DISCONTINUED | OUTPATIENT
Start: 2021-12-14 | End: 2021-12-14 | Stop reason: HOSPADM

## 2021-12-14 RX ORDER — LORAZEPAM 2 MG/ML
1 INJECTION INTRAMUSCULAR ONCE
Status: COMPLETED | OUTPATIENT
Start: 2021-12-14 | End: 2021-12-14

## 2021-12-14 RX ORDER — LORAZEPAM 2 MG/ML
0.5 INJECTION INTRAMUSCULAR ONCE
Status: COMPLETED | OUTPATIENT
Start: 2021-12-14 | End: 2021-12-14

## 2021-12-14 RX ORDER — CLONAZEPAM 1 MG/1
1 TABLET ORAL 2 TIMES DAILY PRN
Qty: 6 TABLET | Refills: 0 | Status: SHIPPED | OUTPATIENT
Start: 2021-12-14 | End: 2022-03-01

## 2021-12-14 RX ADMIN — LORAZEPAM 1 MG: 2 INJECTION INTRAMUSCULAR; INTRAVENOUS at 17:23

## 2021-12-14 RX ADMIN — LORAZEPAM 0.5 MG: 2 INJECTION INTRAMUSCULAR; INTRAVENOUS at 18:10

## 2021-12-14 NOTE — TELEPHONE ENCOUNTER
Returned patient phone call. Patient states in unabairable pain and cramping.  Had lower than her normal blood pressure reading last pm and heart rate elevated. Instructed patient to go to ED to be evaluated.

## 2021-12-14 NOTE — ED PROVIDER NOTES
Time: 1700  Arrived by: Private vehicle  Chief Complaint: Shortness of breath, body aches  History provided by: Patient    History of Present Illness:  Patient is a 61 y.o. year old female that presents to the emergency department with complaint of some shortness of breath and some body aches today.    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    She arrives hyperventilating and also complaining of feeling weird or dizzy, some nausea, some muscle aches including muscle spasms in the hands and wrists and also in the calves, numbness and tingling down both legs and feet.    She has a history of anxiety and panic attacks and has recently weaned herself down off of her Klonopin this past week.    She denies any fevers or chills or loss of taste and smell and no vomiting or diarrhea.    No chest pain reported.    Similar Symptoms Previously: No  Recently seen: No      Patient Care Team  Primary Care Provider: Charo Carty    Past Medical History:   Anxiety and depression, asthma, GERD, hyperlipidemia    Social History     Socioeconomic History   • Marital status:    Tobacco Use   • Smoking status: Never Smoker   • Smokeless tobacco: Never Used   Vaping Use   • Vaping Use: Never used   Substance and Sexual Activity   • Alcohol use: Yes     Alcohol/week: 8.0 standard drinks     Types: 4 Glasses of wine, 4 Standard drinks or equivalent per week     Comment: WINE ALCOHOL   • Drug use: Never   • Sexual activity: Defer         Allergies   Allergen Reactions   • Amoxicillin-Pot Clavulanate GI Intolerance   • Esomeprazole Hives   • Metronidazole Hives and Itching   • Tetracycline Other (See Comments) and Rash     Skin peeling on palms and feet       Past Medical History:   Diagnosis Date   • Asthma    • Broken bones    • Chronic allergic rhinitis    • Depression with anxiety    • Essential hypertension    • GERD (gastroesophageal reflux disease)    • Hyperlipidemia    • Migraine headache     • Psychiatric disorder    • Seasonal allergies    • Shortness of breath    • Sinus trouble      Past Surgical History:   Procedure Laterality Date   • COLON SURGERY     • ENDOMETRIAL ABLATION     • ENDOSCOPY     • HAMMER TOE REPAIR Left    • [facility] NEUROMA EXCISION Right      Family History   Problem Relation Age of Onset   • Heart failure Mother    • Hyperlipidemia Mother    • Hypertension Mother    • Rheumatic fever Mother    • Stroke Mother    • Heart attack Mother    • Melanoma Brother        Home Medications:  Prior to Admission medications    Medication Sig Start Date End Date Taking? Authorizing Provider   Advair Diskus 500-50 MCG/DOSE DISKUS INHALE ONE PUFF BY MOUTH TWICE A DAY IN THE MORNING AND EVENING APPROXIMATELY 12 HOURS APART 11/16/21   Charo Carty APRN   albuterol sulfate HFA (Ventolin HFA) 108 (90 Base) MCG/ACT inhaler Ventolin HFA 90 mcg/actuation inhalation HFA aerosol inhaler inhale 2 puffs (180 mcg) by inhalation route every 6 hours   Active    Emergency, Nurse JAN Patrick   albuterol sulfate  (90 Base) MCG/ACT inhaler Inhale 2 puffs Every 6 (Six) Hours As Needed for Wheezing. 11/17/21   River Sargent MD   aspirin (aspirin) 81 MG EC tablet Take 81 mg by mouth Daily.    Emergency, Nurse JAN Patrick   atorvastatin (LIPITOR) 20 MG tablet Take 1 tablet by mouth Daily. 9/30/21   Charo Carty APRN   azithromycin (Zithromax Z-Hemant) 250 MG tablet Take as directed 11/17/21   River Sargent MD   benzonatate (Tessalon Perles) 100 MG capsule Take 1 capsule by mouth 3 (Three) Times a Day As Needed for Cough. 11/19/21   Charo Carty APRN   clonazePAM (KlonoPIN) 0.5 MG tablet     Emergency, Nurse JAN Patrick   lisinopril (PRINIVIL,ZESTRIL) 30 MG tablet Take 1 tablet by mouth Daily. 11/23/21   Charo Carty APRN   meclizine (ANTIVERT) 25 MG tablet meclizine 25 mg oral tablet take 1 tablet (25 mg) by oral route 3 times per day as needed   Active    Emergency, Nurse Celina RN  "  montelukast (SINGULAIR) 10 MG tablet Take 1 tablet by mouth Daily. 10/4/21   Charo Carty APRN   omeprazole (priLOSEC) 40 MG capsule TAKE ONE CAPSULE BY MOUTH DAILY BEFORE A MEAL 11/16/21   Charo Carty APRN   PARoxetine (Paxil) 40 MG tablet Take 1 tablet by mouth Every Morning. 11/2/21   Charo Carty APRN   potassium chloride ER (K-TAB) 20 MEQ tablet controlled-release ER tablet TAKE TWO TABLETS BY MOUTH TWICE A DAY WITH FOOD 11/1/21   Charo Carty APRN   Prenatal Multivit-Min-Fe-FA (Prenatal Forte) tablet Take 1 tablet by mouth Daily.    Emergency, Nurse Celina, RN   promethazine-dextromethorphan (PROMETHAZINE-DM) 6.25-15 MG/5ML syrup Take 5 mL by mouth 4 (Four) Times a Day As Needed for Cough. 11/17/21   River Sargent MD   VITAMIN D, CHOLECALCIFEROL, PO Take  by mouth.    Emergency, Nurse Epic, RN        Record Review:  I have reviewed the patient's records in [facility].     Review of Systems:  Review of Systems   I performed a 10 point review of systems that were all negative with exception of shortness of breath and body aches and other positives found in the HPI above.    Physical Exam:  /77   Pulse 113   Temp 98.4 °F (36.9 °C) (Oral)   Resp 28   Ht 170.2 cm (67\")   Wt 85.9 kg (189 lb 6 oz)   SpO2 97%   BMI 29.66 kg/m²     Physical Exam   General: Awake alert and appears to be hyperventilating and having a panic attack, looks anxious    HEENT: Head normocephalic atraumatic, eyes PERRLA EOMI, nose normal, oropharynx normal.    Neck: Supple full range of motion, no meningismus, no lymphadenopathy    Heart: Tachycardic, but in a regular rhythm, no murmurs or rubs, 2+ radial pulses bilaterally    Lungs: Clear to auscultation bilaterally without wheezes or crackles, tachypnea, hyperventilating, looks to be having panic attack    Abdomen: Soft, nontender, nondistended, no rebound or guarding    Skin: Warm, dry, no rash    Musculoskeletal: Normal range of motion, no lower extremity " edema, no calf tenderness unilaterally    Neurologic: Oriented x3, no motor deficits no sensory deficits    Psychiatric: Mood appears stable, no psychosis        Medications in the Emergency Department:  Medications   sodium chloride 0.9 % flush 10 mL (has no administration in time range)   LORazepam (ATIVAN) injection 1 mg (1 mg Intravenous Given 12/14/21 1723)   LORazepam (ATIVAN) injection 0.5 mg (0.5 mg Intravenous Given 12/14/21 1810)        Labs  Lab Results (last 24 hours)     Procedure Component Value Units Date/Time    CBC & Differential [[id number]]  (Abnormal) Collected: 12/14/21 1350    Specimen: Blood from Arm, Right Updated: 12/14/21 5574    Narrative:      The following orders were created for panel order CBC & Differential.  Procedure                               Abnormality         Status                     ---------                               -----------         ------                     CBC Auto Differential[[id number]]        Abnormal            Final result                 Please view results for these tests on the individual orders.    Comprehensive Metabolic Panel [[id number]]  (Abnormal) Collected: 12/14/21 1350    Specimen: Blood from Arm, Right Updated: 12/14/21 1459     Glucose 104 mg/dL      BUN 19 mg/dL      Creatinine 0.92 mg/dL      Sodium 136 mmol/L      Potassium 3.8 mmol/L      Chloride 99 mmol/L      CO2 23.2 mmol/L      Calcium 9.8 mg/dL      Total Protein 7.5 g/dL      Albumin 4.10 g/dL      ALT (SGPT) 25 U/L      AST (SGOT) 32 U/L      Alkaline Phosphatase 143 U/L      Total Bilirubin 0.4 mg/dL      eGFR Non African Amer 62 mL/min/1.73      Globulin 3.4 gm/dL      A/G Ratio 1.2 g/dL      BUN/Creatinine Ratio 20.7     Anion Gap 13.8 mmol/L     Narrative:      GFR Normal >60  Chronic Kidney Disease <60  Kidney Failure <15      BNP [[id number]]  (Normal) Collected: 12/14/21 1350    Specimen: Blood from Arm, Right Updated: 12/14/21 1451     proBNP 70.7 pg/mL     Narrative:       Among patients with dyspnea, NT-proBNP is highly sensitive for the detection of acute congestive heart failure. In addition NT-proBNP of <300 pg/ml effectively rules out acute congestive heart failure with 99% negative predictive value.    Results may be falsely decreased if patient taking Biotin.      Troponin [[id number]]  (Normal) Collected: 12/14/21 1350    Specimen: Blood from Arm, Right Updated: 12/14/21 1451     Troponin T <0.010 ng/mL     Narrative:      Troponin T Reference Range:  <= 0.03 ng/mL-   Negative for AMI  >0.03 ng/mL-     Abnormal for myocardial necrosis.  Clinicians would have to utilize clinical acumen, EKG, Troponin and serial changes to determine if it is an Acute Myocardial Infarction or myocardial injury due to an underlying chronic condition.       Results may be falsely decreased if patient taking Biotin.      CBC Auto Differential [[id number]]  (Abnormal) Collected: 12/14/21 1350    Specimen: Blood from Arm, Right Updated: 12/14/21 1424     WBC 9.56 10*3/mm3      RBC 4.40 10*6/mm3      Hemoglobin 13.4 g/dL      Hematocrit 41.1 %      MCV 93.4 fL      MCH 30.5 pg      MCHC 32.6 g/dL      RDW 14.1 %      RDW-SD 48.5 fl      MPV 9.0 fL      Platelets 411 10*3/mm3      Neutrophil % 67.9 %      Lymphocyte % 18.8 %      Monocyte % 10.5 %      Eosinophil % 0.9 %      Basophil % 0.7 %      Immature Grans % 1.2 %      Neutrophils, Absolute 6.49 10*3/mm3      Lymphocytes, Absolute 1.80 10*3/mm3      Monocytes, Absolute 1.00 10*3/mm3      Eosinophils, Absolute 0.09 10*3/mm3      Basophils, Absolute 0.07 10*3/mm3      Immature Grans, Absolute 0.11 10*3/mm3      nRBC 0.0 /100 WBC     COVID-19,APTIMA PANTHER(ALEKSANDER),[facility], NP/OP SWAB IN UTM/VTM/SALINE TRANSPORT MEDIA,24 HR TAT - Swab, Nasopharynx [[id number]] Collected: 12/14/21 1733    Specimen: Swab from Nasopharynx Updated: 12/14/21 1738    D-dimer, Quantitative [[id number]]  (Normal) Collected: 12/14/21 1853    Specimen: Blood Updated: 12/14/21  1921     D-Dimer, Quantitative 0.28 mg/L (FEU)     Narrative:      Effective 6/30/09 new normal reference range: <= 0.59 mg/L FEU  Increases in D-dimer concentration observed with  thromboembolic events can be variable due to localization,  size and age of the thrombus. Therefore, a thromboembolic  event cannot be diagnosed with certainty on the basis of the  reference range.  D-dimers may also be elevated for a variety of disorders   including: advanced age, pregnancy, coronary disease, cancer,  liver disease, infection, inflammation, hematoma, DIC, trauma,  post surgery, diabetes, thrombolytic therapy, stress, and  general hospitalization. D-dimer levels may be decreased in  patients on anticoagulant therapy.           Imaging:  XR Chest 1 View    Result Date: 12/14/2021  PROCEDURE: XR CHEST 1 VW  COMPARISON: [facility], CR, CHEST PA/AP & LAT 2V, 11/10/2020, 12:08.  [facility], , XR CHEST 2 VW, 11/17/2021, 18:02.  INDICATIONS: SOA Triage Protocol  FINDINGS:   The lungs are well-expanded. The heart and pulmonary vasculature are within normal limits. No pleural effusions are identified. There are no active appearing infiltrates.  IMPRESSION: No active disease.  JUNIOR BLANK MD       Electronically Signed and Approved By: JUNIOR BLANK MD on 12/14/2021 at 15:26                EKG:   I reviewed and interpreted her twelve-lead EKG is sinus tachycardia at 110 bpm, normal P waves, normal QRS, normal ST and T wave; no acute ischemia, unchanged from old EKG in November 2020.      Procedures:  Procedures    Progress                            Medical Decision Making:  MDM   Differential diagnosis for this patient with dyspnea includes asthma or COPD exacerbation, CHF exacerbation, bronchitis or pneumonia, pneumothorax, pleural effusion or pulmonary edema, or less likely MI or PE.      This patient is a 61-year-old female who has had some COVID-19 exposure now presenting for body aches  and dyspnea, but oxygenating 100% on room air.  Chest x-ray looks normal and lung fields are clear.      Clinically, she appears to be having a panic attack and hyperventilating while satting 100% with clear breath sounds.    It sounds like she just weaned herself off of her Klonopin this past week, and I am going to give her a dose of IV Ativan for severe anxiety and hyperventilation here.    Giving her 2 doses of Ativan and she looks much improved although still somewhat anxious.    Troponin and x-ray and D-dimer all came back normal and she is oxygenating 100% still.    We will come up with the plan for her to do a slower wean off her Klonopin and follow-up closely with her PCP.    Although I do not think she actually has signs or symptoms of COVID-19, she was exposed at her workplace so we will send off a swab as well.      Final diagnoses:   Generalized anxiety disorder with panic attacks   Anxiety hyperventilation   Muscle spasm        Disposition:  ED Disposition     ED Disposition Condition Comment    Discharge Stable           This medical record created using voice recognition software and may contain unintended errors.        Bernard Noble MD  12/14/21 4073

## 2021-12-14 NOTE — ED NOTES
Pt presents to ER per self r/t SOA. Pt appears anxious during exam. 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 Reports the doctor's office is closed due to COVID within the staff. Pt reports she has had generalized body aches and numbness and aches to RLE. Pt reports she experienced this pain in the past and it was diagnosis as hypokalemia. Pt reports she took extra potassium PO last night. Pt denies cough.     Kellie Partida RN  12/14/21 0704

## 2021-12-14 NOTE — TELEPHONE ENCOUNTER
She put this request in for labs yesterday. Was advised to go to ED today (there are labs results in epic) thought you might want to review those prior to deciding which pre-office visit labs you would like to order.

## 2021-12-15 ENCOUNTER — TELEPHONE (OUTPATIENT)
Dept: INTERNAL MEDICINE | Facility: CLINIC | Age: 61
End: 2021-12-15

## 2021-12-15 LAB — SARS-COV-2 RNA PNL SPEC NAA+PROBE: DETECTED

## 2021-12-15 NOTE — DISCHARGE INSTRUCTIONS
It sounds like you may need to do a longer and more gradual taper off your Klonopin, as you appear to have a bad panic attack and were hyperventilating.    All of your work-up today looked normal and you are oxygenating 100%, but just hyperventilating.    No signs of a heart attack or any blood clots or anything serious or life-threatening were found.

## 2021-12-15 NOTE — TELEPHONE ENCOUNTER
Caller: Shanthi Akers    Relationship: Self    Best call back number: [phone]    What medication are you requesting: COUGH SYRUP AND MUSCLE RELAXER    What are your current symptoms: COUGH AND CRAMPING DUE TO COVID    How long have you been experiencing symptoms: TWO DAYS    Have you had these symptoms before:    [x] Yes  [] No    Have you been treated for these symptoms before:   [x] Yes  [] No    If a prescription is needed, what is your preferred pharmacy and phone number: [facility] #[id number] - [place], [place] - [address] [facility] [phone] [facility] [phone] FX     Additional notes:  THE PATIENT LEFT THE EMERGENCY ROOM 12/14/2021 AND WAS DIAGNOSED WITH COVID. SHE WAS NOT GIVEN ANY MEDICATION AND HER COUGH IS VERY SEVERE AND HER HANDS, FEET, AND LEGS ARE CRAMPING BADLY AND CAUSING HER TO CRY FROM PAIN. SHE WOULD LIKE PCP TO PRESCRIBE A MUSCLE RELAXER AND A COUGH SYRUP. SHE ALSO STATED [employer] DO NOT WORK FOR HER.

## 2021-12-16 NOTE — TELEPHONE ENCOUNTER
Caller: Shanthi Akers    Relationship: Self    Best call back number: [phone]    Who are you requesting to speak with (clinical staff, provider,  specific staff member):  MEDICAL STAFF    What was the call regarding: PATIENT WOULD LIKE TO KNOW IF PCP DECIDED ON SENDING ANYTHING IN FOR HER. SHE IS FEELING REALLY SICK AND WOULD LIKE TO STAY AWAY FROM THE ED IF POSSIBLE. ATTEMPTED TO WARM TRANSFER. PLEASE CALL PATIENT TO ADVISE.

## 2021-12-17 RX ORDER — DEXTROMETHORPHAN HYDROBROMIDE AND PROMETHAZINE HYDROCHLORIDE 15; 6.25 MG/5ML; MG/5ML
5 SYRUP ORAL 4 TIMES DAILY PRN
Qty: 100 ML | Refills: 0 | Status: SHIPPED | OUTPATIENT
Start: 2021-12-17 | End: 2022-01-31 | Stop reason: SDUPTHER

## 2021-12-17 RX ORDER — CYCLOBENZAPRINE HCL 5 MG
5 TABLET ORAL 3 TIMES DAILY PRN
Qty: 30 TABLET | Refills: 0 | Status: SHIPPED | OUTPATIENT
Start: 2021-12-17 | End: 2021-12-29 | Stop reason: SDUPTHER

## 2021-12-17 NOTE — TELEPHONE ENCOUNTER
Medication sent. She needs to be very cautious and avoid taking these together as they both have increased risk for drowsiness. Would limit use to PRN only.

## 2021-12-28 ENCOUNTER — HOSPITAL ENCOUNTER (EMERGENCY)
Facility: HOSPITAL | Age: 61
Discharge: HOME | End: 2021-12-28
Attending: EMERGENCY MEDICINE | Admitting: EMERGENCY MEDICINE

## 2021-12-28 ENCOUNTER — OFFICE VISIT (OUTPATIENT)
Dept: INTERNAL MEDICINE | Facility: CLINIC | Age: 61
End: 2021-12-28

## 2021-12-28 ENCOUNTER — APPOINTMENT (OUTPATIENT)
Dept: CT IMAGING | Facility: HOSPITAL | Age: 61
End: 2021-12-28

## 2021-12-28 ENCOUNTER — TELEPHONE (OUTPATIENT)
Dept: INTERNAL MEDICINE | Facility: CLINIC | Age: 61
End: 2021-12-28

## 2021-12-28 VITALS
SYSTOLIC BLOOD PRESSURE: 126 MMHG | WEIGHT: 186.95 LBS | HEIGHT: 67 IN | RESPIRATION RATE: 22 BRPM | HEART RATE: 113 BPM | TEMPERATURE: 98.1 F | DIASTOLIC BLOOD PRESSURE: 81 MMHG | OXYGEN SATURATION: 91 % | BODY MASS INDEX: 29.34 KG/M2

## 2021-12-28 VITALS
OXYGEN SATURATION: 98 % | RESPIRATION RATE: 18 BRPM | TEMPERATURE: 97.5 F | HEIGHT: 67 IN | SYSTOLIC BLOOD PRESSURE: 110 MMHG | WEIGHT: 188 LBS | HEART RATE: 139 BPM | BODY MASS INDEX: 29.51 KG/M2 | DIASTOLIC BLOOD PRESSURE: 82 MMHG

## 2021-12-28 DIAGNOSIS — E78.5 HYPERLIPIDEMIA, UNSPECIFIED HYPERLIPIDEMIA TYPE: ICD-10-CM

## 2021-12-28 DIAGNOSIS — R06.02 SHORTNESS OF BREATH: ICD-10-CM

## 2021-12-28 DIAGNOSIS — J15.9 COMMUNITY ACQUIRED BACTERIAL PNEUMONIA: Primary | ICD-10-CM

## 2021-12-28 DIAGNOSIS — R00.0 TACHYCARDIA: ICD-10-CM

## 2021-12-28 DIAGNOSIS — U07.1 COVID-19: Primary | ICD-10-CM

## 2021-12-28 LAB
ALBUMIN SERPL-MCNC: 4.3 G/DL (ref 3.5–5.2)
ALBUMIN/GLOB SERPL: 1.2 G/DL
ALP SERPL-CCNC: 176 U/L (ref 39–117)
ALT SERPL W P-5'-P-CCNC: 20 U/L (ref 1–33)
ANION GAP SERPL CALCULATED.3IONS-SCNC: 12.4 MMOL/L (ref 5–15)
AST SERPL-CCNC: 22 U/L (ref 1–32)
BASOPHILS # BLD AUTO: 0.11 10*3/MM3 (ref 0–0.2)
BASOPHILS NFR BLD AUTO: 0.8 % (ref 0–1.5)
BILIRUB SERPL-MCNC: 0.3 MG/DL (ref 0–1.2)
BUN SERPL-MCNC: 17 MG/DL (ref 6–20)
BUN/CREAT SERPL: 16.2 (ref 7–25)
CALCIUM SPEC-SCNC: 10.1 MG/DL (ref 8.6–10.5)
CHLORIDE SERPL-SCNC: 96 MMOL/L (ref 98–107)
CO2 SERPL-SCNC: 22.6 MMOL/L (ref 22–29)
CREAT SERPL-MCNC: 1.05 MG/DL (ref 0.57–1)
D-LACTATE SERPL-SCNC: 1.4 MMOL/L (ref 0.5–2)
DEPRECATED RDW RBC AUTO: 45.2 FL (ref 37–54)
EOSINOPHIL # BLD AUTO: 0.25 10*3/MM3 (ref 0–0.4)
EOSINOPHIL NFR BLD AUTO: 1.8 % (ref 0.3–6.2)
ERYTHROCYTE [DISTWIDTH] IN BLOOD BY AUTOMATED COUNT: 13.4 % (ref 12.3–15.4)
GFR SERPL CREATININE-BSD FRML MDRD: 54 ML/MIN/1.73
GLOBULIN UR ELPH-MCNC: 3.7 GM/DL
GLUCOSE SERPL-MCNC: 101 MG/DL (ref 65–99)
HCT VFR BLD AUTO: 43.1 % (ref 34–46.6)
HGB BLD-MCNC: 14.3 G/DL (ref 12–15.9)
HOLD SPECIMEN: NORMAL
HOLD SPECIMEN: NORMAL
IMM GRANULOCYTES # BLD AUTO: 0.33 10*3/MM3 (ref 0–0.05)
IMM GRANULOCYTES NFR BLD AUTO: 2.3 % (ref 0–0.5)
LYMPHOCYTES # BLD AUTO: 2.3 10*3/MM3 (ref 0.7–3.1)
LYMPHOCYTES NFR BLD AUTO: 16.2 % (ref 19.6–45.3)
MCH RBC QN AUTO: 30.5 PG (ref 26.6–33)
MCHC RBC AUTO-ENTMCNC: 33.2 G/DL (ref 31.5–35.7)
MCV RBC AUTO: 91.9 FL (ref 79–97)
MONOCYTES # BLD AUTO: 0.84 10*3/MM3 (ref 0.1–0.9)
MONOCYTES NFR BLD AUTO: 5.9 % (ref 5–12)
NEUTROPHILS NFR BLD AUTO: 10.33 10*3/MM3 (ref 1.7–7)
NEUTROPHILS NFR BLD AUTO: 73 % (ref 42.7–76)
NRBC BLD AUTO-RTO: 0 /100 WBC (ref 0–0.2)
PLATELET # BLD AUTO: 486 10*3/MM3 (ref 140–450)
PMV BLD AUTO: 8.7 FL (ref 6–12)
POTASSIUM SERPL-SCNC: 4.5 MMOL/L (ref 3.5–5.2)
PROT SERPL-MCNC: 8 G/DL (ref 6–8.5)
RBC # BLD AUTO: 4.69 10*6/MM3 (ref 3.77–5.28)
SODIUM SERPL-SCNC: 131 MMOL/L (ref 136–145)
WBC NRBC COR # BLD: 14.16 10*3/MM3 (ref 3.4–10.8)
WHOLE BLOOD HOLD SPECIMEN: NORMAL
WHOLE BLOOD HOLD SPECIMEN: NORMAL

## 2021-12-28 PROCEDURE — 36415 COLL VENOUS BLD VENIPUNCTURE: CPT

## 2021-12-28 PROCEDURE — 96374 THER/PROPH/DIAG INJ IV PUSH: CPT

## 2021-12-28 PROCEDURE — 99283 EMERGENCY DEPT VISIT LOW MDM: CPT

## 2021-12-28 PROCEDURE — 93005 ELECTROCARDIOGRAM TRACING: CPT | Performed by: EMERGENCY MEDICINE

## 2021-12-28 PROCEDURE — 80053 COMPREHEN METABOLIC PANEL: CPT | Performed by: EMERGENCY MEDICINE

## 2021-12-28 PROCEDURE — 83605 ASSAY OF LACTIC ACID: CPT | Performed by: EMERGENCY MEDICINE

## 2021-12-28 PROCEDURE — 87040 BLOOD CULTURE FOR BACTERIA: CPT | Performed by: EMERGENCY MEDICINE

## 2021-12-28 PROCEDURE — 25010000002 DEXAMETHASONE PER 1 MG: Performed by: PHYSICIAN ASSISTANT

## 2021-12-28 PROCEDURE — 94799 UNLISTED PULMONARY SVC/PX: CPT

## 2021-12-28 PROCEDURE — 94640 AIRWAY INHALATION TREATMENT: CPT

## 2021-12-28 PROCEDURE — 99213 OFFICE O/P EST LOW 20 MIN: CPT | Performed by: NURSE PRACTITIONER

## 2021-12-28 PROCEDURE — 93005 ELECTROCARDIOGRAM TRACING: CPT

## 2021-12-28 PROCEDURE — 0 IOPAMIDOL PER 1 ML: Performed by: EMERGENCY MEDICINE

## 2021-12-28 PROCEDURE — 96372 THER/PROPH/DIAG INJ SC/IM: CPT

## 2021-12-28 PROCEDURE — 71275 CT ANGIOGRAPHY CHEST: CPT

## 2021-12-28 PROCEDURE — 85025 COMPLETE CBC W/AUTO DIFF WBC: CPT

## 2021-12-28 PROCEDURE — 25010000002 KETOROLAC TROMETHAMINE PER 15 MG: Performed by: PHYSICIAN ASSISTANT

## 2021-12-28 PROCEDURE — 87040 BLOOD CULTURE FOR BACTERIA: CPT

## 2021-12-28 RX ORDER — SODIUM CHLORIDE 0.9 % (FLUSH) 0.9 %
10 SYRINGE (ML) INJECTION AS NEEDED
Status: DISCONTINUED | OUTPATIENT
Start: 2021-12-28 | End: 2021-12-28 | Stop reason: HOSPADM

## 2021-12-28 RX ORDER — DEXAMETHASONE SODIUM PHOSPHATE 10 MG/ML
10 INJECTION INTRAMUSCULAR; INTRAVENOUS ONCE
Status: COMPLETED | OUTPATIENT
Start: 2021-12-28 | End: 2021-12-28

## 2021-12-28 RX ORDER — CEFDINIR 300 MG/1
300 CAPSULE ORAL 2 TIMES DAILY
Qty: 14 CAPSULE | Refills: 0 | Status: SHIPPED | OUTPATIENT
Start: 2021-12-28 | End: 2022-01-04

## 2021-12-28 RX ORDER — AZITHROMYCIN 250 MG/1
TABLET, FILM COATED ORAL
Qty: 6 TABLET | Refills: 0 | Status: SHIPPED | OUTPATIENT
Start: 2021-12-28 | End: 2022-03-01

## 2021-12-28 RX ORDER — CEFDINIR 300 MG/1
300 CAPSULE ORAL 2 TIMES DAILY
Qty: 14 CAPSULE | Refills: 0 | OUTPATIENT
Start: 2021-12-28 | End: 2021-12-28

## 2021-12-28 RX ORDER — KETOROLAC TROMETHAMINE 15 MG/ML
15 INJECTION, SOLUTION INTRAMUSCULAR; INTRAVENOUS ONCE
Status: COMPLETED | OUTPATIENT
Start: 2021-12-28 | End: 2021-12-28

## 2021-12-28 RX ORDER — BROMPHENIRAMINE MALEATE, PSEUDOEPHEDRINE HYDROCHLORIDE, AND DEXTROMETHORPHAN HYDROBROMIDE 2; 30; 10 MG/5ML; MG/5ML; MG/5ML
10 SYRUP ORAL 4 TIMES DAILY PRN
Qty: 118 ML | Refills: 0 | Status: SHIPPED | OUTPATIENT
Start: 2021-12-28 | End: 2022-02-24

## 2021-12-28 RX ORDER — POTASSIUM CHLORIDE 1500 MG/1
20 TABLET, FILM COATED, EXTENDED RELEASE ORAL DAILY
Qty: 180 TABLET | Refills: 1 | Status: SHIPPED | OUTPATIENT
Start: 2021-12-28 | End: 2022-09-14

## 2021-12-28 RX ORDER — IPRATROPIUM BROMIDE AND ALBUTEROL SULFATE 2.5; .5 MG/3ML; MG/3ML
3 SOLUTION RESPIRATORY (INHALATION) ONCE
Status: COMPLETED | OUTPATIENT
Start: 2021-12-28 | End: 2021-12-28

## 2021-12-28 RX ORDER — CEFPODOXIME PROXETIL 200 MG/1
200 TABLET, FILM COATED ORAL 2 TIMES DAILY
Qty: 14 TABLET | Refills: 0 | Status: SHIPPED | OUTPATIENT
Start: 2021-12-28 | End: 2021-12-28 | Stop reason: SDUPTHER

## 2021-12-28 RX ORDER — ATORVASTATIN CALCIUM 20 MG/1
20 TABLET, FILM COATED ORAL DAILY
Qty: 90 TABLET | Refills: 1 | Status: SHIPPED | OUTPATIENT
Start: 2021-12-28 | End: 2022-03-09

## 2021-12-28 RX ADMIN — IPRATROPIUM BROMIDE AND ALBUTEROL SULFATE 3 ML: 2.5; .5 SOLUTION RESPIRATORY (INHALATION) at 18:15

## 2021-12-28 RX ADMIN — SODIUM CHLORIDE 1000 ML: 9 INJECTION, SOLUTION INTRAVENOUS at 18:01

## 2021-12-28 RX ADMIN — IOPAMIDOL 100 ML: 755 INJECTION, SOLUTION INTRAVENOUS at 18:47

## 2021-12-28 RX ADMIN — DEXAMETHASONE SODIUM PHOSPHATE 10 MG: 10 INJECTION INTRAMUSCULAR; INTRAVENOUS at 18:02

## 2021-12-28 RX ADMIN — KETOROLAC TROMETHAMINE 15 MG: 15 INJECTION, SOLUTION INTRAMUSCULAR; INTRAVENOUS at 18:02

## 2021-12-28 NOTE — ASSESSMENT & PLAN NOTE
Well controlled, continue lisinopril. Encouraged patient to continue to monitor blood pressure at home and to call or return to clinic with consistent elevations greater than 130/80.  Reviewed recent CMP that was without concern.

## 2021-12-28 NOTE — ASSESSMENT & PLAN NOTE
Patient off quarantine x3 days, symptoms worsening.  O2 sat 98%, decreased breath sounds bilateral lower lobes, tachycardia with heart rate 139.  CXR in clinic nonspecific with faint peripheral opacities noted in the mid and lower lung fields. Due to worsening shortness of breath, chest tightness and tachycardia STAT chest CT indicated to rule out PE. Unable to get scheduled today, patient to proceed directly to the ED for imaging at this time. She is aware of risk of PE and would like to go by personal vehicle. Cefdinir to pharmacy for potential pneumonia due to patients Augmentin and tetracycline allergies. Will schedule follow up based on results of ED imaging. Patient voices understanding and is agreeable to plan.

## 2021-12-28 NOTE — PROGRESS NOTES
"Chief Complaint  Follow-up, Shortness of Breath, and Fatigue    Subjective          Shanthi Akers presents to [facility]  Patient tested positive for COVID19 12/16, off quarantine since 12/25. Patient reports she continues to have significant fatigue, shortness of breath, cough and chest tightness. Patient states she has also experienced dizziness and sweating. Denies chest pain. CXR 12/14 without acute process.     HTN-  Lisinopril increased at previous visit. She has not been checking her blood pressure at home. Denies chest pain, headache, leg swelling.       Objective   Vital Signs:   /82 (BP Location: Right arm, Patient Position: Sitting, Cuff Size: Adult)   Pulse (!) 139   Temp 97.5 °F (36.4 °C) (Temporal)   Resp 18   Ht 170.2 cm (67\")   Wt 85.3 kg (188 lb)   SpO2 98%   BMI 29.44 kg/m²     Physical Exam  Constitutional:       Appearance: Normal appearance. She is normal weight.   HENT:      Head: Normocephalic and atraumatic.      Right Ear: Tympanic membrane normal.      Left Ear: Tympanic membrane normal.      Nose: Nose normal.      Mouth/Throat:      Mouth: Mucous membranes are moist.      Pharynx: Oropharynx is clear.   Eyes:      Extraocular Movements: Extraocular movements intact.      Conjunctiva/sclera: Conjunctivae normal.      Pupils: Pupils are equal, round, and reactive to light.   Cardiovascular:      Rate and Rhythm: Normal rate and regular rhythm.      Heart sounds: Normal heart sounds.   Pulmonary:      Effort: Pulmonary effort is normal.      Breath sounds: Examination of the right-lower field reveals decreased breath sounds. Examination of the left-lower field reveals decreased breath sounds. Decreased breath sounds present.   Skin:     General: Skin is warm and dry.   Neurological:      General: No focal deficit present.      Mental Status: She is alert and oriented to person, place, and time.   Psychiatric:         Mood and " Affect: Mood normal.         Behavior: Behavior normal.         Thought Content: Thought content normal.        Result Review :                 Assessment and Plan    Diagnoses and all orders for this visit:    1. COVID-19 (Primary)  Assessment & Plan:  Patient off quarantine x3 days, symptoms worsening.  O2 sat 98%, decreased breath sounds bilateral lower lobes, tachycardia with heart rate 139.  CXR in clinic nonspecific with faint peripheral opacities noted in the mid and lower lung fields. Due to worsening shortness of breath, chest tightness and tachycardia STAT chest CT indicated to rule out PE. Unable to get scheduled today, patient to proceed directly to the ED for imaging at this time. She is aware of risk of PE and would like to go by personal vehicle. Cefdinir to pharmacy for potential pneumonia due to patients Augmentin and tetracycline allergies. Will schedule follow up based on results of ED imaging. Patient voices understanding and is agreeable to plan.    Orders:  -     XR Chest PA & Lateral (In Office)  -     CT Chest Pulmonary Embolism With Contrast; Future    2. Hyperlipidemia, unspecified hyperlipidemia type  -     atorvastatin (LIPITOR) 20 MG tablet; Take 1 tablet by mouth Daily.  Dispense: 90 tablet; Refill: 1    3. Shortness of breath  -     XR Chest PA & Lateral (In Office)  -     CT Chest Pulmonary Embolism With Contrast; Future    4. Tachycardia  -     CT Chest Pulmonary Embolism With Contrast; Future    Other orders  -     potassium chloride ER (K-TAB) 20 MEQ tablet controlled-release ER tablet; Take 1 tablet by mouth Daily.  Dispense: 180 tablet; Refill: 1  -     cefdinir (OMNICEF) 300 MG capsule; Take 1 capsule by mouth 2 (Two) Times a Day for 7 days.  Dispense: 14 capsule; Refill: 0      Follow Up   No follow-ups on file.  Patient was given instructions and counseling regarding her condition or for health maintenance advice. Please see specific information pulled into the AVS if  appropriate.

## 2021-12-28 NOTE — TELEPHONE ENCOUNTER
Red rule verified and correct.    Pt has been in waiting room x 1 hr with dx of Covid and SOB.    Pt seen in office this morning and sent to ED for R/O PE. Offered ambulance, however, pt chose to go in her POV.    Now she calls from the waiting room, still SOB.    Will call ED and give report.

## 2021-12-29 ENCOUNTER — TELEPHONE (OUTPATIENT)
Dept: INTERNAL MEDICINE | Facility: CLINIC | Age: 61
End: 2021-12-29

## 2021-12-29 RX ORDER — CYCLOBENZAPRINE HCL 5 MG
5 TABLET ORAL 3 TIMES DAILY PRN
Qty: 30 TABLET | Refills: 0 | Status: SHIPPED | OUTPATIENT
Start: 2021-12-29 | End: 2022-03-01

## 2021-12-29 NOTE — TELEPHONE ENCOUNTER
Would recommend continuing both for full coverage of pneumonia. Will send muscle relaxer to pharmacy.

## 2021-12-29 NOTE — TELEPHONE ENCOUNTER
Caller: Shanthi Akers    Relationship: Self    Best call back number: [phone]    What is the best time to reach you: ANY    Who are you requesting to speak with (clinical staff, provider,  specific staff member): FRONT/CLINICAL    What was the call regarding: PATIENT WAS SENT TO ER LAST NIGHT BY [facility] AND WAS TOLD TO CALL BACK TO MAKE FOLLOW UP APPOINTMENT. PATIENT WAS ALSO TOLD TO CALL [facility]'S NURSE AS WELL TO TALK ABOUT ER VISIT. SHE WOULD LIKE TO APPRECIATE [facility]'S STAFF FOR THEIR HELP. [facility]ORD[facility] WITH FRONT OFFICE STAFF AND WAS TOLD THEY WILL CALL PATIENT BACK.     Do you require a callback: YES

## 2021-12-29 NOTE — TELEPHONE ENCOUNTER
Red rule verified and correct.    Pt calling to update after being seen in the ED.    No PE, however she has pneumonia.    She waited 2 more hours in the wtg room after report was given to the ED nurse for R/O PE.    They placed pt on a Z-obed; asking if she is supposed to take the Omnicef as well.    Also asking for a muscle relaxer or Toradol (or antiinflammatory) for upper back pain.

## 2021-12-30 ENCOUNTER — TELEPHONE (OUTPATIENT)
Dept: INTERNAL MEDICINE | Facility: CLINIC | Age: 61
End: 2021-12-30

## 2021-12-30 NOTE — TELEPHONE ENCOUNTER
Caller: Shanthi Akers    Relationship: Self    Best call back number: [phone]    Requested Prescriptions:   MUSCLE RELAXER    Pharmacy where request should be sent:    [facility] [place], [place] - [address] AT [facility] [address] - [phone] [facility] [phone]   [phone]    Additional details provided by patient: PATIENT HAS BEEN IN CONTACT WITH [facility] AND THEY HAVE NO RECEIVED THE MUSCLE RELAXER MEDICATION THAT [facility] WAS GOING TO PRESCRIBE FOR THE PATIENT. SHE IS TRYING TO FIND OUT WHERE IT WAS SENT TO, AND WHEN HUB ASKED FOR PREFERENCE IN PHARMACY, SHE STATED [facility]. SHE IS REQUESTING A CALL BACK SO SHE CAN TAKE THE MEDICATION.     Will Cole Rep   12/30/21 15:14 EST

## 2022-01-02 LAB
BACTERIA SPEC AEROBE CULT: NORMAL
BACTERIA SPEC AEROBE CULT: NORMAL

## 2022-01-03 ENCOUNTER — OFFICE VISIT (OUTPATIENT)
Dept: INTERNAL MEDICINE | Facility: CLINIC | Age: 62
End: 2022-01-03

## 2022-01-03 VITALS
RESPIRATION RATE: 16 BRPM | WEIGHT: 186 LBS | TEMPERATURE: 97 F | BODY MASS INDEX: 29.19 KG/M2 | OXYGEN SATURATION: 96 % | HEIGHT: 67 IN | HEART RATE: 125 BPM

## 2022-01-03 DIAGNOSIS — R05.9 COUGH: ICD-10-CM

## 2022-01-03 DIAGNOSIS — U07.1 COVID-19: ICD-10-CM

## 2022-01-03 PROCEDURE — 99214 OFFICE O/P EST MOD 30 MIN: CPT | Performed by: PHYSICIAN ASSISTANT

## 2022-01-03 NOTE — PROGRESS NOTES
"Chief Complaint  Follow-up (Had covid, last day of quarintine was [id number], went to the ER.) and Pneumonia    Subjective          Shanthi Akers presents to [facility]  Follow up COVID/pneumonia- Seen in the ED with Rios Dhillon DO (12/28/2021).  She has been a little dizzy and coughing still.  She has been using her proair during coughing fits but hasn't seen much of a difference taking it.  O2 has been running between 93-97%, HR .  She is still coughing but having non-productive cough.  No fevers but has had increased sweating.          Objective   Vital Signs:   Pulse (!) 125   Temp 97 °F (36.1 °C) (Temporal)   Resp 16   Ht 170.2 cm (67\")   Wt 84.4 kg (186 lb)   SpO2 96%   BMI 29.13 kg/m²     Physical Exam   Result Review :          Procedures      Assessment and Plan    Diagnoses and all orders for this visit:    1. COVID-19  Assessment & Plan:  Vitals stable in office.  Will repeat CXR since patient is having persistent cough and recently diagnosed with PNA.  Continue Cefdinir as prescribed.  Reassuring physical exam.  Symptoms may be secondary to deconditioning due to COVID infection.  Would like for patient to rest more and return to work gradually.  Will have her follow up in office in 1-2 weeks for further evaluation.  Increase fluid intake.       2. Cough  -     XR Chest PA & Lateral (In Office)            Follow Up   Return if symptoms worsen or fail to improve.  Patient was given instructions and counseling regarding her condition or for health maintenance advice. Please see specific information pulled into the AVS if appropriate.       "

## 2022-01-03 NOTE — ASSESSMENT & PLAN NOTE
Vitals stable in office.  Will repeat CXR since patient is having persistent cough and recently diagnosed with PNA.  Continue Cefdinir as prescribed.  Reassuring physical exam.  Symptoms may be secondary to deconditioning due to COVID infection.  Would like for patient to rest more and return to work gradually.  Will have her follow up in office in 1-2 weeks for further evaluation.  Increase fluid intake.

## 2022-01-04 ENCOUNTER — TELEPHONE (OUTPATIENT)
Dept: INTERNAL MEDICINE | Facility: CLINIC | Age: 62
End: 2022-01-04

## 2022-01-04 NOTE — TELEPHONE ENCOUNTER
Caller: Shanthi Akers    Relationship: Self    Best call back number: [phone]    What test was performed: CHEST XRAY    When was the test performed: 01/03/2022    Where was the test performed: IN OFFICE    Additional notes: PATIENT REQUESTS CALLBACK ABOUT XRAY RESULTS.  PATIENT STATES  WE CAN LEAVE A DETAILED VOICEMAIL, IN NO ANSWER.

## 2022-01-05 LAB — QT INTERVAL: 314 MS

## 2022-01-12 RX ORDER — MONTELUKAST SODIUM 10 MG/1
10 TABLET ORAL DAILY
Qty: 90 TABLET | Refills: 1 | Status: SHIPPED | OUTPATIENT
Start: 2022-01-12 | End: 2022-01-13 | Stop reason: SDUPTHER

## 2022-01-13 ENCOUNTER — TELEPHONE (OUTPATIENT)
Dept: INTERNAL MEDICINE | Facility: CLINIC | Age: 62
End: 2022-01-13

## 2022-01-13 DIAGNOSIS — J45.20 MILD INTERMITTENT ASTHMA WITHOUT COMPLICATION: ICD-10-CM

## 2022-01-13 DIAGNOSIS — T78.40XS ALLERGY, SEQUELA: Primary | ICD-10-CM

## 2022-01-13 RX ORDER — BROMPHENIRAMINE MALEATE, PSEUDOEPHEDRINE HYDROCHLORIDE, AND DEXTROMETHORPHAN HYDROBROMIDE 2; 30; 10 MG/5ML; MG/5ML; MG/5ML
10 SYRUP ORAL 4 TIMES DAILY PRN
Qty: 118 ML | Refills: 0 | Status: CANCELLED | OUTPATIENT
Start: 2022-01-13

## 2022-01-13 RX ORDER — MONTELUKAST SODIUM 10 MG/1
10 TABLET ORAL DAILY
Qty: 90 TABLET | Refills: 1 | Status: SHIPPED | OUTPATIENT
Start: 2022-01-13 | End: 2022-01-17 | Stop reason: SDUPTHER

## 2022-01-13 NOTE — TELEPHONE ENCOUNTER
Red rule verified and correct.    Pt states she is still is SOB even without exertion, but worse with exertion.    Some general achiness and hand/feet cramping    SpO2 94-95% sitting.    HR in the 110 - 120

## 2022-01-13 NOTE — TELEPHONE ENCOUNTER
Caller: Shanthi Akers    Relationship: Self    Best call back number: [phone]    Requested Prescriptions:   Requested Prescriptions     Pending Prescriptions Disp Refills   • montelukast (SINGULAIR) 10 MG tablet 90 tablet 1     Sig: Take 1 tablet by mouth Daily.   • brompheniramine-pseudoephedrine-DM 30-2-10 MG/5ML syrup 118 mL 0     Sig: Take 10 mL by mouth 4 (Four) Times a Day As Needed for Allergies.        Pharmacy where request should be sent: [facility] #[id number] - [place], [place] - [address] [facility] [phone] [facility] [phone] FX     Additional details provided by patient: PATIENT STATED NEED FOR MORE OF THE COUGH MEDICATION DUE TO CONTINUED COUGH CAUSING LOSS OF SLEEP    Does the patient have less than a 3 day supply:  [x] Yes  [] No    Will SOOD Rep   01/13/22 14:49 EST

## 2022-01-17 RX ORDER — MONTELUKAST SODIUM 10 MG/1
10 TABLET ORAL DAILY
Qty: 90 TABLET | Refills: 1 | Status: SHIPPED | OUTPATIENT
Start: 2022-01-17 | End: 2022-10-19 | Stop reason: SDUPTHER

## 2022-01-17 NOTE — TELEPHONE ENCOUNTER
Yes, for some reason that wasn't routed to me when the message was put in four days ago. Please call and check on her and see how she is feeling today.

## 2022-01-17 NOTE — TELEPHONE ENCOUNTER
Red rule verified and correct.    Pt upset meds did not go to [facility].    Advised [facility] requested the singulair so it was sent there.    All other pharmacies deleted out of her profile per pt request.    Requested Prescriptions     Signed Prescriptions Disp Refills   • montelukast (SINGULAIR) 10 MG tablet 90 tablet 1     Sig: Take 1 tablet by mouth Daily.     Authorizing Provider: BEBE MIDDLETON     Ordering User: MAUREEN MAYO     Sent to [facility].    Pt still coughing. Cough med declined.    Recommended: honey cough drops, tsp honey prn cough, humidifier basilio at night, raise the head of the bed up, warm fluids.    Pt will try these.    Declined an appt right now.

## 2022-01-31 ENCOUNTER — OFFICE VISIT (OUTPATIENT)
Dept: INTERNAL MEDICINE | Facility: CLINIC | Age: 62
End: 2022-01-31

## 2022-01-31 VITALS
HEIGHT: 67 IN | SYSTOLIC BLOOD PRESSURE: 130 MMHG | HEART RATE: 107 BPM | OXYGEN SATURATION: 99 % | TEMPERATURE: 96.1 F | DIASTOLIC BLOOD PRESSURE: 70 MMHG | WEIGHT: 184 LBS | BODY MASS INDEX: 28.88 KG/M2 | RESPIRATION RATE: 14 BRPM

## 2022-01-31 DIAGNOSIS — U07.1 COVID-19: Primary | ICD-10-CM

## 2022-01-31 DIAGNOSIS — R05.9 COUGH: ICD-10-CM

## 2022-01-31 PROCEDURE — 99213 OFFICE O/P EST LOW 20 MIN: CPT | Performed by: NURSE PRACTITIONER

## 2022-01-31 RX ORDER — POTASSIUM CHLORIDE 20 MEQ/1
20 TABLET, EXTENDED RELEASE ORAL DAILY
COMMUNITY
Start: 2022-01-19 | End: 2022-03-01

## 2022-01-31 RX ORDER — DEXTROMETHORPHAN HYDROBROMIDE AND PROMETHAZINE HYDROCHLORIDE 15; 6.25 MG/5ML; MG/5ML
5 SYRUP ORAL EVERY 6 HOURS PRN
Qty: 240 ML | Refills: 0 | Status: SHIPPED | OUTPATIENT
Start: 2022-01-31 | End: 2022-03-01

## 2022-01-31 RX ORDER — SODIUM CHLORIDE 5 %
OINTMENT (GRAM) OPHTHALMIC (EYE)
COMMUNITY
End: 2022-03-01

## 2022-01-31 NOTE — ASSESSMENT & PLAN NOTE
Patient still has lingering cough, she has no other significant symptoms, physical exam reassuring.  I would not recommend repeating x-ray yet, if symptoms are not resolving in 2 weeks, I did recommend we do this at that time.  I will send in [facility] DM, see if this provides the patient with any additional relief without the jitteriness.  Patient agrees to this plan for now.  If not doing better in the next few days with medication, did advise her to call and let us know.

## 2022-02-08 ENCOUNTER — PREP FOR SURGERY (OUTPATIENT)
Dept: OTHER | Facility: HOSPITAL | Age: 62
End: 2022-02-08

## 2022-02-08 ENCOUNTER — OFFICE VISIT (OUTPATIENT)
Dept: SURGERY | Facility: CLINIC | Age: 62
End: 2022-02-08

## 2022-02-08 VITALS — HEIGHT: 67 IN | RESPIRATION RATE: 17 BRPM | WEIGHT: 190 LBS | HEART RATE: 108 BPM | BODY MASS INDEX: 29.82 KG/M2

## 2022-02-08 DIAGNOSIS — Z86.010 HISTORY OF COLONIC POLYPS: ICD-10-CM

## 2022-02-08 DIAGNOSIS — K62.5 BRBPR (BRIGHT RED BLOOD PER RECTUM): ICD-10-CM

## 2022-02-08 DIAGNOSIS — K21.9 GERD WITHOUT ESOPHAGITIS: Primary | ICD-10-CM

## 2022-02-08 DIAGNOSIS — K21.9 GASTROESOPHAGEAL REFLUX DISEASE WITHOUT ESOPHAGITIS: Primary | ICD-10-CM

## 2022-02-08 DIAGNOSIS — Z87.19 HISTORY OF BARRETT'S ESOPHAGUS: ICD-10-CM

## 2022-02-08 PROCEDURE — 99213 OFFICE O/P EST LOW 20 MIN: CPT | Performed by: NURSE PRACTITIONER

## 2022-02-08 RX ORDER — SODIUM CHLORIDE 0.9 % (FLUSH) 0.9 %
3 SYRINGE (ML) INJECTION EVERY 12 HOURS SCHEDULED
Status: CANCELLED | OUTPATIENT
Start: 2022-02-08

## 2022-02-08 RX ORDER — SODIUM CHLORIDE 0.9 % (FLUSH) 0.9 %
10 SYRINGE (ML) INJECTION AS NEEDED
Status: CANCELLED | OUTPATIENT
Start: 2022-02-08

## 2022-02-08 RX ORDER — POLYETHYLENE GLYCOL 3350 17 G/17G
POWDER, FOR SOLUTION ORAL
Qty: 238 PACKET | Refills: 0 | Status: SHIPPED | OUTPATIENT
Start: 2022-02-08 | End: 2022-06-10

## 2022-02-08 NOTE — PROGRESS NOTES
Chief Complaint: Colonoscopy (consult)    Subjective      EGD and colonoscopy consultation       History of Present Illness  Shanthi Akers is a 61 y.o. female presents to [facility] for EGD and colonoscopy consultation.    Patient presents today on referral from Charo Barragan for an EGD and colonoscopy consultation.    Patient reports heartburn indigestion despite taking omeprazole daily.  Reports she has with breakthrough symptoms at night.    Denies any dysphagia.  Denies any epigastric pain.    Admits to bright red blood per rectum with some clots after having bowel movements.    Denies any abdominal pain.    Admits to a change in her bowel habits with narrow pencillike stools.    Admits to a history of colonic polyps.    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    2/21: EGD & Colonoscopy (Sp): Gastritis; reflux esophagitis-negative for intestinal metaplasia, dysplasia and malignancy.; Ascending - tubular adenoma; Transverse - tubular adenoma.    3/19: EGD & Colonoscopy (sp): Rogel's; transverse - tubular adenoma; Rectum - 2 tubulovillous adenomas.    4/14: EGD (Maria Ines): Fundic gland polyp; esophogeal stricture with dilation; Rogel's.    8/13: Colonoscopy (Maria Ines): rectum - Tubulovillous adenomas.    5/13: Colonoscopy (Maria Ines): Transverse - tubulovillous adenoma; Rectum - tubular adenoma.   Objective     Past Medical History:   Diagnosis Date   • Asthma    • Broken bones    • Chronic allergic rhinitis    • Depression with anxiety    • Essential hypertension    • GERD (gastroesophageal reflux disease)    • Hyperlipidemia    • Migraine headache    • Psychiatric disorder    • Seasonal allergies    • Shortness of breath    • Sinus trouble        Past Surgical History:   Procedure Laterality Date   • COLON SURGERY     • ENDOMETRIAL ABLATION     • ENDOSCOPY     • HAMMER TOE REPAIR Left    • [facility] NEUROMA EXCISION Right        Outpatient Medications Marked  as Taking for the 2/8/22 encounter (Office Visit) with Alis CoelloAMANDA   Medication Sig Dispense Refill   • Advair Diskus 500-50 MCG/DOSE DISKUS INHALE ONE PUFF BY MOUTH TWICE A DAY IN THE MORNING AND EVENING APPROXIMATELY 12 HOURS APART 60 each 0   • albuterol sulfate  (90 Base) MCG/ACT inhaler Inhale 2 puffs Every 6 (Six) Hours As Needed for Wheezing. 18 g 0   • aspirin (aspirin) 81 MG EC tablet Take 81 mg by mouth Daily.     • atorvastatin (LIPITOR) 20 MG tablet Take 1 tablet by mouth Daily. 90 tablet 1   • lisinopril (PRINIVIL,ZESTRIL) 30 MG tablet Take 1 tablet by mouth Daily. 30 tablet 1   • montelukast (SINGULAIR) 10 MG tablet Take 1 tablet by mouth Daily. 90 tablet 1   • omeprazole (priLOSEC) 40 MG capsule TAKE ONE CAPSULE BY MOUTH DAILY BEFORE A MEAL 90 capsule 0   • PARoxetine (Paxil) 40 MG tablet Take 1 tablet by mouth Every Morning. 90 tablet 1   • potassium chloride (K-DUR,KLOR-CON) 20 MEQ CR tablet Take 20 mEq by mouth Daily.     • VITAMIN D, CHOLECALCIFEROL, PO Take  by mouth.         Allergies   Allergen Reactions   • Amoxicillin-Pot Clavulanate GI Intolerance   • Esomeprazole Hives   • Metronidazole Hives and Itching   • Tetracycline Other (See Comments) and Rash     Skin peeling on palms and feet          Family History   Problem Relation Age of Onset   • Heart failure Mother    • Hyperlipidemia Mother    • Hypertension Mother    • Rheumatic fever Mother    • Stroke Mother    • Heart attack Mother    • Melanoma Brother        Social History     Socioeconomic History   • Marital status:    Tobacco Use   • Smoking status: Never Smoker   • Smokeless tobacco: Never Used   Vaping Use   • Vaping Use: Never used   Substance and Sexual Activity   • Alcohol use: Yes     Alcohol/week: 8.0 standard drinks     Types: 4 Glasses of wine, 4 Standard drinks or equivalent per week     Comment: WINE ALCOHOL   • Drug use: Never   • Sexual activity: Defer       Review of Systems   Constitutional:  "Negative for chills and fever.   HENT: Negative for trouble swallowing.    Gastrointestinal: Positive for anal bleeding, GERD and indigestion. Negative for abdominal distention, abdominal pain, constipation, diarrhea, nausea, rectal pain and vomiting.        Vital Signs:   Pulse 108   Resp 17   Ht 170.2 cm (67\")   Wt 86.2 kg (190 lb)   BMI 29.76 kg/m²      Physical Exam  Constitutional:       Appearance: Normal appearance.   HENT:      Head: Normocephalic.   Cardiovascular:      Rate and Rhythm: Normal rate.   Pulmonary:      Effort: Pulmonary effort is normal.   Abdominal:      General: Abdomen is flat.      Palpations: Abdomen is soft.   Skin:     General: Skin is warm and dry.   Neurological:      General: No focal deficit present.      Mental Status: She is alert and oriented to person, place, and time.   Psychiatric:         Mood and Affect: Mood normal.         Thought Content: Thought content normal.          Result Review :              []  Laboratory  []  Radiology  []  Pathology  []  Microbiology  []  EKG/Telemetry   []  Cardiology/Vascular   []  Old records       Assessment and Plan    Diagnoses and all orders for this visit:    1. Gastroesophageal reflux disease without esophagitis (Primary)    2. BRBPR (bright red blood per rectum)    3. History of Rogel's esophagus    4. History of colonic polyps    Other orders  -     polyethylene glycol (MIRALAX) 17 g packet; Take as directed.  Instructions given in office.  Dispense: 238 g bottle  Dispense: 238 packet; Refill: 0    orange prep    Follow Up   Return for Scheduled EGD and colonoscopy with Dr. Snowden on 5/2/2022 at [facility].     Hospital arrival time 6 AM    Possible risks/complications, benefits, and alternatives to surgical or invasive procedure have been explained to patient and/or legal guardian.     Patient has been evaluated and can tolerate anesthesia and/or sedation. Risks, benefits, and alternatives to anesthesia and " sedation have been explained to patient and/or legal guardian.  Patient verbalizes understanding is willing to proceed with the above plan.    Patient was given instructions and counseling regarding her condition or for health maintenance advice. Please see specific information pulled into the AVS if appropriate.

## 2022-02-09 RX ORDER — LISINOPRIL 30 MG/1
TABLET ORAL
Qty: 30 TABLET | Refills: 0 | Status: SHIPPED | OUTPATIENT
Start: 2022-02-09 | End: 2022-03-11

## 2022-02-22 ENCOUNTER — TELEPHONE (OUTPATIENT)
Dept: INTERNAL MEDICINE | Facility: CLINIC | Age: 62
End: 2022-02-22

## 2022-02-23 ENCOUNTER — TELEPHONE (OUTPATIENT)
Dept: UROLOGY | Facility: CLINIC | Age: 62
End: 2022-02-23

## 2022-02-23 NOTE — TELEPHONE ENCOUNTER
Check TYC book and see if he has had any cancellations. She is scheduled for a double. If not then we would have to ask to add her to a Friday afternoon for a sooner appt. Please look and try to arrange. If not call the patient and let her know that I will have to speak with [facility] about a sooner appt.

## 2022-02-23 NOTE — TELEPHONE ENCOUNTER
EGD/colonoscopy scheduled with Dr. Snowden 05/02.  Patient asking if it can be done sooner.    She said she told April she had rectal bleeding when she consulted.  Today, she passed a lot of bright red blood from the rectum, and she is concerned.    April, will you address?

## 2022-02-24 ENCOUNTER — TELEPHONE (OUTPATIENT)
Dept: SURGERY | Facility: CLINIC | Age: 62
End: 2022-02-24

## 2022-02-24 NOTE — TELEPHONE ENCOUNTER
Pt is ware that we will have to talk to Dr. Snowden. Pt stated yesterday that she had diarrhea and it was lime green and foul smell

## 2022-02-24 NOTE — TELEPHONE ENCOUNTER
lmom for the Pt to call me back. I left my hours on the machine. Dr. Snowden hasn't had any cx. So April will have to talk to him tomorrow.

## 2022-03-01 ENCOUNTER — OFFICE VISIT (OUTPATIENT)
Dept: INTERNAL MEDICINE | Facility: CLINIC | Age: 62
End: 2022-03-01

## 2022-03-01 VITALS
HEIGHT: 68 IN | SYSTOLIC BLOOD PRESSURE: 118 MMHG | OXYGEN SATURATION: 100 % | TEMPERATURE: 97.2 F | HEART RATE: 78 BPM | BODY MASS INDEX: 28.64 KG/M2 | WEIGHT: 189 LBS | DIASTOLIC BLOOD PRESSURE: 74 MMHG

## 2022-03-01 DIAGNOSIS — J12.82 PNEUMONIA DUE TO COVID-19 VIRUS: ICD-10-CM

## 2022-03-01 DIAGNOSIS — M25.551 HIP PAIN, RIGHT: ICD-10-CM

## 2022-03-01 DIAGNOSIS — U07.1 PNEUMONIA DUE TO COVID-19 VIRUS: ICD-10-CM

## 2022-03-01 DIAGNOSIS — R05.9 COUGH: Primary | ICD-10-CM

## 2022-03-01 PROCEDURE — 99213 OFFICE O/P EST LOW 20 MIN: CPT | Performed by: NURSE PRACTITIONER

## 2022-03-01 RX ORDER — PREDNISONE 20 MG/1
40 TABLET ORAL DAILY
Qty: 10 TABLET | Refills: 0 | Status: SHIPPED | OUTPATIENT
Start: 2022-03-01 | End: 2022-03-06

## 2022-03-01 NOTE — ASSESSMENT & PLAN NOTE
Discussed options for management, including imaging, PT. Patient would prefer to proceed with steroids as previously discussed and further evaluate at visit in one week. Will consider xray and referral to PT at that time, will continue to monitor.

## 2022-03-01 NOTE — PROGRESS NOTES
"Chief Complaint  Cough (was rapid tested for covid again on 02/24/2022 at Urgent Care. rx for tesslon pearls, nasal spray, cough syrup almost out. Cough continued and interrupting sleep. ) and Generalized Body Aches (taking magnesium for cramping)    Subjective          Shanthi Akers presents to [facility]  Patient diagnosed with COVID 12/2021, progressed to COVID pneumonia. She was finally able to return to work over a month later, states that cough has improved but has not resolved since initial illness. Patient with chest CT scan 12/2021, ruled out PE but confirmed COVID pneumonia. Xray two months ago showed residual lung changes from COVID19. Patient with asthma, currently managed with Advair. She did use albuterol PRN during illness but has not used recently. Patient states the cough is constant but varies in severity. Often seems worse at night, is not productive. Patient states all other symptoms have resolved. Denies fever, shortness of breath, wheezing. Patient was recently evaluated in Urgent Care and prescribed a nasal spray and Tessalon Perles, these have not improved symptoms. She is curious if cough could be caused by lisinopril.     Patient also continues to have right hip pain, intermittent numbness/tingling down the right leg. Denies weakness, loss of bowel/bladder function. Denies injury, edema.      Objective   Vital Signs:   /74   Pulse 78   Temp 97.2 °F (36.2 °C)   Ht 171.5 cm (67.5\")   Wt 85.7 kg (189 lb)   SpO2 100%   BMI 29.16 kg/m²     Physical Exam  Constitutional:       Appearance: Normal appearance. She is normal weight.   HENT:      Head: Normocephalic and atraumatic.      Nose: Nose normal.      Mouth/Throat:      Mouth: Mucous membranes are moist.      Pharynx: Oropharynx is clear.   Eyes:      Extraocular Movements: Extraocular movements intact.      Conjunctiva/sclera: Conjunctivae normal.      Pupils: Pupils are equal, " round, and reactive to light.   Cardiovascular:      Rate and Rhythm: Normal rate and regular rhythm.      Heart sounds: Normal heart sounds.   Pulmonary:      Effort: Pulmonary effort is normal.      Breath sounds: Normal breath sounds.   Skin:     General: Skin is warm and dry.   Neurological:      General: No focal deficit present.      Mental Status: She is alert and oriented to person, place, and time.   Psychiatric:         Mood and Affect: Mood normal.         Behavior: Behavior normal.         Thought Content: Thought content normal.        Result Review :                 Assessment and Plan    Diagnoses and all orders for this visit:    1. Cough (Primary)  Assessment & Plan:  Exam reassuring, lung sounds clear, O2 sat 100%. Will treat as secondary to asthma, likely residual from COVID19. Will trial short course of systemic steroids. She is to continue ICS and PRN albuterol. She is aware of risk of steroids, including increased blood pressure, blood sugar, appetite, mood changes, weight gain. Will schedule for repeat chest CT scan to look for chronic lung changes. She will follow up in one week as already scheduled. If symptoms have worsened or persist will consider adjusting inhaler to ICS/LABA and/or referral to pulmonology. Unlikely secondary to ACE as symptoms started and have continued since COVID19 infection. She will seek medical attention immediately with worsening cough, shortness of breath, wheezing, fever. Will determine if further intervention is warranted based on results of imaging.     Orders:  -     CT Chest With Contrast; Future    2. Pneumonia due to COVID-19 virus  -     CT Chest With Contrast; Future    3. Hip pain, right  Assessment & Plan:  Discussed options for management, including imaging, PT. Patient would prefer to proceed with steroids as previously discussed and further evaluate at visit in one week. Will consider xray and referral to PT at that time, will continue to monitor.        Other orders  -     predniSONE (DELTASONE) 20 MG tablet; Take 2 tablets by mouth Daily for 5 days.  Dispense: 10 tablet; Refill: 0      Follow Up   Return for Next scheduled follow up.  Patient was given instructions and counseling regarding her condition or for health maintenance advice. Please see specific information pulled into the AVS if appropriate.

## 2022-03-01 NOTE — ASSESSMENT & PLAN NOTE
Exam reassuring, lung sounds clear, O2 sat 100%. Will treat as secondary to asthma, likely residual from COVID19. Will trial short course of systemic steroids. She is to continue ICS and PRN albuterol. She is aware of risk of steroids, including increased blood pressure, blood sugar, appetite, mood changes, weight gain. Will schedule for repeat chest CT scan to look for chronic lung changes. She will follow up in one week as already scheduled. If symptoms have worsened or persist will consider adjusting inhaler to ICS/LABA and/or referral to pulmonology. Unlikely secondary to ACE as symptoms started and have continued since COVID19 infection. She will seek medical attention immediately with worsening cough, shortness of breath, wheezing, fever. Will determine if further intervention is warranted based on results of imaging.

## 2022-03-08 ENCOUNTER — OFFICE VISIT (OUTPATIENT)
Dept: INTERNAL MEDICINE | Facility: CLINIC | Age: 62
End: 2022-03-08

## 2022-03-08 VITALS
SYSTOLIC BLOOD PRESSURE: 127 MMHG | DIASTOLIC BLOOD PRESSURE: 79 MMHG | BODY MASS INDEX: 29.16 KG/M2 | TEMPERATURE: 97.6 F | HEART RATE: 93 BPM | OXYGEN SATURATION: 100 % | WEIGHT: 189 LBS

## 2022-03-08 DIAGNOSIS — R73.09 ELEVATED GLUCOSE: ICD-10-CM

## 2022-03-08 DIAGNOSIS — Z12.4 ENCOUNTER FOR PAPANICOLAOU SMEAR FOR CERVICAL CANCER SCREENING: ICD-10-CM

## 2022-03-08 DIAGNOSIS — E55.9 VITAMIN D DEFICIENCY: ICD-10-CM

## 2022-03-08 DIAGNOSIS — F33.0 MAJOR DEPRESSIVE DISORDER, RECURRENT, MILD: ICD-10-CM

## 2022-03-08 DIAGNOSIS — K21.9 GERD WITHOUT ESOPHAGITIS: ICD-10-CM

## 2022-03-08 DIAGNOSIS — L98.9 SKIN LESIONS: ICD-10-CM

## 2022-03-08 DIAGNOSIS — Z12.31 VISIT FOR SCREENING MAMMOGRAM: ICD-10-CM

## 2022-03-08 DIAGNOSIS — J45.20 MILD INTERMITTENT ASTHMA WITHOUT COMPLICATION: ICD-10-CM

## 2022-03-08 DIAGNOSIS — I10 ESSENTIAL HYPERTENSION: ICD-10-CM

## 2022-03-08 DIAGNOSIS — R23.2 HOT FLASHES: ICD-10-CM

## 2022-03-08 DIAGNOSIS — F41.9 ANXIETY: ICD-10-CM

## 2022-03-08 DIAGNOSIS — E87.6 HYPOKALEMIA: ICD-10-CM

## 2022-03-08 DIAGNOSIS — M25.551 HIP PAIN, RIGHT: ICD-10-CM

## 2022-03-08 DIAGNOSIS — E78.5 HYPERLIPIDEMIA, UNSPECIFIED HYPERLIPIDEMIA TYPE: Primary | ICD-10-CM

## 2022-03-08 PROBLEM — R05.9 COUGH: Status: RESOLVED | Noted: 2022-01-03 | Resolved: 2022-03-08

## 2022-03-08 LAB
25(OH)D3 SERPL-MCNC: 50.2 NG/ML (ref 30–100)
ALBUMIN SERPL-MCNC: 4.4 G/DL (ref 3.5–5.2)
ALBUMIN/GLOB SERPL: 1.6 G/DL
ALP SERPL-CCNC: 128 U/L (ref 39–117)
ALT SERPL W P-5'-P-CCNC: 28 U/L (ref 1–33)
ANION GAP SERPL CALCULATED.3IONS-SCNC: 11 MMOL/L (ref 5–15)
AST SERPL-CCNC: 30 U/L (ref 1–32)
BASOPHILS # BLD AUTO: 0.09 10*3/MM3 (ref 0–0.2)
BASOPHILS NFR BLD AUTO: 0.8 % (ref 0–1.5)
BILIRUB SERPL-MCNC: 0.5 MG/DL (ref 0–1.2)
BUN SERPL-MCNC: 19 MG/DL (ref 6–20)
BUN/CREAT SERPL: 20.4 (ref 7–25)
CALCIUM SPEC-SCNC: 10 MG/DL (ref 8.6–10.5)
CANDIDA SPECIES: NEGATIVE
CHLORIDE SERPL-SCNC: 98 MMOL/L (ref 98–107)
CHOLEST SERPL-MCNC: 217 MG/DL (ref 0–200)
CO2 SERPL-SCNC: 27 MMOL/L (ref 22–29)
CREAT SERPL-MCNC: 0.93 MG/DL (ref 0.57–1)
DEPRECATED RDW RBC AUTO: 40.8 FL (ref 37–54)
EGFRCR SERPLBLD CKD-EPI 2021: 70.9 ML/MIN/1.73
EOSINOPHIL # BLD AUTO: 0.27 10*3/MM3 (ref 0–0.4)
EOSINOPHIL NFR BLD AUTO: 2.4 % (ref 0.3–6.2)
ERYTHROCYTE [DISTWIDTH] IN BLOOD BY AUTOMATED COUNT: 12.5 % (ref 12.3–15.4)
GARDNERELLA VAGINALIS: POSITIVE
GLOBULIN UR ELPH-MCNC: 2.8 GM/DL
GLUCOSE SERPL-MCNC: 106 MG/DL (ref 65–99)
HCT VFR BLD AUTO: 41.3 % (ref 34–46.6)
HDLC SERPL-MCNC: 51 MG/DL (ref 40–60)
HGB BLD-MCNC: 13.6 G/DL (ref 12–15.9)
IMM GRANULOCYTES # BLD AUTO: 0.2 10*3/MM3 (ref 0–0.05)
IMM GRANULOCYTES NFR BLD AUTO: 1.8 % (ref 0–0.5)
LDLC SERPL CALC-MCNC: 108 MG/DL (ref 0–100)
LDLC/HDLC SERPL: 1.91 {RATIO}
LYMPHOCYTES # BLD AUTO: 2.38 10*3/MM3 (ref 0.7–3.1)
LYMPHOCYTES NFR BLD AUTO: 21.2 % (ref 19.6–45.3)
MCH RBC QN AUTO: 29.4 PG (ref 26.6–33)
MCHC RBC AUTO-ENTMCNC: 32.9 G/DL (ref 31.5–35.7)
MCV RBC AUTO: 89.4 FL (ref 79–97)
MONOCYTES # BLD AUTO: 0.85 10*3/MM3 (ref 0.1–0.9)
MONOCYTES NFR BLD AUTO: 7.6 % (ref 5–12)
NEUTROPHILS NFR BLD AUTO: 66.2 % (ref 42.7–76)
NEUTROPHILS NFR BLD AUTO: 7.44 10*3/MM3 (ref 1.7–7)
NRBC BLD AUTO-RTO: 0 /100 WBC (ref 0–0.2)
PLATELET # BLD AUTO: 417 10*3/MM3 (ref 140–450)
PMV BLD AUTO: 9.9 FL (ref 6–12)
POTASSIUM SERPL-SCNC: 4.5 MMOL/L (ref 3.5–5.2)
PROT SERPL-MCNC: 7.2 G/DL (ref 6–8.5)
RBC # BLD AUTO: 4.62 10*6/MM3 (ref 3.77–5.28)
SODIUM SERPL-SCNC: 136 MMOL/L (ref 136–145)
T VAGINALIS DNA VAG QL PROBE+SIG AMP: NEGATIVE
TRIGL SERPL-MCNC: 342 MG/DL (ref 0–150)
TSH SERPL DL<=0.05 MIU/L-ACNC: 1.29 UIU/ML (ref 0.27–4.2)
VLDLC SERPL-MCNC: 58 MG/DL (ref 5–40)
WBC NRBC COR # BLD: 11.23 10*3/MM3 (ref 3.4–10.8)

## 2022-03-08 PROCEDURE — 80050 GENERAL HEALTH PANEL: CPT | Performed by: NURSE PRACTITIONER

## 2022-03-08 PROCEDURE — G0123 SCREEN CERV/VAG THIN LAYER: HCPCS | Performed by: NURSE PRACTITIONER

## 2022-03-08 PROCEDURE — 99396 PREV VISIT EST AGE 40-64: CPT | Performed by: NURSE PRACTITIONER

## 2022-03-08 PROCEDURE — 87660 TRICHOMONAS VAGIN DIR PROBE: CPT | Performed by: NURSE PRACTITIONER

## 2022-03-08 PROCEDURE — 82306 VITAMIN D 25 HYDROXY: CPT | Performed by: NURSE PRACTITIONER

## 2022-03-08 PROCEDURE — 87510 GARDNER VAG DNA DIR PROBE: CPT | Performed by: NURSE PRACTITIONER

## 2022-03-08 PROCEDURE — 83036 HEMOGLOBIN GLYCOSYLATED A1C: CPT | Performed by: NURSE PRACTITIONER

## 2022-03-08 PROCEDURE — 87480 CANDIDA DNA DIR PROBE: CPT | Performed by: NURSE PRACTITIONER

## 2022-03-08 PROCEDURE — 80061 LIPID PANEL: CPT | Performed by: NURSE PRACTITIONER

## 2022-03-08 RX ORDER — MUPIROCIN CALCIUM 20 MG/G
1 CREAM TOPICAL 3 TIMES DAILY
Qty: 15 G | Refills: 0 | Status: SHIPPED | OUTPATIENT
Start: 2022-03-08 | End: 2022-03-11

## 2022-03-08 NOTE — ASSESSMENT & PLAN NOTE
Continue statin. Most recent , repeat lipid panel today. Will determine if medication adjustments are warranted based on results.

## 2022-03-08 NOTE — ASSESSMENT & PLAN NOTE
Xray today. Will determine further plan of care based on results. Will likely refer to PT and/or orthopedics for further evaluation. Continue conservative care with ice/heat, stretching as tolerated. Hip exercises provided.

## 2022-03-08 NOTE — PROGRESS NOTES
"Subjective   History of Present Illness    Shanthi Akers is a 61 y.o. female who presents for annual exam.    Obstetric History:  OB History    No obstetric history on file.        Menstrual History:     No LMP recorded. Patient has had an ablation.     Hip pain-  Did not resolve with steroid. Patient would like to proceed with imaging today. Pain radiates from right hip into anterior thigh. Denies known injury.     Vitamin D deficiency-  Managed with oral supplement.    HTN-  Managed with lisinopril. She has not been checking her blood pressure at home. Denies chest pain, blurry vision, headache. She has had some leg swelling since starting her new job. Patient stands all day, states swelling is present but minimal.     Anxiety/depression-  Well controlled with Paxil. Denies SI/HI.     Asthma-  Cough has resolved with short course of systemic steroids. She continues to take her Advair and Singulair, has not had to use the albuterol.     GERD-  Well controlled with omeprazole. Patient admits she has had some blood in her stool, is not aware of hemorrhoids, states GI is aware of this and she is scheduled for colonoscopy/endoscopy 4/2022.    Hypokalemia-  Managed with potassium supplement.     HLD-  Most recent , statin was started at that time. Well tolerated, denies leg cramping. Due for repeat labs.    Hot flashes-  Patient reports increase in hot flashes, she feels like she is going through \"the change\". States she is sweating often and has noticed small bumps on her buttocks.        Sexual History:         Current contraception: none  History of abnormal Pap smear: no  Received Gardasil immunization: no  Family history of uterine or ovarian cancer: no  Family History of cervical cancer: no  Family History of colon cancer/colon polyps: no  Regular self breast exam: yes  History of abnormal mammogram: yes - cysts  Family history of breast cancer: no  History of abnormal lipids: yes - 185    The following " portions of the patient's history were reviewed and updated as appropriate: allergies, current medications, past family history, past medical history, past social history, past surgical history and problem list.      Objective     Physical Exam  Exam conducted with a chaperone present (Jonna GUTIERREZ MA).   Constitutional:       Appearance: Normal appearance. She is normal weight.   HENT:      Head: Normocephalic and atraumatic.      Nose: Nose normal.      Mouth/Throat:      Mouth: Mucous membranes are moist.      Pharynx: Oropharynx is clear.   Eyes:      Extraocular Movements: Extraocular movements intact.      Conjunctiva/sclera: Conjunctivae normal.      Pupils: Pupils are equal, round, and reactive to light.   Cardiovascular:      Rate and Rhythm: Normal rate and regular rhythm.      Heart sounds: Normal heart sounds.   Pulmonary:      Effort: Pulmonary effort is normal.      Breath sounds: Normal breath sounds.   Chest:   Breasts:      Right: Normal.      Left: Normal.       Genitourinary:     Cervix: Normal.      Uterus: Normal.       Adnexa: Right adnexa normal and left adnexa normal.      Rectum: Normal.      Comments: Right lower buttocks with erythematous papules, appear to have previously been pustular in nature; without induration, warmth, streaking, current discharge   Skin:     General: Skin is warm and dry.   Neurological:      General: No focal deficit present.      Mental Status: She is alert and oriented to person, place, and time.   Psychiatric:         Mood and Affect: Mood normal.         Behavior: Behavior normal.         Thought Content: Thought content normal.           /79   Pulse 93   Temp 97.6 °F (36.4 °C)   Wt 85.7 kg (189 lb)   SpO2 100%   BMI 29.16 kg/m²       Assessment/Plan   Diagnoses and all orders for this visit:    1. Hyperlipidemia, unspecified hyperlipidemia type (Primary)  Assessment & Plan:  Continue statin. Most recent , repeat lipid panel today. Will  determine if medication adjustments are warranted based on results.    Orders:  -     Lipid Panel    2. Essential hypertension  Assessment & Plan:  Well controlled, continue lisinopril. Encouraged patient to continue to monitor blood pressure at home and to call or return to clinic with consistent elevations greater than 130/80. Labs in clinic today to include CBC, CMP.      Orders:  -     Comprehensive Metabolic Panel  -     CBC & Differential  -     TSH    3. Vitamin D deficiency  Assessment & Plan:  Continue oral supplement, vitamin D level today.    Orders:  -     Vitamin D 25 Hydroxy    4. Visit for screening mammogram  Comments:  Mammogram ordered.  Orders:  -     Mammo Screening Digital Tomosynthesis Bilateral With CAD; Future    5. Encounter for Papanicolaou smear for cervical cancer screening  Assessment & Plan:  Exam without concern. Discussed preventative care with routine PAP smears and mammogram. Will determine further intervention based on results of PAP smear.    Orders:  -     IGP,rfx Aptima HPV All Pth  -     Gardnerella vaginalis, Trichomonas vaginalis, Candida albicans, DNA - Swab, Vagina    6. Hip pain, right  Assessment & Plan:  Xray today. Will determine further plan of care based on results. Will likely refer to PT and/or orthopedics for further evaluation. Continue conservative care with ice/heat, stretching as tolerated. Hip exercises provided.    Orders:  -     XR Hip With or Without Pelvis 2 - 3 View Right    7. Anxiety    8. Major depressive disorder, recurrent, mild (HCC)  Assessment & Plan:  Anxiety/depression well controlled, continue Paxil. Encouraged patient to continue to monitor. She will seek medical attention immediately if she feels that her mental health is deteriorating. Denies SI/HI. Will continue to monitor.         9. Hypokalemia  Assessment & Plan:  Continue potassium supplement, CMP today.      10. Mild intermittent asthma without complication  Assessment & Plan:  Cough  resolved. Continue Advair, Singulair, PRN albuterol. Will continue to monitor.         11. GERD without esophagitis  Assessment & Plan:  Well controlled, continue omeprazole. Follow with GI as scheduled for endoscopy.       12. Skin lesions  Comments:  Mupirocin to pharmacy. She will call or return to clinic with concerns.    13. Hot flashes  Assessment & Plan:  Will check routine labs and determine if further intervention is warranted based on results.      Other orders  -     mupirocin (Bactroban) 2 % cream; Apply 1 application topically to the appropriate area as directed 3 (Three) Times a Day.  Dispense: 15 g; Refill: 0      Await pap smear results. Follow up in three months

## 2022-03-08 NOTE — EXTERNAL PATIENT INSTRUCTIONS
Patient Education   Table of Contents       Hip Exercises     To view videos and all your education online visit,   [email or web address]   or scan this QR code with your smartphone.                  Hip Exercises     Ask your health care provider which exercises are safe for you. Do exercises exactly as told by your health care provider and adjust them as directed. It is normal to feel mild stretching, pulling, tightness, or discomfort as you do these exercises. Stop right away if you feel sudden pain or your pain gets worse. Do not  begin these exercises until told by your health care provider.   Stretching and range-of-motion exercises   These exercises warm up your muscles and joints and improve the movement and flexibility of your hip. These exercises also help to relieve pain, numbness, and tingling. You may be asked to limit your range of motion if you had a hip replacement. Talk to your health care provider about these restrictions.   Hamstrings, supine          Lie on your back (supine position).       Loop a belt or towel over the ball of your left / right foot. The ball of your foot is on the walking surface, right under your toes.      Straighten your left / right knee and slowly pull on the belt or towel to raise your leg until you feel a gentle stretch behind your knee (hamstring).      Do not  let your knee bend while you do this.       Keep your other leg flat on the floor.       Hold this position for __________ seconds.       Slowly return your leg to the starting position.   Repeat __________ times. Complete this exercise __________ times a day.   Hip rotation          Lie on your back on a firm surface.       With your left / right hand, gently pull your left / right knee toward the shoulder that is on the same side of the body. Stop when your knee is pointing toward the ceiling.       Hold your left / right ankle with your other hand.      Keeping your knee steady, gently pull your  left / right ankle toward your other shoulder until you feel a stretch in your buttocks.       Keep your hips and shoulders firmly planted while you do this stretch.       Hold this position for __________ seconds.   Repeat __________ times. Complete this exercise __________ times a day.     Seated stretch       This exercise is sometimes called hamstrings and adductors stretch.     Sit on the floor with your legs stretched wide. Keep your knees straight during this exercise.       Keeping your head and back in a straight line, bend at your waist to reach for your left foot (position A). You should feel a stretch in your right inner thigh (adductors).       Hold this position for __________ seconds. Then slowly return to the upright position.       Keeping your head and back in a straight line, bend at your waist to reach forward (position B). You should feel a stretch behind both of your thighs and knees (hamstrings).       Hold this position for __________ seconds. Then slowly return to the upright position.       Keeping your head and back in a straight line, bend at your waist to reach for your right foot (position C). You should feel a stretch in your left inner thigh (adductors).       Hold this position for __________ seconds. Then slowly return to the upright position.   Repeat __________ times. Complete this exercise __________ times a day.     Lunge       This exercise stretches the muscles of the hip (hip flexors).     Place your left / right knee on the floor and bend your other knee so that is directly over your ankle. You should be half-kneeling.       Keep good posture with your head over your shoulders.       Tighten your buttocks to point your tailbone downward. This will prevent your back from arching too much.      You should feel a gentle stretch in the front of your left / right thigh and hip. If you do not feel a stretch, slide your other foot forward slightly and then slowly lunge forward with  your chest up until your knee once again lines up over your ankle.       Make sure your tailbone continues to point downward.       Hold this position for __________ seconds.       Slowly return to the starting position.   Repeat __________ times. Complete this exercise __________ times a day.       Strengthening exercises   These exercises build strength and endurance in your hip. Endurance is the ability to use your muscles for a long time, even after they get tired.   Bridge       This exercise strengthens the muscles of your hip (hip extensors).     Lie on your back on a firm surface with your knees bent and your feet flat on the floor.      Tighten your buttocks muscles and lift your bottom off the floor until the trunk of your body and your hips are level with your thighs.      Do not  arch your back.       You should feel the muscles working in your buttocks and the back of your thighs. If you do not feel these muscles, slide your feet 1?2 inches (2.5?5 cm) farther away from your buttocks.       Hold this position for __________ seconds.       Slowly lower your hips to the starting position.       Let your muscles relax completely between repetitions.   Repeat __________ times. Complete this exercise __________ times a day.   Straight leg raises, side-lying       This exercise strengthens the muscles that move the hip joint away from the center of the body (hip abductors).     Lie on your side with your left / right leg in the top position. Lie so your head, shoulder, hip, and knee line up. You may bend your bottom knee slightly to help you balance.       Roll your hips slightly forward, so your hips are stacked directly over each other and your left / right knee is facing forward.      Leading with your heel, lift your top leg 4?6 inches (10?15 cm). You should feel the muscles in your top hip lifting.      Do not  let your foot drift forward.      Do not  let your knee roll toward the ceiling.       Hold  this position for __________ seconds.       Slowly return to the starting position.       Let your muscles relax completely between repetitions.   Repeat __________ times. Complete this exercise __________ times a day.     Straight leg raises, side-lying       This exercise strengthens the muscles that move the hip joint toward the center of the body (hip adductors).     Lie on your side with your left / right leg in the bottom position. Lie so your head, shoulder, hip, and knee line up. You may place your upper foot in front to help you balance.       Roll your hips slightly forward, so your hips are stacked directly over each other and your left / right knee is facing forward.       Tense the muscles in your inner thigh and lift your bottom leg 4?6 inches (10?15 cm).       Hold this position for __________ seconds.       Slowly return to the starting position.       Let your muscles relax completely between repetitions.   Repeat __________ times. Complete this exercise __________ times a day.     Straight leg raises, supine       This exercise strengthens the muscles in the front of your thigh (quadriceps).     Lie on your back (supine position) with your left / right leg extended and your other knee bent.       Tense the muscles in the front of your left / right thigh. You should see your kneecap slide up or see increased dimpling just above your knee.       Keep these muscles tight as you raise your leg 4?6 inches (10?15 cm) off the floor. Do not  let your knee bend.       Hold this position for __________ seconds.       Keep these muscles tense as you lower your leg.       Relax the muscles slowly and completely between repetitions.   Repeat __________ times. Complete this exercise __________ times a day.     Hip abductors, standing    This exercise strengthens the muscles that move the leg and hip joint away from the center of the body (hip abductors).     Tie one end of a rubber exercise band or tubing to a  secure surface, such as a chair, table, or pole.       Loop the other end of the band or tubing around your left / right ankle.       Keeping your ankle with the band or tubing directly opposite the secured end, step away until there is tension in the tubing or band. Hold on to a chair, table, or pole as needed for balance.      Lift your left / right leg out to your side. While you do this:       Keep your back upright.       Keep your shoulders over your hips.       Keep your toes pointing forward.       Make sure to use your hip muscles to slowly lift your leg. Do not  tip your body or forcefully lift your leg.       Hold this position for __________ seconds.       Slowly return to the starting position.   Repeat __________ times. Complete this exercise __________ times a day.   Squats    This exercise strengthens the muscles in the front of your thigh (quadriceps).      a door frame so your feet and knees are in line with the frame. You may place your hands on the frame for balance.      Slowly bend your knees and lower your hips like you are going to sit in a chair.       Keep your lower legs in a straight-up-and-down position.      Do not  let your hips go lower than your knees.      Do not  bend your knees lower than told by your health care provider.       If your hip pain increases, do not  bend as low.       Hold this position for ___________ seconds.       Slowly push with your legs to return to standing. Do not  use your hands to pull yourself to standing.   Repeat __________ times. Complete this exercise __________ times a day.     This information is not intended to replace advice given to you by your health care provider. Make sure you discuss any questions you have with your health care provider.     Document Released: 01/05/2007Document Revised: 07/23/2020Document Reviewed: 10/29/2019     Elsevier Patient Education ? 2022 [employer]

## 2022-03-08 NOTE — ASSESSMENT & PLAN NOTE
Anxiety/depression well controlled, continue Paxil. Encouraged patient to continue to monitor. She will seek medical attention immediately if she feels that her mental health is deteriorating. Denies SI/HI. Will continue to monitor.

## 2022-03-09 DIAGNOSIS — R73.09 ELEVATED GLUCOSE: ICD-10-CM

## 2022-03-09 DIAGNOSIS — E78.5 HYPERLIPIDEMIA, UNSPECIFIED HYPERLIPIDEMIA TYPE: ICD-10-CM

## 2022-03-09 DIAGNOSIS — D72.829 LEUKOCYTOSIS, UNSPECIFIED TYPE: Primary | ICD-10-CM

## 2022-03-09 DIAGNOSIS — M25.551 HIP PAIN, RIGHT: Primary | ICD-10-CM

## 2022-03-09 LAB — HBA1C MFR BLD: 5.8 % (ref 4.8–5.6)

## 2022-03-09 RX ORDER — CLINDAMYCIN PHOSPHATE 20 MG/G
1 CREAM VAGINAL NIGHTLY
Qty: 7 G | Refills: 0 | Status: SHIPPED | OUTPATIENT
Start: 2022-03-09 | End: 2022-03-16

## 2022-03-09 RX ORDER — ATORVASTATIN CALCIUM 40 MG/1
40 TABLET, FILM COATED ORAL DAILY
Qty: 90 TABLET | Refills: 1 | Status: SHIPPED | OUTPATIENT
Start: 2022-03-09 | End: 2023-02-15

## 2022-03-10 ENCOUNTER — TELEPHONE (OUTPATIENT)
Dept: INTERNAL MEDICINE | Facility: CLINIC | Age: 62
End: 2022-03-10

## 2022-03-10 NOTE — TELEPHONE ENCOUNTER
Caller: Shanthi Akers    Relationship: Self    Best call back number: [phone]    What medication are you requesting: DICLOFENAC    What are your current symptoms: PAIN IN LEG/HIP/THIGH      Have you had these symptoms before:    [x] Yes  [] No    Have you been treated for these symptoms before:   [x] Yes  [] No    If a prescription is needed, what is your preferred pharmacy and phone number: [facility] - [place], [place] - [address] [phone] [facility] [phone] FX     Additional notes: PATIENT CANNOT GET INTO THERAPY UNTIL A MONTH FROM NOW BUT WOULD LIKE TO GET MEDICATION FOR PAIN TO HOLD HER OVER UNTIL THEN.

## 2022-03-11 RX ORDER — LISINOPRIL 30 MG/1
TABLET ORAL
Qty: 90 TABLET | Refills: 0 | Status: SHIPPED | OUTPATIENT
Start: 2022-03-11 | End: 2022-06-22

## 2022-03-14 ENCOUNTER — TELEPHONE (OUTPATIENT)
Dept: INTERNAL MEDICINE | Facility: CLINIC | Age: 62
End: 2022-03-14

## 2022-03-14 LAB
CONV .: NORMAL
CYTOLOGIST CVX/VAG CYTO: NORMAL
CYTOLOGY CVX/VAG DOC CYTO: NORMAL
CYTOLOGY CVX/VAG DOC THIN PREP: NORMAL
DX ICD CODE: NORMAL
HIV 1 & 2 AB SER-IMP: NORMAL
OTHER STN SPEC: NORMAL
STAT OF ADQ CVX/VAG CYTO-IMP: NORMAL

## 2022-03-14 NOTE — TELEPHONE ENCOUNTER
----- Message from AMANDA Catherine sent at 3/14/2022  7:28 AM EDT -----  Please call patient with results as they have not reviewed them via [facility].

## 2022-03-14 NOTE — TELEPHONE ENCOUNTER
Caller: Shanthi Akers    Relationship: Self    Best call back number: [phone]    What is the best time to reach you: ANY TIME    Who are you requesting to speak with (clinical staff, provider,  specific staff member): [facility]    Do you know the name of the person who called: [facility]    What was the call regarding: PATIENT CALLED STATING SHE WAS RETURNING A CALL FROM [employer] WOULD LIKE CALL BACK TODAY    Do you require a callback: YES

## 2022-03-15 ENCOUNTER — HOSPITAL ENCOUNTER (OUTPATIENT)
Dept: CARDIOLOGY | Facility: HOSPITAL | Age: 62
Discharge: HOME | End: 2022-03-15
Admitting: ORTHOPAEDIC SURGERY

## 2022-03-15 ENCOUNTER — OFFICE VISIT (OUTPATIENT)
Dept: ORTHOPEDIC SURGERY | Facility: CLINIC | Age: 62
End: 2022-03-15

## 2022-03-15 ENCOUNTER — PRIOR AUTHORIZATION (OUTPATIENT)
Dept: INTERNAL MEDICINE | Facility: CLINIC | Age: 62
End: 2022-03-15

## 2022-03-15 VITALS — HEART RATE: 114 BPM | WEIGHT: 193 LBS | BODY MASS INDEX: 29.25 KG/M2 | HEIGHT: 68 IN | OXYGEN SATURATION: 98 %

## 2022-03-15 DIAGNOSIS — M16.11 OSTEOARTHRITIS OF RIGHT HIP, UNSPECIFIED OSTEOARTHRITIS TYPE: ICD-10-CM

## 2022-03-15 DIAGNOSIS — M79.661 RIGHT CALF PAIN: Primary | ICD-10-CM

## 2022-03-15 DIAGNOSIS — M70.61 TROCHANTERIC BURSITIS OF RIGHT HIP: ICD-10-CM

## 2022-03-15 DIAGNOSIS — M76.31 ILIOTIBIAL BAND TENDINITIS OF RIGHT SIDE: ICD-10-CM

## 2022-03-15 LAB
BH CV LOWER VASCULAR LEFT COMMON FEMORAL AUGMENT: NORMAL
BH CV LOWER VASCULAR LEFT COMMON FEMORAL COMPETENT: NORMAL
BH CV LOWER VASCULAR LEFT COMMON FEMORAL COMPRESS: NORMAL
BH CV LOWER VASCULAR LEFT COMMON FEMORAL PHASIC: NORMAL
BH CV LOWER VASCULAR LEFT COMMON FEMORAL SPONT: NORMAL
BH CV LOWER VASCULAR RIGHT COMMON FEMORAL AUGMENT: NORMAL
BH CV LOWER VASCULAR RIGHT COMMON FEMORAL COMPETENT: NORMAL
BH CV LOWER VASCULAR RIGHT COMMON FEMORAL COMPRESS: NORMAL
BH CV LOWER VASCULAR RIGHT COMMON FEMORAL PHASIC: NORMAL
BH CV LOWER VASCULAR RIGHT COMMON FEMORAL SPONT: NORMAL
BH CV LOWER VASCULAR RIGHT DISTAL FEMORAL COMPRESS: NORMAL
BH CV LOWER VASCULAR RIGHT GREATER SAPH AK COMPRESS: NORMAL
BH CV LOWER VASCULAR RIGHT MID FEMORAL AUGMENT: NORMAL
BH CV LOWER VASCULAR RIGHT MID FEMORAL COMPETENT: NORMAL
BH CV LOWER VASCULAR RIGHT MID FEMORAL COMPRESS: NORMAL
BH CV LOWER VASCULAR RIGHT MID FEMORAL PHASIC: NORMAL
BH CV LOWER VASCULAR RIGHT MID FEMORAL SPONT: NORMAL
BH CV LOWER VASCULAR RIGHT PERONEAL COMPRESS: NORMAL
BH CV LOWER VASCULAR RIGHT POPLITEAL AUGMENT: NORMAL
BH CV LOWER VASCULAR RIGHT POPLITEAL COMPETENT: NORMAL
BH CV LOWER VASCULAR RIGHT POPLITEAL COMPRESS: NORMAL
BH CV LOWER VASCULAR RIGHT POPLITEAL PHASIC: NORMAL
BH CV LOWER VASCULAR RIGHT POPLITEAL SPONT: NORMAL
BH CV LOWER VASCULAR RIGHT POSTERIOR TIBIAL COMPRESS: NORMAL
BH CV LOWER VASCULAR RIGHT PROXIMAL FEMORAL COMPRESS: NORMAL
MAXIMAL PREDICTED HEART RATE: 161 BPM
STRESS TARGET HR: 137 BPM

## 2022-03-15 PROCEDURE — 93971 EXTREMITY STUDY: CPT

## 2022-03-15 PROCEDURE — 99203 OFFICE O/P NEW LOW 30 MIN: CPT | Performed by: ORTHOPAEDIC SURGERY

## 2022-03-15 PROCEDURE — 93971 EXTREMITY STUDY: CPT | Performed by: SURGERY

## 2022-03-15 NOTE — TELEPHONE ENCOUNTER
Patient saw ortho today, sending for dvt eval and will follow up with them this week for hip injection if negative.

## 2022-03-15 NOTE — TELEPHONE ENCOUNTER
I discussed with the patient. She states she had previously taken diclofenac for her foot and it helped greatly. She says her hip and leg seems to be getting worse. She thinks it is swelling. I asked if it was painful in a certain area, red, or warm to the touch and she denied. She said she had some bruising on that leg also and felt that the pain was radiating down the leg.

## 2022-03-15 NOTE — PROGRESS NOTES
"Chief Complaint  Initial Evaluation of the Right Hip     Subjective      Shanthi Akers presents to [facility] for evaluation of the right hip. She reports numbness to her thigh. She has calf pain that started yesterday. She has no injury or trauma. She is scheduled to start physical therapy.     Allergies   Allergen Reactions   • Amoxicillin-Pot Clavulanate GI Intolerance   • Esomeprazole Hives   • Metronidazole Hives and Itching   • Tetracycline Other (See Comments) and Rash     Skin peeling on palms and feet          Social History     Socioeconomic History   • Marital status:    Tobacco Use   • Smoking status: Never Smoker   • Smokeless tobacco: Never Used   Vaping Use   • Vaping Use: Never used   Substance and Sexual Activity   • Alcohol use: Yes     Alcohol/week: 8.0 standard drinks     Types: 4 Glasses of wine, 4 Standard drinks or equivalent per week     Comment: WINE ALCOHOL   • Drug use: Never   • Sexual activity: Defer        Review of Systems     Objective   Vital Signs:   Pulse 114   Ht 171.5 cm (67.5\")   Wt 87.5 kg (193 lb)   SpO2 98%   BMI 29.78 kg/m²       Physical Exam  Constitutional:       Appearance: Normal appearance. The patient is well-developed and normal weight.   HENT:      Head: Normocephalic.      Right Ear: Hearing and external ear normal.      Left Ear: Hearing and external ear normal.      Nose: Nose normal.   Eyes:      Conjunctiva/sclera: Conjunctivae normal.   Cardiovascular:      Rate and Rhythm: Normal rate.   Pulmonary:      Effort: Pulmonary effort is normal.      Breath sounds: No wheezing or rales.   Abdominal:      Palpations: Abdomen is soft.      Tenderness: There is no abdominal tenderness.   Musculoskeletal:      Cervical back: Normal range of motion.   Skin:     Findings: No rash.   Neurological:      Mental Status: The patient is alert and oriented to person, place, and time.   Psychiatric:         Mood and Affect: Mood and " affect normal.         Judgment: Judgment normal.       Ortho Exam      Right lower extremity- bruising to the lower leg. Very tender to touch to the calf muscle. Neurovascularly intact. Positive EHL, FHL, GS and TA. Sensation intact to all 5 nerves of the foot. Positive pulses. Intact ankle plantar flexion and dorsiflexion. Tender to the lateral hip. Non-tender to the posterior hip or groin. Flexion 130. External Rotation 65. Internal rotation 30. tender over IT band. Decreased sensation to light touch over the thigh.     Procedures      Imaging Results (Most Recent)     None           Result Review :       XR Hip With or Without Pelvis 2 - 3 View Right    Result Date: 3/8/2022  Narrative: PROCEDURE: XR HIP W OR WO PELVIS 2-3 VIEW RIGHT  COMPARISON: [facility] , , XR HIP W OR WO PELVIS 2-3 VIEW LEFT, 9/27/2021, 14:00.  INDICATIONS: hip pain, right x 6 months, nki  FINDINGS:  Moderate right and mild left hip arthrosis.  No fracture or dislocation.  No aggressive appearing bone change.      Impression:  Right greater than left hip arthrosis.  No acute findings      BONY LEÓN MD       Electronically Signed and Approved By: BONY LEÓN MD on 3/08/2022 at 12:08                      Assessment and Plan     DX: Right trochanteric bursits. Calf pain. Right hip osteoarthritis. Iliotibial band tendinitis of right side      Discussed the treatment plan with the patient.  Plan for an ultrasound to evaluate for a DVT. May consider hip injection at follow up. Plan to proceed with physical therapy.     Call or return if worsening symptoms.    Follow Up     Thursday or Friday.       Patient was given instructions and counseling regarding her condition or for health maintenance advice. Please see specific information pulled into the AVS if appropriate.     Scribed for Christiano Langston MD by Janell Nails.  03/15/22   14:42 EDT    I have personally performed the services described in this document as scribed  by the above individual and it is both accurate and complete. Christiano Langston MD 03/16/22

## 2022-03-15 NOTE — TELEPHONE ENCOUNTER
If the pain is in her hip we can consider a scheduled NSAID until she able to get in with orthopedics, when is she scheduled? Also please ensure that the swelling and pain is not in her calf and that further evaluation for DVT is not indicated. If she would like to start an NSAID please let me know directly so this can be sent in sooner rather than later.

## 2022-03-16 ENCOUNTER — TELEPHONE (OUTPATIENT)
Dept: INTERNAL MEDICINE | Facility: CLINIC | Age: 62
End: 2022-03-16

## 2022-03-16 DIAGNOSIS — M70.61 TROCHANTERIC BURSITIS OF RIGHT HIP: Primary | ICD-10-CM

## 2022-03-16 RX ORDER — DICLOFENAC SODIUM 75 MG/1
75 TABLET, DELAYED RELEASE ORAL 2 TIMES DAILY
Qty: 60 TABLET | Refills: 0 | Status: SHIPPED | OUTPATIENT
Start: 2022-03-16 | End: 2022-04-15

## 2022-03-16 NOTE — TELEPHONE ENCOUNTER
Spoke with patient and gave her results. She wanted an update on the Clindamycin Cream PA. I looked and it said that it was started but no response. The patient was going to use it for BV. It has now been 2 weeks and she did not have any antibiotics for this bacteria. She is not have any symptoms but she did not have any symptoms when she had the test done. She wanted to know if she needed an antibiotics still.      Allergies: Amoxicillin, esomeprazole, Metronidazole, and Tetracycline.  Pharmacy: [facility].

## 2022-03-16 NOTE — TELEPHONE ENCOUNTER
Patient sent below message via [employer] requesting something to help with pain and swelling. [facility] in [facility].         (I had a test yesterday to make sure I did not have a blood clot. I was told I did not. I have a follow up appt 3/22. In the meantime I am having a lot of pain and the bruising/swelling has is getting larger. I have been taking 800mg of otc ibruprofen for pain and inflammation. It is not working.  I was wondering if Dr Han could prescribe me something for the swelling and pain. I work on feet all day.     Thank you,  Madeline Akers)

## 2022-03-16 NOTE — TELEPHONE ENCOUNTER
----- Message from AMANDA Catherine sent at 3/14/2022  7:28 AM EDT -----  Please call patient with results as they have not reviewed them via [employer].

## 2022-03-17 ENCOUNTER — HOSPITAL ENCOUNTER (OUTPATIENT)
Dept: MAMMOGRAPHY | Facility: HOSPITAL | Age: 62
Discharge: HOME | End: 2022-03-17
Admitting: NURSE PRACTITIONER

## 2022-03-17 DIAGNOSIS — Z12.31 VISIT FOR SCREENING MAMMOGRAM: ICD-10-CM

## 2022-03-17 PROCEDURE — 77067 SCR MAMMO BI INCL CAD: CPT

## 2022-03-17 PROCEDURE — 77063 BREAST TOMOSYNTHESIS BI: CPT

## 2022-03-18 NOTE — TELEPHONE ENCOUNTER
"Nothing yet, sent to plan on the 15th, cover my meds states: \"The plan will fax you a determination, typically within 1 to 5 business days.\"  "

## 2022-03-21 NOTE — TELEPHONE ENCOUNTER
Please let patient know the status of this and that we will wait a few more days to see if we can get it covered. Also, please be sure that the allergy to metronidazole was listed on the PA.

## 2022-03-23 ENCOUNTER — TELEPHONE (OUTPATIENT)
Dept: INTERNAL MEDICINE | Facility: CLINIC | Age: 62
End: 2022-03-23

## 2022-03-23 RX ORDER — SUMATRIPTAN 25 MG/1
TABLET, FILM COATED ORAL
Qty: 15 TABLET | Refills: 1 | Status: SHIPPED | OUTPATIENT
Start: 2022-03-23 | End: 2022-06-10 | Stop reason: ALTCHOICE

## 2022-03-23 RX ORDER — METRONIDAZOLE 7.5 MG/G
GEL VAGINAL
Qty: 70 G | Refills: 0 | Status: SHIPPED | OUTPATIENT
Start: 2022-03-23 | End: 2022-06-10

## 2022-03-24 ENCOUNTER — HOSPITAL ENCOUNTER (OUTPATIENT)
Dept: CT IMAGING | Facility: HOSPITAL | Age: 62
Discharge: HOME | End: 2022-03-24
Admitting: NURSE PRACTITIONER

## 2022-03-24 DIAGNOSIS — J12.82 PNEUMONIA DUE TO COVID-19 VIRUS: ICD-10-CM

## 2022-03-24 DIAGNOSIS — R05.9 COUGH: ICD-10-CM

## 2022-03-24 DIAGNOSIS — U07.1 PNEUMONIA DUE TO COVID-19 VIRUS: ICD-10-CM

## 2022-03-24 PROCEDURE — 71260 CT THORAX DX C+: CPT

## 2022-03-24 PROCEDURE — 0 IOPAMIDOL PER 1 ML: Performed by: NURSE PRACTITIONER

## 2022-03-24 RX ADMIN — IOPAMIDOL 100 ML: 755 INJECTION, SOLUTION INTRAVENOUS at 09:05

## 2022-03-25 ENCOUNTER — OFFICE VISIT (OUTPATIENT)
Dept: ORTHOPEDIC SURGERY | Facility: CLINIC | Age: 62
End: 2022-03-25

## 2022-03-25 VITALS — BODY MASS INDEX: 28.04 KG/M2 | WEIGHT: 185 LBS | HEIGHT: 68 IN

## 2022-03-25 DIAGNOSIS — M70.61 TROCHANTERIC BURSITIS OF RIGHT HIP: Primary | ICD-10-CM

## 2022-03-25 PROCEDURE — 20610 DRAIN/INJ JOINT/BURSA W/O US: CPT | Performed by: ORTHOPAEDIC SURGERY

## 2022-03-25 PROCEDURE — 99213 OFFICE O/P EST LOW 20 MIN: CPT | Performed by: ORTHOPAEDIC SURGERY

## 2022-03-25 RX ORDER — TRIAMCINOLONE ACETONIDE 40 MG/ML
40 INJECTION, SUSPENSION INTRA-ARTICULAR; INTRAMUSCULAR
Status: COMPLETED | OUTPATIENT
Start: 2022-03-25 | End: 2022-03-25

## 2022-03-25 RX ORDER — LIDOCAINE HYDROCHLORIDE 10 MG/ML
5 INJECTION, SOLUTION INFILTRATION; PERINEURAL
Status: COMPLETED | OUTPATIENT
Start: 2022-03-25 | End: 2022-03-25

## 2022-03-25 RX ADMIN — TRIAMCINOLONE ACETONIDE 40 MG: 40 INJECTION, SUSPENSION INTRA-ARTICULAR; INTRAMUSCULAR at 08:56

## 2022-03-25 RX ADMIN — LIDOCAINE HYDROCHLORIDE 5 ML: 10 INJECTION, SOLUTION INFILTRATION; PERINEURAL at 08:56

## 2022-03-25 NOTE — PROGRESS NOTES
"Chief Complaint  Pain of the Right Leg and Pain of the Right Hip     Subjective      Shanthi Akers presents to [facility] for follow up evaluation of the right lower extremity. She recently had an ultrasound and is here to discuss the results. To review, She reports numbness to her thigh and had calf pain and swelling. She has no other complaints.     Allergies   Allergen Reactions   • Amoxicillin-Pot Clavulanate GI Intolerance   • Esomeprazole Hives   • Metronidazole Hives and Itching   • Tetracycline Other (See Comments) and Rash     Skin peeling on palms and feet          Social History     Socioeconomic History   • Marital status:    Tobacco Use   • Smoking status: Never Smoker   • Smokeless tobacco: Never Used   Vaping Use   • Vaping Use: Never used   Substance and Sexual Activity   • Alcohol use: Yes     Alcohol/week: 8.0 standard drinks     Types: 4 Glasses of wine, 4 Standard drinks or equivalent per week     Comment: WINE ALCOHOL   • Drug use: Never   • Sexual activity: Defer        Review of Systems     Objective   Vital Signs:   Ht 172.7 cm (68\")   Wt 83.9 kg (185 lb)   BMI 28.13 kg/m²       Physical Exam  Constitutional:       Appearance: Normal appearance. The patient is well-developed and normal weight.   HENT:      Head: Normocephalic.      Right Ear: Hearing and external ear normal.      Left Ear: Hearing and external ear normal.      Nose: Nose normal.   Eyes:      Conjunctiva/sclera: Conjunctivae normal.   Cardiovascular:      Rate and Rhythm: Normal rate.   Pulmonary:      Effort: Pulmonary effort is normal.      Breath sounds: No wheezing or rales.   Abdominal:      Palpations: Abdomen is soft.      Tenderness: There is no abdominal tenderness.   Musculoskeletal:      Cervical back: Normal range of motion.   Skin:     Findings: No rash.   Neurological:      Mental Status: The patient is alert and oriented to person, place, and time.   Psychiatric:         " Mood and Affect: Mood and affect normal.         Judgment: Judgment normal.       Ortho Exam      Right lower extremity- improved calf muscle pain and swelling.  Neurovascularly intact. Positive EHL, FHL, GS and TA. Sensation intact to all 5 nerves of the foot. Positive pulses. Intact ankle plantar flexion and dorsiflexion. Tender to the lateral hip. Non-tender to the posterior hip or groin. Flexion 130. External Rotation 65. Internal rotation 30. tender over IT band. Decreased sensation to light touch over the thigh.     Right hip : R greater trochanteric bursa  Date/Time: 3/25/2022 8:56 AM  Consent given by: patient  Site marked: site marked  Timeout: Immediately prior to procedure a time out was called to verify the correct patient, procedure, equipment, support staff and site/side marked as required   Supporting Documentation  Indications: pain   Procedure Details  Location: hip - R greater trochanteric bursa  Needle size: 22 G  Medications administered: 5 mL lidocaine 1 %; 40 mg triamcinolone acetonide 40 MG/ML  Patient tolerance: patient tolerated the procedure well with no immediate complications            Imaging Results (Most Recent)     None           Result Review :       CT Chest With Contrast Diagnostic    Result Date: 3/24/2022  Narrative: PROCEDURE: CT CHEST W CONTRAST DIAGNOSTIC  COMPARISON:  [facility], CT, CT CHEST PULMONARY EMBOLISM, 12/28/2021, 18:40. INDICATIONS: persistent cough, history of covid pneumonia  TECHNIQUE: After obtaining the patient's consent, CT images were obtained with non-ionic intravenous contrast material.   PROTOCOL:   Standard imaging protocol performed    RADIATION:   DLP: 526mGy*cm   Automated exposure control was utilized to minimize radiation dose.  100cc Isovue 370 I.V.  FINDINGS:  Persistent but improving patchy areas of predominately interstitial prominence.  These areas were previously ground-glass and interstitial opacity.  No new dense  consolidation.  No adenopathy in the chest.  No acute findings in the included upper abdomen.  No aggressive appearing bone change.      Impression:  Persistent but improving pulmonary opacities may represent post infectious or inflammatory change.  There is no new dense consolidation.     BOYN LEÓN MD       Electronically Signed and Approved By: BONY LEÓN MD on 3/24/2022 at 9:30             Duplex Venous Lower Extremity - Right CAR    Result Date: 3/15/2022  Narrative: · Normal right lower extremity venous duplex scan.      Mammo Screening Digital Tomosynthesis Bilateral With CAD    Result Date: 3/17/2022  Narrative: PROCEDURE: MAMMO SCREENING DIGITAL TOMOSYNTHESIS BILATERAL W CAD  COMPARISON: Other, , DIGITAL SCREENING W/CAD, 6/17/2015, 18:09.  [facility], , DIG SCREENING BILAT YESSENIA W 3D GELACIO, 2/05/2021, 15:30.  VIEWS:  BILATERAL CC AND MLO VIEWS WERE OBTAINED UTILIZING 3D TOMOSYNTHESIS AND R2 CAD SOFTWARE  INDICATIONS: screening  FINDINGS:  No suspicious mass, area of architectural distortion or suspicious microcalcification is identified.      Impression:  Benign mammogram. Suggest routine mammographic screening.  RECOMMENDATION(S):  ROUTINE MAMMOGRAM AND CLINICAL EVALUATION IN 12 MONTHS.   BIRADS:  DIAGNOSTIC CATEGORY 1--NEGATIVE.   BREAST COMPOSITION: Scattered areas fibroglandular density.  PLEASE NOTE:  A NORMAL MAMMOGRAM DOES NOT EXCLUDE THE POSSIBILITY OF BREAST CANCER. ANY CLINICALLY SUSPICIOUS PALPABLE LUMP SHOULD BE BIOPSIED.      NEPTALI HUNTLEY MD       Electronically Signed and Approved By: NEPTALI HUNTLEY MD on 3/17/2022 at 9:15             XR Hip With or Without Pelvis 2 - 3 View Right    Result Date: 3/8/2022  Narrative: PROCEDURE: XR HIP W OR WO PELVIS 2-3 VIEW RIGHT  COMPARISON: [facility] , , XR HIP W OR WO PELVIS 2-3 VIEW LEFT, 9/27/2021, 14:00.  INDICATIONS: hip pain, right x 6 months, nki  FINDINGS:  Moderate right and mild left hip arthrosis.  No fracture or  dislocation.  No aggressive appearing bone change.      Impression:  Right greater than left hip arthrosis.  No acute findings      BONY LEÓN MD       Electronically Signed and Approved By: BONY LEÓN MD on 3/08/2022 at 12:08                      Assessment and Plan     DX: Right trochanteric bursitis     Discussed the treatment plan with the patient.  The results of previous venous ultrasound were discussed with the patient. Discussed the risks and benefits of a a right hip bursa injection. The patient expressed understanding and wished to proceed. She tolerated the injection well.     Call or return if worsening symptoms.    Follow Up     6 weeks      Patient was given instructions and counseling regarding her condition or for health maintenance advice. Please see specific information pulled into the AVS if appropriate.     Scribed for Christiano Langston MD by Janell Nails.  03/25/22   08:42 EDT    I have personally performed the services described in this document as scribed by the above individual and it is both accurate and complete. Christiano Langston MD 03/25/22

## 2022-04-01 ENCOUNTER — TELEPHONE (OUTPATIENT)
Dept: GENERAL RADIOLOGY | Facility: HOSPITAL | Age: 62
End: 2022-04-01

## 2022-04-01 NOTE — TELEPHONE ENCOUNTER
Per the patient the address on file is correct. She has been having issues receiving mail. The letter will be mailed again to the address on file.

## 2022-04-01 NOTE — TELEPHONE ENCOUNTER
Patient mammography result letter was returned for reason not deliverable as addressed. A message was left requesting current mailing information.

## 2022-04-05 DIAGNOSIS — R05.9 COUGH: ICD-10-CM

## 2022-04-06 RX ORDER — AZELASTINE HCL 205.5 UG/1
SPRAY NASAL
Qty: 30 ML | Refills: 0 | Status: SHIPPED | OUTPATIENT
Start: 2022-04-06 | End: 2022-06-01

## 2022-04-07 ENCOUNTER — PATIENT MESSAGE (OUTPATIENT)
Dept: INTERNAL MEDICINE | Facility: CLINIC | Age: 62
End: 2022-04-07

## 2022-04-07 RX ORDER — ALBUTEROL SULFATE 90 UG/1
2 AEROSOL, METERED RESPIRATORY (INHALATION) EVERY 4 HOURS PRN
Qty: 8 G | Refills: 2 | Status: SHIPPED | OUTPATIENT
Start: 2022-04-07 | End: 2022-08-05

## 2022-04-07 NOTE — TELEPHONE ENCOUNTER
From: Shanthi Akers  To: AMANDA Catherine  Sent: 4/7/2022 11:14 AM EDT  Subject: ProAir inhaler    Can you please call in a prescription for me for my proair. I can not remember were I had it filled. If you could please call it in to [facility].     Thank you  Madeline

## 2022-04-11 NOTE — PRE-PROCEDURE INSTRUCTIONS
Called patient to discuss instructions for laxative. Patient stated understanding for 2 part prep. Discussed skin prep, clear liquid diet, and pre-op medications. Patient aware they can take Advair HFA, Albuterol HFA, Lipitor, Azelastine, Singulair, Prilosec, and Paxil.  Patient stated understanding, No other issues or concerns noted currently per patient.  Patient is vaccinated for covid-19.

## 2022-04-12 ENCOUNTER — ANESTHESIA (OUTPATIENT)
Dept: GASTROENTEROLOGY | Facility: HOSPITAL | Age: 62
End: 2022-04-12

## 2022-04-12 ENCOUNTER — ANESTHESIA EVENT (OUTPATIENT)
Dept: GASTROENTEROLOGY | Facility: HOSPITAL | Age: 62
End: 2022-04-12

## 2022-04-12 ENCOUNTER — HOSPITAL ENCOUNTER (OUTPATIENT)
Facility: HOSPITAL | Age: 62
Setting detail: HOSPITAL OUTPATIENT SURGERY
Discharge: HOME | End: 2022-04-12
Attending: SURGERY | Admitting: SURGERY

## 2022-04-12 VITALS
OXYGEN SATURATION: 99 % | HEIGHT: 68 IN | WEIGHT: 182.32 LBS | SYSTOLIC BLOOD PRESSURE: 121 MMHG | DIASTOLIC BLOOD PRESSURE: 79 MMHG | RESPIRATION RATE: 20 BRPM | HEART RATE: 107 BPM | TEMPERATURE: 97.8 F | BODY MASS INDEX: 27.63 KG/M2

## 2022-04-12 DIAGNOSIS — K21.9 GERD WITHOUT ESOPHAGITIS: ICD-10-CM

## 2022-04-12 DIAGNOSIS — Z87.19 HISTORY OF BARRETT'S ESOPHAGUS: ICD-10-CM

## 2022-04-12 DIAGNOSIS — K62.5 BRBPR (BRIGHT RED BLOOD PER RECTUM): ICD-10-CM

## 2022-04-12 PROCEDURE — 88305 TISSUE EXAM BY PATHOLOGIST: CPT | Performed by: SURGERY

## 2022-04-12 PROCEDURE — 25010000002 PROPOFOL 10 MG/ML EMULSION: Performed by: NURSE ANESTHETIST, CERTIFIED REGISTERED

## 2022-04-12 RX ORDER — PROPOFOL 10 MG/ML
VIAL (ML) INTRAVENOUS AS NEEDED
Status: DISCONTINUED | OUTPATIENT
Start: 2022-04-12 | End: 2022-04-12 | Stop reason: SURG

## 2022-04-12 RX ORDER — SODIUM CHLORIDE 0.9 % (FLUSH) 0.9 %
3 SYRINGE (ML) INJECTION EVERY 12 HOURS SCHEDULED
Status: DISCONTINUED | OUTPATIENT
Start: 2022-04-12 | End: 2022-04-12 | Stop reason: HOSPADM

## 2022-04-12 RX ORDER — SODIUM CHLORIDE 0.9 % (FLUSH) 0.9 %
10 SYRINGE (ML) INJECTION AS NEEDED
Status: DISCONTINUED | OUTPATIENT
Start: 2022-04-12 | End: 2022-04-12 | Stop reason: HOSPADM

## 2022-04-12 RX ORDER — SODIUM CHLORIDE, SODIUM LACTATE, POTASSIUM CHLORIDE, CALCIUM CHLORIDE 600; 310; 30; 20 MG/100ML; MG/100ML; MG/100ML; MG/100ML
30 INJECTION, SOLUTION INTRAVENOUS CONTINUOUS
Status: DISCONTINUED | OUTPATIENT
Start: 2022-04-12 | End: 2022-04-12 | Stop reason: HOSPADM

## 2022-04-12 RX ORDER — LIDOCAINE HYDROCHLORIDE 20 MG/ML
INJECTION, SOLUTION INFILTRATION; PERINEURAL AS NEEDED
Status: DISCONTINUED | OUTPATIENT
Start: 2022-04-12 | End: 2022-04-12 | Stop reason: SURG

## 2022-04-12 RX ADMIN — LIDOCAINE HYDROCHLORIDE 60 MG: 20 INJECTION, SOLUTION INFILTRATION; PERINEURAL at 15:04

## 2022-04-12 RX ADMIN — PROPOFOL 30 MG: 10 INJECTION, EMULSION INTRAVENOUS at 15:06

## 2022-04-12 RX ADMIN — PROPOFOL 50 MG: 10 INJECTION, EMULSION INTRAVENOUS at 15:16

## 2022-04-12 RX ADMIN — SODIUM CHLORIDE, POTASSIUM CHLORIDE, SODIUM LACTATE AND CALCIUM CHLORIDE 30 ML/HR: 600; 310; 30; 20 INJECTION, SOLUTION INTRAVENOUS at 13:48

## 2022-04-12 RX ADMIN — PROPOFOL 50 MG: 10 INJECTION, EMULSION INTRAVENOUS at 15:05

## 2022-04-12 RX ADMIN — PROPOFOL 200 MCG/KG/MIN: 10 INJECTION, EMULSION INTRAVENOUS at 15:04

## 2022-04-12 RX ADMIN — PROPOFOL 100 MG: 10 INJECTION, EMULSION INTRAVENOUS at 15:04

## 2022-04-12 NOTE — ANESTHESIA PREPROCEDURE EVALUATION
Anesthesia Evaluation     Patient summary reviewed and Nursing notes reviewed   no history of anesthetic complications:  NPO Solid Status: > 8 hours  NPO Liquid Status: > 2 hours           Airway   Mallampati: I  TM distance: >3 FB  Neck ROM: full  No difficulty expected  Dental          Pulmonary - normal exam    breath sounds clear to auscultation  (+) asthma,  Cardiovascular - normal exam  Exercise tolerance: good (4-7 METS)    Rhythm: regular  Rate: normal    (+) hypertension,       Neuro/Psych  (+) psychiatric history Anxiety and Depression,    GI/Hepatic/Renal/Endo    (+)  GERD, GI bleeding lower ,     Musculoskeletal (-) negative ROS    Abdominal    Substance History - negative use     OB/GYN negative ob/gyn ROS         Other - negative ROS                       Anesthesia Plan    ASA 3     general       Anesthetic plan, all risks, benefits, and alternatives have been provided, discussed and informed consent has been obtained with: patient.        CODE STATUS:

## 2022-04-12 NOTE — ANESTHESIA POSTPROCEDURE EVALUATION
Patient: Shanthi Akers    Procedure Summary     Date: 04/12/22 Room / Location: [facility] ENDOSCOPY 3 / [facility] ENDOSCOPY    Anesthesia Start: 1504 Anesthesia Stop: 1542    Procedures:       ESOPHAGOGASTRODUODENOSCOPY WITH BIOPSIES (N/A )      COLONOSCOPY (N/A ) Diagnosis:       GERD without esophagitis      History of Rogel's esophagus      BRBPR (bright red blood per rectum)      (GERD without esophagitis [K21.9])      (History of Rogel's esophagus [Z87.19])      (BRBPR (bright red blood per rectum) [K62.5])    Surgeons: Ishmael Ramirez MD Provider: Antonio Vargas MD    Anesthesia Type: general ASA Status: 3          Anesthesia Type: general    Vitals  Vitals Value Taken Time   /75 04/12/22 1542   Temp     Pulse 102 04/12/22 1544   Resp     SpO2 96 % 04/12/22 1544   Vitals shown include unvalidated device data.        Post Anesthesia Care and Evaluation    Patient location during evaluation: bedside  Patient participation: complete - patient participated  Level of consciousness: awake  Pain management: adequate  Airway patency: patent  Anesthetic complications: No anesthetic complications  PONV Status: none  Cardiovascular status: acceptable and stable  Respiratory status: acceptable  Hydration status: acceptable    Comments: An Anesthesiologist personally participated in the most demanding procedures (including induction and emergence if applicable) in the anesthesia plan, monitored the course of anesthesia administration at frequent intervals and remained physically present and available for immediate diagnosis and treatment of emergencies.

## 2022-04-12 NOTE — H&P
Chief Complaint: No chief complaint on file.    Subjective      EGD and colonoscopy consultation       History of Present Illness  Shanthi Akers is a 61 y.o. female presents to [facility] for EGD and colonoscopy consultation.    Patient presents today on referral from Charo Barragan for an EGD and colonoscopy consultation.    Patient reports heartburn indigestion despite taking omeprazole daily.  Reports she has with breakthrough symptoms at night.    Denies any dysphagia.  Denies any epigastric pain.    Admits to bright red blood per rectum with some clots after having bowel movements.    Denies any abdominal pain.    Admits to a change in her bowel habits with narrow pencillike stools.    Admits to a history of colonic polyps.    Reports a history of Rogel's esophagus.    2/21: EGD & Colonoscopy (Sp): Gastritis; reflux esophagitis-negative for intestinal metaplasia, dysplasia and malignancy.; Ascending - tubular adenoma; Transverse - tubular adenoma.    3/19: EGD & Colonoscopy (sp): Rogel's; transverse - tubular adenoma; Rectum - 2 tubulovillous adenomas.    4/14: EGD (Maria Ines): Fundic gland polyp; esophogeal stricture with dilation; Rogel's.    8/13: Colonoscopy (Maria Ines): rectum - Tubulovillous adenomas.    5/13: Colonoscopy (Maria Ines): Transverse - tubulovillous adenoma; Rectum - tubular adenoma.   Objective     Past Medical History:   Diagnosis Date   • Asthma    • Broken bones    • Chronic allergic rhinitis    • Depression with anxiety    • Essential hypertension    • GERD (gastroesophageal reflux disease)    • Hyperlipidemia    • Migraine headache    • Numbness and tingling of leg     right thigh   • Psychiatric disorder    • Seasonal allergies    • Shortness of breath    • Sinus trouble        Past Surgical History:   Procedure Laterality Date   • COLON SURGERY     • ENDOMETRIAL ABLATION     • ENDOSCOPY     • HAMMER TOE REPAIR Left    • [facility]  EXCISION Right        Outpatient Medications Marked as Taking for the 4/12/22 encounter (Hospital Encounter)   Medication Sig Dispense Refill   • Advair Diskus 500-50 MCG/DOSE DISKUS INHALE 1 PUFF TWICE DAILY 60 each 0   • albuterol sulfate  (90 Base) MCG/ACT inhaler Inhale 2 puffs Every 4 (Four) Hours As Needed for Wheezing. 8 g 2   • aspirin 81 MG EC tablet Take 81 mg by mouth Daily.     • atorvastatin (LIPITOR) 40 MG tablet Take 1 tablet by mouth Daily. 90 tablet 1   • azelastine (ASTEPRO) 0.15 % solution nasal spray USE 2 SPRAYS IN EACH NOSTRIL ONCE DAILY 30 mL 0   • diclofenac (VOLTAREN) 75 MG EC tablet Take 1 tablet by mouth 2 (Two) Times a Day. 60 tablet 0   • lisinopril (PRINIVIL,ZESTRIL) 30 MG tablet TAKE ONE TABLET BY MOUTH ONCE DAILY 90 tablet 0   • meclizine (ANTIVERT) 25 MG tablet meclizine 25 mg oral tablet take 1 tablet (25 mg) by oral route 3 times per day as needed   Active     • montelukast (SINGULAIR) 10 MG tablet Take 1 tablet by mouth Daily. 90 tablet 1   • omeprazole (priLOSEC) 40 MG capsule TAKE ONE CAPSULE BY MOUTH DAILY BEFORE A MEAL 90 capsule 0   • PARoxetine (Paxil) 40 MG tablet Take 1 tablet by mouth Every Morning. 90 tablet 1   • polyethylene glycol (MIRALAX) 17 g packet Take as directed.  Instructions given in office.  Dispense: 238 g bottle 238 packet 0   • potassium chloride ER (K-TAB) 20 MEQ tablet controlled-release ER tablet Take 1 tablet by mouth Daily. 180 tablet 1   • Prenatal Multivit-Min-Fe-FA (Prenatal Forte) tablet Take 1 tablet by mouth Daily.     • SUMAtriptan (Imitrex) 25 MG tablet Take one tablet at onset of headache. May repeat dose one time in 2 hours if headache not relieved. 15 tablet 1   • VITAMIN D, CHOLECALCIFEROL, PO Take  by mouth.         Allergies   Allergen Reactions   • Amoxicillin-Pot Clavulanate GI Intolerance   • Esomeprazole Hives   • Metronidazole Hives and Itching   • Tetracycline Other (See Comments) and Rash     Skin peeling on palms and  "feet          Family History   Problem Relation Age of Onset   • Heart failure Mother    • Hyperlipidemia Mother    • Hypertension Mother    • Rheumatic fever Mother    • Stroke Mother    • Heart attack Mother    • Melanoma Brother    • Malig Hyperthermia Neg Hx        Social History     Socioeconomic History   • Marital status:    Tobacco Use   • Smoking status: Never Smoker   • Smokeless tobacco: Never Used   Vaping Use   • Vaping Use: Never used   Substance and Sexual Activity   • Alcohol use: Yes     Alcohol/week: 8.0 standard drinks     Types: 4 Glasses of wine, 4 Standard drinks or equivalent per week     Comment: WINE ALCOHOL   • Drug use: Never   • Sexual activity: Defer       Vital Signs:   /92 (BP Location: Left arm, Patient Position: Lying)   Pulse 112   Temp 97.9 °F (36.6 °C) (Temporal)   Resp 20   Ht 172.7 cm (68\")   Wt 82.7 kg (182 lb 5.1 oz)   SpO2 97%   BMI 27.72 kg/m²      Physical Exam  Constitutional:       Appearance: Normal appearance.   HENT:      Head: Normocephalic.   Cardiovascular:      Rate and Rhythm: Normal rate.   Pulmonary:      Effort: Pulmonary effort is normal.   Abdominal:      General: Abdomen is flat.      Palpations: Abdomen is soft.   Skin:     General: Skin is warm and dry.   Neurological:      General: No focal deficit present.      Mental Status: She is alert and oriented to person, place, and time.   Psychiatric:         Mood and Affect: Mood normal.         Thought Content: Thought content normal.        Assessment   1. Gastroesophageal reflux disease without esophagitis (Primary)     2. BRBPR (bright red blood per rectum)     3. History of Rogel's esophagus     4. History of colonic polyps     Plan  Colonoscopy  EGD    Risks and benefits discussed    Ishmael Ramirez M.D.  04/12/22    Electronically signed by Ishmael Ramirez MD, 04/12/22, 1:52 PM EDT.    "

## 2022-04-14 LAB
CYTO UR: NORMAL
LAB AP CASE REPORT: NORMAL
LAB AP CLINICAL INFORMATION: NORMAL
PATH REPORT.FINAL DX SPEC: NORMAL
PATH REPORT.GROSS SPEC: NORMAL

## 2022-04-15 DIAGNOSIS — M70.61 TROCHANTERIC BURSITIS OF RIGHT HIP: ICD-10-CM

## 2022-04-15 RX ORDER — DICLOFENAC SODIUM 75 MG/1
75 TABLET, DELAYED RELEASE ORAL 2 TIMES DAILY
Qty: 60 TABLET | Refills: 0 | Status: SHIPPED | OUTPATIENT
Start: 2022-04-15 | End: 2022-05-26

## 2022-04-19 ENCOUNTER — TELEPHONE (OUTPATIENT)
Dept: SURGERY | Facility: CLINIC | Age: 62
End: 2022-04-19

## 2022-04-22 ENCOUNTER — OFFICE VISIT (OUTPATIENT)
Dept: ORTHOPEDIC SURGERY | Facility: CLINIC | Age: 62
End: 2022-04-22

## 2022-04-22 VITALS — HEIGHT: 68 IN | OXYGEN SATURATION: 98 % | HEART RATE: 98 BPM | WEIGHT: 182 LBS | BODY MASS INDEX: 27.58 KG/M2

## 2022-04-22 DIAGNOSIS — M25.512 LEFT SHOULDER PAIN, UNSPECIFIED CHRONICITY: Primary | ICD-10-CM

## 2022-04-22 DIAGNOSIS — R20.2 ARM PARESTHESIA, LEFT: ICD-10-CM

## 2022-04-22 DIAGNOSIS — M75.42 IMPINGEMENT SYNDROME OF LEFT SHOULDER: ICD-10-CM

## 2022-04-22 PROCEDURE — 99213 OFFICE O/P EST LOW 20 MIN: CPT | Performed by: ORTHOPAEDIC SURGERY

## 2022-04-22 RX ORDER — ATORVASTATIN CALCIUM 20 MG/1
TABLET, FILM COATED ORAL
COMMUNITY
Start: 2022-03-27 | End: 2022-06-10

## 2022-04-22 RX ORDER — SCOLOPAMINE TRANSDERMAL SYSTEM 1 MG/1
1 PATCH, EXTENDED RELEASE TRANSDERMAL
Qty: 2 PATCH | Refills: 0 | Status: SHIPPED | OUTPATIENT
Start: 2022-04-22 | End: 2022-06-10

## 2022-04-22 RX ORDER — POTASSIUM CHLORIDE 20 MEQ/1
TABLET, EXTENDED RELEASE ORAL
COMMUNITY
Start: 2022-03-29 | End: 2022-10-19 | Stop reason: SDUPTHER

## 2022-04-22 NOTE — PROGRESS NOTES
"Chief Complaint  Follow-up of the Left Shoulder     Subjective      Shanthi Akers presents to [facility] for evaluation of the left shoulder. The patient has no specific injury or trauma. She reports tingling to her left arm. She has had a dislocation in the past. She has no other complaints. She does admit to neck pain.     Allergies   Allergen Reactions   • Amoxicillin-Pot Clavulanate GI Intolerance   • Esomeprazole Hives   • Metronidazole Hives and Itching   • Tetracycline Other (See Comments) and Rash     Skin peeling on palms and feet          Social History     Socioeconomic History   • Marital status:    Tobacco Use   • Smoking status: Never Smoker   • Smokeless tobacco: Never Used   Vaping Use   • Vaping Use: Never used   Substance and Sexual Activity   • Alcohol use: Yes     Alcohol/week: 8.0 standard drinks     Types: 4 Glasses of wine, 4 Standard drinks or equivalent per week     Comment: WINE ALCOHOL   • Drug use: Never   • Sexual activity: Defer        Review of Systems     Objective   Vital Signs:   Pulse 98   Ht 171.5 cm (67.5\")   Wt 82.6 kg (182 lb)   SpO2 98%   BMI 28.08 kg/m²       Physical Exam  Constitutional:       Appearance: Normal appearance. The patient is well-developed and normal weight.   HENT:      Head: Normocephalic.      Right Ear: Hearing and external ear normal.      Left Ear: Hearing and external ear normal.      Nose: Nose normal.   Eyes:      Conjunctiva/sclera: Conjunctivae normal.   Cardiovascular:      Rate and Rhythm: Normal rate.   Pulmonary:      Effort: Pulmonary effort is normal.      Breath sounds: No wheezing or rales.   Abdominal:      Palpations: Abdomen is soft.      Tenderness: There is no abdominal tenderness.   Musculoskeletal:      Cervical back: Normal range of motion.   Skin:     Findings: No rash.   Neurological:      Mental Status: The patient is alert and oriented to person, place, and time.   Psychiatric:         " Mood and Affect: Mood and affect normal.         Judgment: Judgment normal.       Ortho Exam      Left shoulder- Forward elevation 170. Abduction 165. External Rotation 80. Internal rotation 70. 4+ supraspinatus strength. Tender to anterior shoulder. Internal rotation to L-5. 5/5 infraspinatus  And subscapularis. Pain with cross arm adduction. Positive impingement signs.     Procedures    X-Ray Report:  Left shoulder(s) X-Ray  Indication: Evaluation of left shoulder pain  AP and Lateral view(s)  Findings: no acute fracture. Mild degenerative changes to acromioclavicular joint and shoulder joint.   Prior studies available for comparison: no         Imaging Results (Most Recent)     Procedure Component Value Units Date/Time    XR Scapula Left [[id number]] Resulted: 04/22/22 0919     Updated: 04/22/22 0920           Result Review :       CT Chest With Contrast Diagnostic    Result Date: 3/24/2022  Narrative: PROCEDURE: CT CHEST W CONTRAST DIAGNOSTIC  COMPARISON:  [facility], CT, CT CHEST PULMONARY EMBOLISM, 12/28/2021, 18:40. INDICATIONS: persistent cough, history of covid pneumonia  TECHNIQUE: After obtaining the patient's consent, CT images were obtained with non-ionic intravenous contrast material.   PROTOCOL:   Standard imaging protocol performed    RADIATION:   DLP: 526mGy*cm   Automated exposure control was utilized to minimize radiation dose.  100cc Isovue 370 I.V.  FINDINGS:  Persistent but improving patchy areas of predominately interstitial prominence.  These areas were previously ground-glass and interstitial opacity.  No new dense consolidation.  No adenopathy in the chest.  No acute findings in the included upper abdomen.  No aggressive appearing bone change.      Impression:  Persistent but improving pulmonary opacities may represent post infectious or inflammatory change.  There is no new dense consolidation.     BONY LEÓN MD       Electronically Signed and Approved By: BONY LEÓN MD on  3/24/2022 at 9:30                      Assessment and Plan     DX: Left shoulder pain, paresthesia, impingement     Discussed the treatment plan with the patient.  Plan for MRI and EMG of the left shoulder. The patient expressed understanding and wished to proceed.     Call or return if worsening symptoms.    Follow Up     After MRI and EMG      Patient was given instructions and counseling regarding her condition or for health maintenance advice. Please see specific information pulled into the AVS if appropriate.     Scribed for Christiano Langston MD by Janell Nails.  04/22/22   09:28 EDT    I have personally performed the services described in this document as scribed by the above individual and it is both accurate and complete. Christiano Langston MD 04/22/22

## 2022-04-28 RX ORDER — FLUTICASONE PROPIONATE AND SALMETEROL 50; 500 UG/1; UG/1
POWDER RESPIRATORY (INHALATION)
Qty: 60 EACH | Refills: 0 | Status: SHIPPED | OUTPATIENT
Start: 2022-04-28 | End: 2022-06-01

## 2022-04-28 RX ORDER — OMEPRAZOLE 40 MG/1
CAPSULE, DELAYED RELEASE ORAL
Qty: 90 CAPSULE | Refills: 0 | OUTPATIENT
Start: 2022-04-28

## 2022-04-29 DIAGNOSIS — Z87.19 HISTORY OF BARRETT'S ESOPHAGUS: Primary | ICD-10-CM

## 2022-04-29 DIAGNOSIS — K21.9 GERD WITHOUT ESOPHAGITIS: ICD-10-CM

## 2022-04-29 RX ORDER — OMEPRAZOLE 40 MG/1
40 CAPSULE, DELAYED RELEASE ORAL
Qty: 90 CAPSULE | Refills: 0 | OUTPATIENT
Start: 2022-04-29

## 2022-05-02 ENCOUNTER — PATIENT MESSAGE (OUTPATIENT)
Dept: INTERNAL MEDICINE | Facility: CLINIC | Age: 62
End: 2022-05-02

## 2022-05-02 RX ORDER — PAROXETINE HYDROCHLORIDE 40 MG/1
TABLET, FILM COATED ORAL
Qty: 90 TABLET | Refills: 0 | Status: SHIPPED | OUTPATIENT
Start: 2022-05-02 | End: 2022-06-01

## 2022-05-03 RX ORDER — OMEPRAZOLE 40 MG/1
40 CAPSULE, DELAYED RELEASE ORAL
Qty: 90 CAPSULE | Refills: 1 | Status: SHIPPED | OUTPATIENT
Start: 2022-05-03 | End: 2022-05-04 | Stop reason: SDUPTHER

## 2022-05-03 NOTE — TELEPHONE ENCOUNTER
Red rule verified and correct.    Pt calling regarding omeprazole 40 mg being denied.    She takes 40 mg daily and her GI doc gives her 20 mg to take later in the day prn.    Requested Prescriptions     Signed Prescriptions Disp Refills   • omeprazole (priLOSEC) 40 MG capsule 90 capsule 1     Sig: Take 1 capsule by mouth Every Morning Before Breakfast.     Authorizing Provider: BEBE MIDDLETON     Ordering User: MAUREEN MAYO     Refused Prescriptions Disp Refills   • omeprazole (priLOSEC) 40 MG capsule 90 capsule 0     Sig: Take 1 capsule by mouth Every Morning Before Breakfast.     Refused By: YULIYA ABREU     Reason for Refusal: Patient has requested refill too soon

## 2022-05-04 DIAGNOSIS — Z87.19 HISTORY OF BARRETT'S ESOPHAGUS: ICD-10-CM

## 2022-05-04 DIAGNOSIS — K21.9 GERD WITHOUT ESOPHAGITIS: ICD-10-CM

## 2022-05-04 RX ORDER — OMEPRAZOLE 40 MG/1
40 CAPSULE, DELAYED RELEASE ORAL
Qty: 90 CAPSULE | Refills: 1 | Status: SHIPPED | OUTPATIENT
Start: 2022-05-04 | End: 2022-10-25 | Stop reason: SDUPTHER

## 2022-05-12 ENCOUNTER — OFFICE VISIT (OUTPATIENT)
Dept: ORTHOPEDIC SURGERY | Facility: CLINIC | Age: 62
End: 2022-05-12

## 2022-05-12 VITALS — WEIGHT: 182 LBS | HEART RATE: 73 BPM | HEIGHT: 68 IN | BODY MASS INDEX: 27.58 KG/M2 | OXYGEN SATURATION: 98 %

## 2022-05-12 DIAGNOSIS — M70.61 TROCHANTERIC BURSITIS OF RIGHT HIP: Primary | ICD-10-CM

## 2022-05-12 PROCEDURE — 99213 OFFICE O/P EST LOW 20 MIN: CPT | Performed by: PHYSICIAN ASSISTANT

## 2022-05-26 ENCOUNTER — TELEPHONE (OUTPATIENT)
Dept: ORTHOPEDIC SURGERY | Facility: CLINIC | Age: 62
End: 2022-05-26

## 2022-05-26 DIAGNOSIS — M25.512 LEFT SHOULDER PAIN, UNSPECIFIED CHRONICITY: Primary | ICD-10-CM

## 2022-05-26 DIAGNOSIS — M70.61 TROCHANTERIC BURSITIS OF RIGHT HIP: ICD-10-CM

## 2022-05-26 RX ORDER — DIAZEPAM 10 MG/1
TABLET ORAL
Qty: 1 TABLET | Refills: 0 | Status: SHIPPED | OUTPATIENT
Start: 2022-05-26 | End: 2022-06-10

## 2022-05-26 RX ORDER — DICLOFENAC SODIUM 75 MG/1
75 TABLET, DELAYED RELEASE ORAL 2 TIMES DAILY
Qty: 60 TABLET | Refills: 0 | OUTPATIENT
Start: 2022-05-26 | End: 2023-02-07

## 2022-05-26 NOTE — TELEPHONE ENCOUNTER
PATIENT CALLED REQUESTING MED TO HELP WITH CLAUSTROPHOBIA DURING MRI TOMORROW EVENING. [facility] IN [facility]. PATIENT STATES SOMEONE IS GOING TO DRIVE HER.

## 2022-05-27 ENCOUNTER — HOSPITAL ENCOUNTER (OUTPATIENT)
Dept: MRI IMAGING | Facility: HOSPITAL | Age: 62
Discharge: HOME | End: 2022-05-27
Admitting: ORTHOPAEDIC SURGERY

## 2022-05-27 DIAGNOSIS — R20.2 ARM PARESTHESIA, LEFT: ICD-10-CM

## 2022-05-27 DIAGNOSIS — M75.42 IMPINGEMENT SYNDROME OF LEFT SHOULDER: ICD-10-CM

## 2022-05-27 DIAGNOSIS — M25.512 LEFT SHOULDER PAIN, UNSPECIFIED CHRONICITY: ICD-10-CM

## 2022-05-27 PROCEDURE — 73221 MRI JOINT UPR EXTREM W/O DYE: CPT

## 2022-05-31 DIAGNOSIS — R05.9 COUGH: ICD-10-CM

## 2022-06-01 RX ORDER — FLUTICASONE PROPIONATE AND SALMETEROL 50; 500 UG/1; UG/1
POWDER RESPIRATORY (INHALATION)
Qty: 60 EACH | Refills: 0 | Status: SHIPPED | OUTPATIENT
Start: 2022-06-01 | End: 2022-08-05

## 2022-06-01 RX ORDER — PAROXETINE HYDROCHLORIDE 40 MG/1
TABLET, FILM COATED ORAL
Qty: 90 TABLET | Refills: 0 | Status: SHIPPED | OUTPATIENT
Start: 2022-06-01 | End: 2022-10-19 | Stop reason: SDUPTHER

## 2022-06-01 RX ORDER — AZELASTINE HCL 205.5 UG/1
SPRAY NASAL
Qty: 30 ML | Refills: 0 | Status: SHIPPED | OUTPATIENT
Start: 2022-06-01 | End: 2022-08-05

## 2022-06-10 ENCOUNTER — OFFICE VISIT (OUTPATIENT)
Dept: INTERNAL MEDICINE | Facility: CLINIC | Age: 62
End: 2022-06-10

## 2022-06-10 VITALS
RESPIRATION RATE: 24 BRPM | TEMPERATURE: 98.1 F | BODY MASS INDEX: 27.77 KG/M2 | OXYGEN SATURATION: 98 % | HEART RATE: 106 BPM | WEIGHT: 183.2 LBS | HEIGHT: 68 IN | SYSTOLIC BLOOD PRESSURE: 130 MMHG | DIASTOLIC BLOOD PRESSURE: 78 MMHG

## 2022-06-10 DIAGNOSIS — E78.5 HYPERLIPIDEMIA, UNSPECIFIED HYPERLIPIDEMIA TYPE: Primary | ICD-10-CM

## 2022-06-10 DIAGNOSIS — G43.009 MIGRAINE WITHOUT AURA AND WITHOUT STATUS MIGRAINOSUS, NOT INTRACTABLE: ICD-10-CM

## 2022-06-10 DIAGNOSIS — I10 ESSENTIAL HYPERTENSION: ICD-10-CM

## 2022-06-10 LAB
ALBUMIN SERPL-MCNC: 4.1 G/DL (ref 3.5–5.2)
ALBUMIN/GLOB SERPL: 1.2 G/DL
ALP SERPL-CCNC: 119 U/L (ref 39–117)
ALT SERPL W P-5'-P-CCNC: 30 U/L (ref 1–33)
ANION GAP SERPL CALCULATED.3IONS-SCNC: 10.8 MMOL/L (ref 5–15)
AST SERPL-CCNC: 32 U/L (ref 1–32)
BASOPHILS # BLD AUTO: 0.08 10*3/MM3 (ref 0–0.2)
BASOPHILS NFR BLD AUTO: 0.5 % (ref 0–1.5)
BILIRUB SERPL-MCNC: 0.4 MG/DL (ref 0–1.2)
BUN SERPL-MCNC: 14 MG/DL (ref 6–20)
BUN/CREAT SERPL: 14.3 (ref 7–25)
CALCIUM SPEC-SCNC: 9.5 MG/DL (ref 8.6–10.5)
CHLORIDE SERPL-SCNC: 103 MMOL/L (ref 98–107)
CHOLEST SERPL-MCNC: 198 MG/DL (ref 0–200)
CO2 SERPL-SCNC: 22.2 MMOL/L (ref 22–29)
CREAT SERPL-MCNC: 0.98 MG/DL (ref 0.57–1)
DEPRECATED RDW RBC AUTO: 44.3 FL (ref 37–54)
EGFRCR SERPLBLD CKD-EPI 2021: 66.6 ML/MIN/1.73
EOSINOPHIL # BLD AUTO: 0.07 10*3/MM3 (ref 0–0.4)
EOSINOPHIL NFR BLD AUTO: 0.5 % (ref 0.3–6.2)
ERYTHROCYTE [DISTWIDTH] IN BLOOD BY AUTOMATED COUNT: 13.8 % (ref 12.3–15.4)
GLOBULIN UR ELPH-MCNC: 3.5 GM/DL
GLUCOSE SERPL-MCNC: 88 MG/DL (ref 65–99)
HCT VFR BLD AUTO: 41 % (ref 34–46.6)
HDLC SERPL-MCNC: 57 MG/DL (ref 40–60)
HGB BLD-MCNC: 13.5 G/DL (ref 12–15.9)
IMM GRANULOCYTES # BLD AUTO: 0.17 10*3/MM3 (ref 0–0.05)
IMM GRANULOCYTES NFR BLD AUTO: 1.1 % (ref 0–0.5)
LDLC SERPL CALC-MCNC: 108 MG/DL (ref 0–100)
LDLC/HDLC SERPL: 1.8 {RATIO}
LYMPHOCYTES # BLD AUTO: 4.01 10*3/MM3 (ref 0.7–3.1)
LYMPHOCYTES NFR BLD AUTO: 26.6 % (ref 19.6–45.3)
MCH RBC QN AUTO: 28.7 PG (ref 26.6–33)
MCHC RBC AUTO-ENTMCNC: 32.9 G/DL (ref 31.5–35.7)
MCV RBC AUTO: 87.2 FL (ref 79–97)
MONOCYTES # BLD AUTO: 0.94 10*3/MM3 (ref 0.1–0.9)
MONOCYTES NFR BLD AUTO: 6.2 % (ref 5–12)
NEUTROPHILS NFR BLD AUTO: 65.1 % (ref 42.7–76)
NEUTROPHILS NFR BLD AUTO: 9.8 10*3/MM3 (ref 1.7–7)
NRBC BLD AUTO-RTO: 0 /100 WBC (ref 0–0.2)
PLATELET # BLD AUTO: 504 10*3/MM3 (ref 140–450)
PMV BLD AUTO: 9.8 FL (ref 6–12)
POTASSIUM SERPL-SCNC: 4.1 MMOL/L (ref 3.5–5.2)
PROT SERPL-MCNC: 7.6 G/DL (ref 6–8.5)
RBC # BLD AUTO: 4.7 10*6/MM3 (ref 3.77–5.28)
SODIUM SERPL-SCNC: 136 MMOL/L (ref 136–145)
TRIGL SERPL-MCNC: 193 MG/DL (ref 0–150)
TSH SERPL DL<=0.05 MIU/L-ACNC: 1.19 UIU/ML (ref 0.27–4.2)
VLDLC SERPL-MCNC: 33 MG/DL (ref 5–40)
WBC NRBC COR # BLD: 15.07 10*3/MM3 (ref 3.4–10.8)

## 2022-06-10 PROCEDURE — 80050 GENERAL HEALTH PANEL: CPT | Performed by: INTERNAL MEDICINE

## 2022-06-10 PROCEDURE — 80061 LIPID PANEL: CPT | Performed by: INTERNAL MEDICINE

## 2022-06-10 PROCEDURE — 99214 OFFICE O/P EST MOD 30 MIN: CPT | Performed by: INTERNAL MEDICINE

## 2022-06-10 RX ORDER — RIZATRIPTAN BENZOATE 10 MG/1
10 TABLET ORAL ONCE AS NEEDED
Qty: 9 TABLET | Refills: 2 | Status: SHIPPED | OUTPATIENT
Start: 2022-06-10 | End: 2022-10-19 | Stop reason: SDUPTHER

## 2022-06-10 NOTE — PROGRESS NOTES
"Chief Complaint  Hyperlipidemia and Hypertension    Subjective          Shanthi Akers presents to [facility]  History of Present Illness  Presenting for follow up.     HTN:  well controlled today, doing well on medication, denies headache, chest pain, dizziness, vision changes    HLD: on statin, tolerating well, denies muscle pain/weakness    Migraine: Imitrex not working well. Has taken Maxalt in the past which worked well.     Objective   Vital Signs:   /78 (BP Location: Right arm, Patient Position: Sitting, Cuff Size: Adult)   Pulse 106   Temp 98.1 °F (36.7 °C) (Oral)   Resp 24   Ht 171.5 cm (67.5\")   Wt 83.1 kg (183 lb 3.2 oz)   SpO2 98%   BMI 28.27 kg/m²     Physical Exam  Vitals reviewed.   Constitutional:       Appearance: Normal appearance. She is well-developed.   HENT:      Head: Normocephalic and atraumatic.      Mouth/Throat:      Pharynx: No oropharyngeal exudate.   Eyes:      Conjunctiva/sclera: Conjunctivae normal.      Pupils: Pupils are equal, round, and reactive to light.   Cardiovascular:      Rate and Rhythm: Normal rate and regular rhythm.      Heart sounds: No murmur heard.    No friction rub. No gallop.   Pulmonary:      Effort: Pulmonary effort is normal.      Breath sounds: Normal breath sounds. No wheezing or rhonchi.   Skin:     General: Skin is warm and dry.   Neurological:      Mental Status: She is alert and oriented to person, place, and time.   Psychiatric:         Mood and Affect: Affect normal.        Result Review :   The following data was reviewed by: Merced De Luna MD on 06/10/2022:  CMP    CMP 12/14/21 12/28/21 3/8/22   Glucose 104 (A) 101 (A) 106 (A)   BUN 19 17 19   Creatinine 0.92 1.05 (A) 0.93   eGFR Non African Am 62 54 (A)    Sodium 136 131 (A) 136   Potassium 3.8 4.5 4.5   Chloride 99 96 (A) 98   Calcium 9.8 10.1 10.0   Albumin 4.10 4.30 4.40   Total Bilirubin 0.4 0.3 0.5   Alkaline Phosphatase " 143 (A) 176 (A) 128 (A)   AST (SGOT) 32 22 30   ALT (SGPT) 25 20 28   (A) Abnormal value            CBC    CBC 12/14/21 12/28/21 3/8/22   WBC 9.56 14.16 (A) 11.23 (A)   RBC 4.40 4.69 4.62   Hemoglobin 13.4 14.3 13.6   Hematocrit 41.1 43.1 41.3   MCV 93.4 91.9 89.4   MCH 30.5 30.5 29.4   MCHC 32.6 33.2 32.9   RDW 14.1 13.4 12.5   Platelets 411 486 (A) 417   (A) Abnormal value            Lipid Panel    Lipid Panel 9/29/21 3/8/22   Total Cholesterol 318 (A) 217 (A)   Triglycerides 460 (A) 342 (A)   HDL Cholesterol 39 (A) 51   VLDL Cholesterol 94 (A) 58 (A)   LDL Cholesterol  185 (A) 108 (A)   LDL/HDL Ratio 4.79 1.91   (A) Abnormal value            TSH    TSH 9/29/21 3/8/22   TSH 0.688 1.290              Procedures      Assessment and Plan    Diagnoses and all orders for this visit:    1. Hyperlipidemia, unspecified hyperlipidemia type (Primary)  Assessment & Plan:  Doing well on increased statin dose  Checking follow up labs today  Continue current management, will adjust if needed based on lab results    Orders:  -     Comprehensive Metabolic Panel  -     CBC & Differential  -     TSH  -     Lipid Panel    2. Essential hypertension  Assessment & Plan:  Well controlled in clinic today  Continue current management      Orders:  -     Comprehensive Metabolic Panel  -     CBC & Differential  -     TSH  -     Lipid Panel    3. Migraine without aura and without status migrainosus, not intractable  Assessment & Plan:  Imitrex not working well.   Will switch to Maxalt as this has worked well for her in the past.     Orders:  -     rizatriptan (Maxalt) 10 MG tablet; Take 1 tablet by mouth 1 (One) Time As Needed for Migraine for up to 1 dose. May repeat in 2 hours if needed  Dispense: 9 tablet; Refill: 2            Follow Up   Return in about 3 months (around 9/10/2022) for Next scheduled follow up, with Charo Carty.  Patient was given instructions and counseling regarding her condition or for health maintenance advice.  Please see specific information pulled into the AVS if appropriate.

## 2022-06-10 NOTE — ASSESSMENT & PLAN NOTE
Doing well on increased statin dose  Checking follow up labs today  Continue current management, will adjust if needed based on lab results

## 2022-06-22 RX ORDER — LISINOPRIL 30 MG/1
TABLET ORAL
Qty: 90 TABLET | Refills: 0 | Status: SHIPPED | OUTPATIENT
Start: 2022-06-22 | End: 2022-10-07 | Stop reason: SDUPTHER

## 2022-08-04 DIAGNOSIS — R05.9 COUGH: ICD-10-CM

## 2022-08-05 RX ORDER — AZELASTINE HYDROCHLORIDE 137 UG/1
SPRAY, METERED NASAL
Qty: 30 ML | Refills: 0 | OUTPATIENT
Start: 2022-08-05 | End: 2023-02-07

## 2022-08-05 RX ORDER — FLUTICASONE PROPIONATE AND SALMETEROL 50; 500 UG/1; UG/1
POWDER RESPIRATORY (INHALATION)
Qty: 60 EACH | Refills: 0 | Status: SHIPPED | OUTPATIENT
Start: 2022-08-05 | End: 2022-10-07 | Stop reason: SDUPTHER

## 2022-09-14 PROCEDURE — U0004 COV-19 TEST NON-CDC HGH THRU: HCPCS | Performed by: FAMILY MEDICINE

## 2022-09-15 ENCOUNTER — TELEPHONE (OUTPATIENT)
Dept: URGENT CARE | Facility: CLINIC | Age: 62
End: 2022-09-15

## 2022-09-15 NOTE — TELEPHONE ENCOUNTER
----- Message from Jenna Soto MD sent at 9/15/2022  9:10 AM EDT -----  Please call the patient regarding negative Covid PCR test.

## 2022-09-16 ENCOUNTER — TELEPHONE (OUTPATIENT)
Dept: URGENT CARE | Facility: CLINIC | Age: 62
End: 2022-09-16

## 2022-10-04 ENCOUNTER — TELEPHONE (OUTPATIENT)
Dept: INTERNAL MEDICINE | Facility: CLINIC | Age: 62
End: 2022-10-04

## 2022-10-04 RX ORDER — FLUTICASONE PROPIONATE AND SALMETEROL 500; 50 UG/1; UG/1
1 POWDER RESPIRATORY (INHALATION) 2 TIMES DAILY
Qty: 60 EACH | Refills: 0 | Status: CANCELLED | OUTPATIENT
Start: 2022-10-04

## 2022-10-04 RX ORDER — LISINOPRIL 30 MG/1
30 TABLET ORAL DAILY
Qty: 90 TABLET | Refills: 0 | Status: CANCELLED | OUTPATIENT
Start: 2022-10-04

## 2022-10-04 NOTE — TELEPHONE ENCOUNTER
Caller: Shanthi Akers    Relationship: Self    Best call back number: [phone]    Requested Prescriptions:   Requested Prescriptions     Pending Prescriptions Disp Refills   • lisinopril (PRINIVIL,ZESTRIL) 30 MG tablet 90 tablet 0     Sig: Take 1 tablet by mouth Daily.   • Fluticasone-Salmeterol (Advair Diskus) 500-50 MCG/ACT DISKUS 60 each 0     Sig: Inhale 1 puff 2 (Two) Times a Day.        Pharmacy where request should be sent: [place], [place] - [address] [place] [address] [phone] [facility] [phone] FX         Does the patient have less than a 3 day supply:  [x] Yes  [] No    Will Ballesteros Rep   10/04/22 11:59 EDT

## 2022-10-07 RX ORDER — FLUTICASONE PROPIONATE AND SALMETEROL 500; 50 UG/1; UG/1
1 POWDER RESPIRATORY (INHALATION) 2 TIMES DAILY
Qty: 60 EACH | Refills: 0 | Status: SHIPPED | OUTPATIENT
Start: 2022-10-07 | End: 2022-10-19 | Stop reason: SDUPTHER

## 2022-10-07 RX ORDER — LISINOPRIL 30 MG/1
30 TABLET ORAL DAILY
Qty: 90 TABLET | Refills: 0 | Status: SHIPPED | OUTPATIENT
Start: 2022-10-07 | End: 2022-10-19

## 2022-10-19 ENCOUNTER — OFFICE VISIT (OUTPATIENT)
Dept: INTERNAL MEDICINE | Facility: CLINIC | Age: 62
End: 2022-10-19

## 2022-10-19 VITALS
DIASTOLIC BLOOD PRESSURE: 82 MMHG | BODY MASS INDEX: 28.79 KG/M2 | TEMPERATURE: 97.9 F | OXYGEN SATURATION: 98 % | HEART RATE: 88 BPM | WEIGHT: 190 LBS | HEIGHT: 68 IN | SYSTOLIC BLOOD PRESSURE: 142 MMHG

## 2022-10-19 DIAGNOSIS — T78.40XS ALLERGY, SEQUELA: ICD-10-CM

## 2022-10-19 DIAGNOSIS — E87.6 HYPOKALEMIA: Primary | ICD-10-CM

## 2022-10-19 DIAGNOSIS — E78.5 HYPERLIPIDEMIA, UNSPECIFIED HYPERLIPIDEMIA TYPE: ICD-10-CM

## 2022-10-19 DIAGNOSIS — G43.009 MIGRAINE WITHOUT AURA AND WITHOUT STATUS MIGRAINOSUS, NOT INTRACTABLE: ICD-10-CM

## 2022-10-19 LAB
ALBUMIN SERPL-MCNC: 4 G/DL (ref 3.5–5.2)
ALBUMIN/GLOB SERPL: 1.3 G/DL
ALP SERPL-CCNC: 144 U/L (ref 39–117)
ALT SERPL W P-5'-P-CCNC: 62 U/L (ref 1–33)
ANION GAP SERPL CALCULATED.3IONS-SCNC: 12.1 MMOL/L (ref 5–15)
AST SERPL-CCNC: 61 U/L (ref 1–32)
BILIRUB SERPL-MCNC: 0.3 MG/DL (ref 0–1.2)
BUN SERPL-MCNC: 8 MG/DL (ref 8–23)
BUN/CREAT SERPL: 7.9 (ref 7–25)
CALCIUM SPEC-SCNC: 9.8 MG/DL (ref 8.6–10.5)
CHLORIDE SERPL-SCNC: 101 MMOL/L (ref 98–107)
CHOLEST SERPL-MCNC: 253 MG/DL (ref 0–200)
CO2 SERPL-SCNC: 25.9 MMOL/L (ref 22–29)
CREAT SERPL-MCNC: 1.01 MG/DL (ref 0.57–1)
EGFRCR SERPLBLD CKD-EPI 2021: 63.9 ML/MIN/1.73
GLOBULIN UR ELPH-MCNC: 3 GM/DL
GLUCOSE SERPL-MCNC: 125 MG/DL (ref 65–99)
HDLC SERPL-MCNC: 45 MG/DL (ref 40–60)
LDLC SERPL CALC-MCNC: 167 MG/DL (ref 0–100)
LDLC/HDLC SERPL: 3.65 {RATIO}
POTASSIUM SERPL-SCNC: 4.6 MMOL/L (ref 3.5–5.2)
PROT SERPL-MCNC: 7 G/DL (ref 6–8.5)
SODIUM SERPL-SCNC: 139 MMOL/L (ref 136–145)
TRIGL SERPL-MCNC: 218 MG/DL (ref 0–150)
VLDLC SERPL-MCNC: 41 MG/DL (ref 5–40)

## 2022-10-19 PROCEDURE — 99214 OFFICE O/P EST MOD 30 MIN: CPT | Performed by: NURSE PRACTITIONER

## 2022-10-19 PROCEDURE — 80053 COMPREHEN METABOLIC PANEL: CPT | Performed by: NURSE PRACTITIONER

## 2022-10-19 PROCEDURE — 80061 LIPID PANEL: CPT | Performed by: NURSE PRACTITIONER

## 2022-10-19 RX ORDER — RIZATRIPTAN BENZOATE 10 MG/1
10 TABLET ORAL ONCE AS NEEDED
Qty: 9 TABLET | Refills: 2 | Status: SHIPPED | OUTPATIENT
Start: 2022-10-19 | End: 2023-02-28

## 2022-10-19 RX ORDER — CETIRIZINE HYDROCHLORIDE, PSEUDOEPHEDRINE HYDROCHLORIDE 5; 120 MG/1; MG/1
1 TABLET, FILM COATED, EXTENDED RELEASE ORAL 2 TIMES DAILY
Qty: 30 TABLET | Refills: 0 | Status: SHIPPED | OUTPATIENT
Start: 2022-10-19

## 2022-10-19 RX ORDER — CETIRIZINE HYDROCHLORIDE 10 MG/1
10 TABLET ORAL DAILY
Qty: 90 TABLET | Refills: 0 | Status: SHIPPED | OUTPATIENT
Start: 2022-11-09

## 2022-10-19 RX ORDER — PAROXETINE HYDROCHLORIDE 40 MG/1
40 TABLET, FILM COATED ORAL EVERY MORNING
Qty: 90 TABLET | Refills: 1 | Status: SHIPPED | OUTPATIENT
Start: 2022-10-19

## 2022-10-19 RX ORDER — LOSARTAN POTASSIUM 25 MG/1
25 TABLET ORAL DAILY
Qty: 30 TABLET | Refills: 1 | Status: SHIPPED | OUTPATIENT
Start: 2022-10-19 | End: 2022-12-19

## 2022-10-19 RX ORDER — POTASSIUM CHLORIDE 20 MEQ/1
20 TABLET, EXTENDED RELEASE ORAL DAILY
Qty: 90 TABLET | Refills: 0 | Status: SHIPPED | OUTPATIENT
Start: 2022-10-19 | End: 2022-10-25 | Stop reason: SDUPTHER

## 2022-10-19 RX ORDER — MONTELUKAST SODIUM 10 MG/1
10 TABLET ORAL DAILY
Qty: 90 TABLET | Refills: 1 | Status: SHIPPED | OUTPATIENT
Start: 2022-10-19

## 2022-10-19 RX ORDER — FLUTICASONE PROPIONATE AND SALMETEROL 500; 50 UG/1; UG/1
1 POWDER RESPIRATORY (INHALATION) 2 TIMES DAILY
Qty: 60 EACH | Refills: 0 | Status: SHIPPED | OUTPATIENT
Start: 2022-10-19 | End: 2022-12-08

## 2022-10-19 NOTE — PROGRESS NOTES
"Chief Complaint  Follow-up (Follow up on medications /Been really dizzy, headaches and nose bleeds about every day /Wants flu shot and covid booster/)    Subjective        Shanthi Akers presents to [facility] for History of Present Illness  Follow-up for chronic conditions, need for medications, headaches, nosebleeds, and to discuss flu and COVID shots.    Patient is here today primarily because she is out of medication, she has not called and requested refills for several of her medications.  She has not had follow-up with her PCP in quite some time.    Patient with hypertension, she was on lisinopril 30 mg daily, she stopped taking that about a month ago, she thought it was the cause for cough.  Patient has not had any cough since stopping the medication.  She has been on lisinopril for quite some time.  She would like to switch medications today.    Patient is out of her potassium replacement medication, she was started on this when she was drastically low.  Her last few results have been normal potassium.    Patient is due for cholesterol check, she would like to have that done today.    Patient has been having frequent nosebleeds, almost every day.  She is able to get them to stop easily.  She has been off of her Singulair and her Advair, no nasal sprays.  She has also had headaches, this is happened more so since stopping her blood pressure medication.  She has also had some issues with dizziness, this is lessened since stopping her lisinopril.    She is also out of her Paxil, she is needing that refilled today as well.  Does not have any other significant concerns or symptoms today       Objective   Vital Signs:   /82 (BP Location: Right arm, Patient Position: Sitting, Cuff Size: Adult)   Pulse 88   Temp 97.9 °F (36.6 °C) (Infrared)   Ht 171.5 cm (67.5\")   Wt 86.2 kg (190 lb)   SpO2 98%   BMI 29.32 kg/m²     Physical Exam  Vitals and nursing note reviewed. "   Constitutional:       Appearance: Normal appearance. She is normal weight.   HENT:      Head: Normocephalic and atraumatic.      Right Ear: Tympanic membrane, ear canal and external ear normal.      Left Ear: Tympanic membrane, ear canal and external ear normal.      Nose:      Comments: There are some dried blood noted in both nasal passages, otherwise no concerning findings     Mouth/Throat:      Mouth: Mucous membranes are moist.      Pharynx: Oropharynx is clear.   Eyes:      Pupils: Pupils are equal, round, and reactive to light.   Pulmonary:      Effort: Pulmonary effort is normal.   Neurological:      Mental Status: She is alert.   Psychiatric:         Mood and Affect: Mood normal.         Thought Content: Thought content normal.        Result Review :  {The following data was reviewed by AMANDA Antoine on 10/19/22                Diagnoses and all orders for this visit:    1. Hypokalemia (Primary)  -     Comprehensive Metabolic Panel    2. Allergy, sequela  -     montelukast (SINGULAIR) 10 MG tablet; Take 1 tablet by mouth Daily.  Dispense: 90 tablet; Refill: 1    3. Migraine without aura and without status migrainosus, not intractable  -     rizatriptan (Maxalt) 10 MG tablet; Take 1 tablet by mouth 1 (One) Time As Needed for Migraine for up to 1 dose. May repeat in 2 hours if needed  Dispense: 9 tablet; Refill: 2    4. Hyperlipidemia, unspecified hyperlipidemia type  -     Lipid Panel    Other orders  -     losartan (Cozaar) 25 MG tablet; Take 1 tablet by mouth Daily.  Dispense: 30 tablet; Refill: 1  -     Fluticasone-Salmeterol (Advair Diskus) 500-50 MCG/ACT DISKUS; Inhale 1 puff 2 (Two) Times a Day.  Dispense: 60 each; Refill: 0  -     potassium chloride (K-DUR,KLOR-CON) 20 MEQ CR tablet; Take 1 tablet by mouth Daily.  Dispense: 90 tablet; Refill: 0  -     PARoxetine (PAXIL) 40 MG tablet; Take 1 tablet by mouth Every Morning.  Dispense: 90 tablet; Refill: 1  -     cetirizine-pseudoephedrine  (ZyrTEC-D) 5-120 MG per 12 hr tablet; Take 1 tablet by mouth 2 (Two) Times a Day.  Dispense: 30 tablet; Refill: 0  -     cetirizine (zyrTEC) 10 MG tablet; Take 1 tablet by mouth Daily.  Dispense: 90 tablet; Refill: 0    Will start patient on losartan, she is already completely stopped taking her lisinopril.  She will need to monitor blood pressure, she needs to schedule follow-up with her PCP in 3 months.  Advised patient to go ahead and start back with her Advair Diskus and will send in cetirizine and Singulair for her allergic rhinitis, she is going to use 2 weeks of Zyrtec-D, and then start cetirizine daily.  We will send in potassium supplementation, will check metabolic panel today.  Also checking lipid panel today.  Refill for her anxiety depression medication.    Again I strongly encouraged her to go ahead and schedule a 3-month follow-up with her PCP.  Patient agrees and understands with plan of care.      Follow Up   Return in about 3 months (around 1/19/2023) for Recheck.  Patient was given instructions and counseling regarding her condition or for health maintenance advice. Please see specific information pulled into the AVS if appropriate.     AMANDA Antoine  10/19/2022  This note was electronically signed.

## 2022-10-25 DIAGNOSIS — Z87.19 HISTORY OF BARRETT'S ESOPHAGUS: ICD-10-CM

## 2022-10-25 DIAGNOSIS — K21.9 GERD WITHOUT ESOPHAGITIS: ICD-10-CM

## 2022-10-25 NOTE — TELEPHONE ENCOUNTER
Caller: Shanthi Akers    Relationship: Self    Best call back number: [phone]    Requested Prescriptions:   Requested Prescriptions     Pending Prescriptions Disp Refills   • omeprazole (priLOSEC) 40 MG capsule 90 capsule 1     Sig: Take 1 capsule by mouth Every Morning Before Breakfast.   • HM Omeprazole 20 MG tablet delayed-release 30 each      Sig: Take 20 mg by mouth Every Evening.   • potassium chloride (K-DUR,KLOR-CON) 20 MEQ CR tablet 90 tablet 0     Sig: Take 1 tablet by mouth Daily.        Pharmacy where request should be sent: [id number] - [place], [place] - [address] [facility] - [phone] [facility] [phone] FX     Additional details provided by patient: PATIENT IS COMPLETELY OUT OF MEDICATION. PLEASE CALL NEW PRESCRIPTIONS INTO PHARMACY WITH REFILLS ASAP TODAY. PATIENT SAW MARIO REBOLLAR LAST.    Does the patient have less than a 3 day supply:  [x] Yes  [] No    Will Ballard Rep   10/25/22 15:41 EDT

## 2022-10-26 RX ORDER — OMEPRAZOLE 20 MG/1
1 TABLET, DELAYED RELEASE ORAL EVERY EVENING
Qty: 30 EACH | OUTPATIENT
Start: 2022-10-26

## 2022-10-26 RX ORDER — POTASSIUM CHLORIDE 20 MEQ/1
20 TABLET, EXTENDED RELEASE ORAL DAILY
Qty: 90 TABLET | Refills: 0 | Status: SHIPPED | OUTPATIENT
Start: 2022-10-26

## 2022-10-26 RX ORDER — OMEPRAZOLE 40 MG/1
40 CAPSULE, DELAYED RELEASE ORAL
Qty: 90 CAPSULE | Refills: 1 | Status: SHIPPED | OUTPATIENT
Start: 2022-10-26

## 2022-11-11 RX ORDER — LISINOPRIL 30 MG/1
TABLET ORAL
Qty: 90 TABLET | Refills: 0 | OUTPATIENT
Start: 2022-11-11

## 2022-12-08 RX ORDER — FLUTICASONE PROPIONATE AND SALMETEROL 500; 50 UG/1; UG/1
POWDER RESPIRATORY (INHALATION)
Qty: 60 EACH | Refills: 0 | Status: SHIPPED | OUTPATIENT
Start: 2022-12-08 | End: 2022-12-27

## 2022-12-19 RX ORDER — LOSARTAN POTASSIUM 25 MG/1
TABLET ORAL
Qty: 90 TABLET | Refills: 1 | Status: SHIPPED | OUTPATIENT
Start: 2022-12-19

## 2022-12-27 RX ORDER — FLUTICASONE PROPIONATE AND SALMETEROL 50; 500 UG/1; UG/1
POWDER RESPIRATORY (INHALATION)
Qty: 60 EACH | Refills: 0 | Status: SHIPPED | OUTPATIENT
Start: 2022-12-27

## 2023-02-15 DIAGNOSIS — E78.5 HYPERLIPIDEMIA, UNSPECIFIED HYPERLIPIDEMIA TYPE: ICD-10-CM

## 2023-02-15 RX ORDER — ATORVASTATIN CALCIUM 40 MG/1
TABLET, FILM COATED ORAL
Qty: 90 TABLET | Refills: 1 | Status: SHIPPED | OUTPATIENT
Start: 2023-02-15

## 2023-02-27 DIAGNOSIS — G43.009 MIGRAINE WITHOUT AURA AND WITHOUT STATUS MIGRAINOSUS, NOT INTRACTABLE: ICD-10-CM

## 2023-02-28 RX ORDER — RIZATRIPTAN BENZOATE 10 MG/1
10 TABLET ORAL ONCE AS NEEDED
Qty: 9 TABLET | Refills: 2 | Status: SHIPPED | OUTPATIENT
Start: 2023-02-28

## 2023-05-04 DIAGNOSIS — Z87.19 HISTORY OF BARRETT'S ESOPHAGUS: ICD-10-CM

## 2023-05-04 DIAGNOSIS — K21.9 GERD WITHOUT ESOPHAGITIS: ICD-10-CM

## 2023-05-04 RX ORDER — OMEPRAZOLE 40 MG/1
CAPSULE, DELAYED RELEASE ORAL
Qty: 90 CAPSULE | Refills: 1 | Status: SHIPPED | OUTPATIENT
Start: 2023-05-04

## 2023-05-12 ENCOUNTER — PRIOR AUTHORIZATION (OUTPATIENT)
Dept: INTERNAL MEDICINE | Facility: CLINIC | Age: 63
End: 2023-05-12
Payer: COMMERCIAL

## 2023-05-15 NOTE — TELEPHONE ENCOUNTER
This medication or product is on your plan's list of covered drugs. Prior authorization is not required at this time

## 2023-06-06 ENCOUNTER — TRANSCRIBE ORDERS (OUTPATIENT)
Dept: ADMINISTRATIVE | Facility: HOSPITAL | Age: 63
End: 2023-06-06
Payer: COMMERCIAL

## 2023-06-06 ENCOUNTER — HOSPITAL ENCOUNTER (OUTPATIENT)
Dept: GENERAL RADIOLOGY | Facility: HOSPITAL | Age: 63
Discharge: HOME | End: 2023-06-06
Payer: COMMERCIAL

## 2023-06-06 ENCOUNTER — LAB (OUTPATIENT)
Dept: LAB | Facility: HOSPITAL | Age: 63
End: 2023-06-06
Payer: COMMERCIAL

## 2023-06-06 DIAGNOSIS — G95.9 CERVICAL MYELOPATHY: ICD-10-CM

## 2023-06-06 DIAGNOSIS — G95.9 CERVICAL MYELOPATHY: Primary | ICD-10-CM

## 2023-06-06 LAB
ANION GAP SERPL CALCULATED.3IONS-SCNC: 10 MMOL/L (ref 5–15)
APTT PPP: 32.4 SECONDS (ref 24.2–34.2)
BASOPHILS # BLD AUTO: 0.05 10*3/MM3 (ref 0–0.2)
BASOPHILS NFR BLD AUTO: 0.6 % (ref 0–1.5)
BILIRUB UR QL STRIP: NEGATIVE
BUN SERPL-MCNC: 15 MG/DL (ref 8–23)
BUN/CREAT SERPL: 17 (ref 7–25)
CALCIUM SPEC-SCNC: 8.9 MG/DL (ref 8.6–10.5)
CHLORIDE SERPL-SCNC: 101 MMOL/L (ref 98–107)
CLARITY UR: CLEAR
CO2 SERPL-SCNC: 25 MMOL/L (ref 22–29)
COLOR UR: YELLOW
CREAT SERPL-MCNC: 0.88 MG/DL (ref 0.57–1)
DEPRECATED RDW RBC AUTO: 41.2 FL (ref 37–54)
EGFRCR SERPLBLD CKD-EPI 2021: 75.3 ML/MIN/1.73
EOSINOPHIL # BLD AUTO: 0.12 10*3/MM3 (ref 0–0.4)
EOSINOPHIL NFR BLD AUTO: 1.3 % (ref 0.3–6.2)
ERYTHROCYTE [DISTWIDTH] IN BLOOD BY AUTOMATED COUNT: 13.5 % (ref 12.3–15.4)
GLUCOSE SERPL-MCNC: 118 MG/DL (ref 65–99)
GLUCOSE UR STRIP-MCNC: NEGATIVE MG/DL
HCT VFR BLD AUTO: 33.3 % (ref 34–46.6)
HGB BLD-MCNC: 11.2 G/DL (ref 12–15.9)
HGB UR QL STRIP.AUTO: NEGATIVE
IMM GRANULOCYTES # BLD AUTO: 0.06 10*3/MM3 (ref 0–0.05)
IMM GRANULOCYTES NFR BLD AUTO: 0.7 % (ref 0–0.5)
INR PPP: 1.14 (ref 0.86–1.15)
KETONES UR QL STRIP: NEGATIVE
LEUKOCYTE ESTERASE UR QL STRIP.AUTO: ABNORMAL
LYMPHOCYTES # BLD AUTO: 1.39 10*3/MM3 (ref 0.7–3.1)
LYMPHOCYTES NFR BLD AUTO: 15.4 % (ref 19.6–45.3)
MCH RBC QN AUTO: 29.1 PG (ref 26.6–33)
MCHC RBC AUTO-ENTMCNC: 33.6 G/DL (ref 31.5–35.7)
MCV RBC AUTO: 86.5 FL (ref 79–97)
MONOCYTES # BLD AUTO: 0.44 10*3/MM3 (ref 0.1–0.9)
MONOCYTES NFR BLD AUTO: 4.9 % (ref 5–12)
NEUTROPHILS NFR BLD AUTO: 6.98 10*3/MM3 (ref 1.7–7)
NEUTROPHILS NFR BLD AUTO: 77.1 % (ref 42.7–76)
NITRITE UR QL STRIP: NEGATIVE
NRBC BLD AUTO-RTO: 0.1 /100 WBC (ref 0–0.2)
PH UR STRIP.AUTO: 8 [PH] (ref 5–8)
PLATELET # BLD AUTO: 354 10*3/MM3 (ref 140–450)
PMV BLD AUTO: 9.5 FL (ref 6–12)
POTASSIUM SERPL-SCNC: 4.1 MMOL/L (ref 3.5–5.2)
PROT UR QL STRIP: ABNORMAL
PROTHROMBIN TIME: 14.7 SECONDS (ref 11.8–14.9)
RBC # BLD AUTO: 3.85 10*6/MM3 (ref 3.77–5.28)
SODIUM SERPL-SCNC: 136 MMOL/L (ref 136–145)
SP GR UR STRIP: 1.01 (ref 1–1.03)
UROBILINOGEN UR QL STRIP: ABNORMAL
WBC NRBC COR # BLD: 9.04 10*3/MM3 (ref 3.4–10.8)

## 2023-06-06 PROCEDURE — 85025 COMPLETE CBC W/AUTO DIFF WBC: CPT

## 2023-06-06 PROCEDURE — 81001 URINALYSIS AUTO W/SCOPE: CPT

## 2023-06-06 PROCEDURE — 36415 COLL VENOUS BLD VENIPUNCTURE: CPT

## 2023-06-06 PROCEDURE — 85730 THROMBOPLASTIN TIME PARTIAL: CPT

## 2023-06-06 PROCEDURE — 80048 BASIC METABOLIC PNL TOTAL CA: CPT

## 2023-06-06 PROCEDURE — 85610 PROTHROMBIN TIME: CPT

## 2023-06-07 LAB
BACTERIA UR QL AUTO: NORMAL /HPF
HYALINE CASTS UR QL AUTO: NORMAL /LPF
RBC # UR STRIP: NORMAL /HPF
REF LAB TEST METHOD: NORMAL
SQUAMOUS #/AREA URNS HPF: NORMAL /HPF
WBC # UR STRIP: NORMAL /HPF

## 2023-09-11 ENCOUNTER — TRANSCRIBE ORDERS (OUTPATIENT)
Dept: ADMINISTRATIVE | Facility: HOSPITAL | Age: 63
End: 2023-09-11
Payer: COMMERCIAL

## 2023-09-11 DIAGNOSIS — R07.9 CHEST PAIN, UNSPECIFIED TYPE: Primary | ICD-10-CM

## 2023-10-04 RX ORDER — LOSARTAN POTASSIUM 25 MG/1
TABLET ORAL
Qty: 30 TABLET | Refills: 2 | OUTPATIENT
Start: 2023-10-04

## 2023-10-04 NOTE — TELEPHONE ENCOUNTER
Refill request for losartan 25mg.      ARB Protocol [id number]/04/2023 06:06 AM   Protocol Details BP under 140/90 in past year    No active pregnancy on record    Normal serum potassium on file within the past year    No positive pregnancy test in past 12 months    Patient is not on Entresto    Patient is not on concurrent ACE    Normal serum creatinine on file within the past year    Visit with authorizing provider in past 12 months or upcoming 30 days

## 2023-11-05 DIAGNOSIS — T78.40XS ALLERGY, SEQUELA: ICD-10-CM

## 2023-11-06 RX ORDER — MONTELUKAST SODIUM 10 MG/1
10 TABLET ORAL DAILY
Qty: 30 TABLET | Refills: 2 | Status: SHIPPED | OUTPATIENT
Start: 2023-11-06

## 2023-11-15 PROCEDURE — 87081 CULTURE SCREEN ONLY: CPT | Performed by: NURSE PRACTITIONER

## 2023-11-18 ENCOUNTER — TELEPHONE (OUTPATIENT)
Dept: URGENT CARE | Facility: CLINIC | Age: 63
End: 2023-11-18
Payer: COMMERCIAL

## 2023-11-18 NOTE — TELEPHONE ENCOUNTER
----- Message from John Calvin Cooksey, PA-C sent at 11/18/2023  9:08 AM EST -----  Please call patient regarding their negative beta hemolytic strep throat culture. This means that they most likely do not have strep throat. They should follow-up with a primary care provider as needed, or if their symptoms worsen, or do not improve.     Thank you,     -John Cooksey, PA-C

## 2024-01-19 DIAGNOSIS — G43.009 MIGRAINE WITHOUT AURA AND WITHOUT STATUS MIGRAINOSUS, NOT INTRACTABLE: ICD-10-CM

## 2024-01-22 RX ORDER — RIZATRIPTAN BENZOATE 10 MG/1
10 TABLET ORAL ONCE
Qty: 4 TABLET | OUTPATIENT
Start: 2024-01-22 | End: 2024-01-22

## 2024-01-25 DIAGNOSIS — G43.009 MIGRAINE WITHOUT AURA AND WITHOUT STATUS MIGRAINOSUS, NOT INTRACTABLE: ICD-10-CM

## 2024-01-26 ENCOUNTER — TRANSCRIBE ORDERS (OUTPATIENT)
Dept: ADMINISTRATIVE | Facility: HOSPITAL | Age: 64
End: 2024-01-26
Payer: COMMERCIAL

## 2024-01-26 DIAGNOSIS — Z12.31 SCREENING MAMMOGRAM FOR HIGH-RISK PATIENT: Primary | ICD-10-CM

## 2024-01-26 RX ORDER — RIZATRIPTAN BENZOATE 10 MG/1
10 TABLET ORAL ONCE
Qty: 4 TABLET | OUTPATIENT
Start: 2024-01-26 | End: 2024-01-26

## 2024-02-05 DIAGNOSIS — T78.40XS ALLERGY, SEQUELA: ICD-10-CM

## 2024-02-06 RX ORDER — MONTELUKAST SODIUM 10 MG/1
10 TABLET ORAL DAILY
Qty: 30 TABLET | Refills: 2 | OUTPATIENT
Start: 2024-02-06

## 2024-02-07 ENCOUNTER — TELEPHONE (OUTPATIENT)
Dept: URGENT CARE | Facility: CLINIC | Age: 64
End: 2024-02-07

## 2024-02-08 ENCOUNTER — OFFICE VISIT (OUTPATIENT)
Dept: PODIATRY | Facility: CLINIC | Age: 64
End: 2024-02-08
Payer: COMMERCIAL

## 2024-02-08 VITALS
HEART RATE: 113 BPM | DIASTOLIC BLOOD PRESSURE: 79 MMHG | TEMPERATURE: 97.7 F | SYSTOLIC BLOOD PRESSURE: 125 MMHG | HEIGHT: 67 IN | OXYGEN SATURATION: 94 % | WEIGHT: 208 LBS | BODY MASS INDEX: 32.65 KG/M2

## 2024-02-08 DIAGNOSIS — S82.65XA CLOSED NONDISPLACED FRACTURE OF LATERAL MALLEOLUS OF LEFT FIBULA, INITIAL ENCOUNTER: Primary | ICD-10-CM

## 2024-02-08 DIAGNOSIS — S93.402A RUPTURE OF LIGAMENT OF ANKLE, LEFT, INITIAL ENCOUNTER: ICD-10-CM

## 2024-02-08 NOTE — TELEPHONE ENCOUNTER
Called patient to review x-ray results, no answer, message left for patient to return my call.  Radiologist did see a very small, nondisplaced lateral malleolus fracture.  The patient is already in a cam walking boot.  She should continue her current plan of care, but does need to follow-up with podiatry or orthopedics.  I went ahead and put in a referral to podiatry today because this does not look like it would be of any surgical need.  The patient should call their office tomorrow morning to set up an appointment at [phone].  If the patient would prefer referral to orthopedics we can certainly make that change in the system.

## 2024-02-26 ENCOUNTER — HOSPITAL ENCOUNTER (OUTPATIENT)
Dept: MAMMOGRAPHY | Facility: HOSPITAL | Age: 64
Discharge: HOME | End: 2024-02-26
Admitting: INTERNAL MEDICINE
Payer: COMMERCIAL

## 2024-02-26 ENCOUNTER — PATIENT ROUNDING (BHMG ONLY) (OUTPATIENT)
Dept: URGENT CARE | Facility: CLINIC | Age: 64
End: 2024-02-26
Payer: COMMERCIAL

## 2024-02-26 DIAGNOSIS — Z12.31 SCREENING MAMMOGRAM FOR HIGH-RISK PATIENT: ICD-10-CM

## 2024-02-26 PROCEDURE — 77063 BREAST TOMOSYNTHESIS BI: CPT

## 2024-02-26 PROCEDURE — 77067 SCR MAMMO BI INCL CAD: CPT

## 2024-02-26 NOTE — ED NOTES
Thank you for letting us care for you in your recent visit to our urgent care center. We would love to hear about your experience with us. Was this the first time you have visited our location?    We’re always looking for ways to make our patients’ experiences even better. Do you have any recommendations on ways we may improve?     I appreciate you taking the time to respond. Please be on the lookout for a survey about your recent visit from [employer] via text or email. We would greatly appreciate if you could fill that out and turn it back in. We want your voice to be heard and we value your feedback.   Thank you for choosing [facility] for your healthcare needs.

## 2024-03-07 ENCOUNTER — OFFICE VISIT (OUTPATIENT)
Dept: PODIATRY | Facility: CLINIC | Age: 64
End: 2024-03-07
Payer: OTHER MISCELLANEOUS

## 2024-03-07 VITALS
WEIGHT: 208 LBS | HEIGHT: 67 IN | TEMPERATURE: 96.4 F | BODY MASS INDEX: 32.65 KG/M2 | HEART RATE: 100 BPM | OXYGEN SATURATION: 98 % | DIASTOLIC BLOOD PRESSURE: 79 MMHG | SYSTOLIC BLOOD PRESSURE: 133 MMHG

## 2024-03-07 DIAGNOSIS — S93.402D RUPTURE OF LIGAMENT OF ANKLE, LEFT, SUBSEQUENT ENCOUNTER: Primary | ICD-10-CM

## 2024-03-07 DIAGNOSIS — S82.65XA CLOSED NONDISPLACED FRACTURE OF LATERAL MALLEOLUS OF LEFT FIBULA, INITIAL ENCOUNTER: ICD-10-CM

## 2024-03-07 NOTE — PROGRESS NOTES
[facility] - PODIATRY    Today's Date: 03/07/24    Patient Name: Shanthi Akers  MRN: [id number]  CSN: [id number]  PCP: Romeo Mukherjee MD,   Referring Provider: No ref. provider found    SUBJECTIVE     Chief Complaint   Patient presents with    Left Ankle - Follow-up, Fracture     Continues with CAM walker  still some discomfort and swelling     HPI: Shanthi Akers, a 63 y.o.female, presents to clinic.    Patient is a 63-year-old female presenting with left ankle pain.  Patient states that she twisted her ankle on 2/5/2024 patient states she went to urgent care and they diagnosed her with a break in her ankle.  Patient states that it has been swollen and painful.  Patient states she is here for further treatment.    3/7/2024-patient is here for follow-up of her left ankle.  Patient states it continues to give her issues and she is having trouble walking with it.    Past Medical History:   Diagnosis Date    Ankle pain, left     Asthma     Broken bones     Chronic allergic rhinitis     Depression with anxiety     Essential hypertension     GERD (gastroesophageal reflux disease)     Hyperlipidemia     Migraine headache     Numbness and tingling of leg     right thigh    Psychiatric disorder     Seasonal allergies     Shortness of breath     Sinus trouble      Past Surgical History:   Procedure Laterality Date    BACK SURGERY      COLON SURGERY      COLONOSCOPY N/A 04/12/2022    Procedure: COLONOSCOPY;  Surgeon: Ishmael Ramirez MD;  Location: [facility] ENDOSCOPY;  Service: General;  Laterality: N/A;  NORMAL COLONOSCOPY    ENDOMETRIAL ABLATION      ENDOSCOPY      ENDOSCOPY N/A 04/12/2022    Procedure: ESOPHAGOGASTRODUODENOSCOPY WITH BIOPSIES;  Surgeon: Ishmael Ramirez MD;  Location: [facility] ENDOSCOPY;  Service: General;  Laterality: N/A;  HISTORY CONTEH'S ESOPHAGUS    HAMMER TOE REPAIR Left     CHI'S NEUROMA EXCISION Right      Family History   Problem Relation Age of Onset    Heart failure  Mother     Hyperlipidemia Mother     Hypertension Mother     Rheumatic fever Mother     Stroke Mother     Heart attack Mother     Melanoma Brother     Malig Hyperthermia Neg Hx      Social History     Socioeconomic History    Marital status:    Tobacco Use    Smoking status: Never     Passive exposure: Never    Smokeless tobacco: Never   Vaping Use    Vaping status: Never Used   Substance and Sexual Activity    Alcohol use: Not Currently     Comment: occ wine    Drug use: Never    Sexual activity: Defer     Allergies   Allergen Reactions    Metronidazole Hives and Itching    Tetracycline Other (See Comments), Rash and Hives     Skin peeling on palms and feet    Meloxicam Nausea Only     Current Outpatient Medications   Medication Sig Dispense Refill    Advair Diskus 500-50 MCG/ACT DISKUS INHALE 1 PUFF TWICE A DAY 60 each 0    albuterol (ACCUNEB) 1.25 MG/3ML nebulizer solution Take 3 mL by nebulization Every 4 (Four) Hours As Needed for Wheezing or Shortness of Air (And cough). 20 each 0    aspirin 81 MG EC tablet Take 1 tablet by mouth Daily.      atorvastatin (LIPITOR) 40 MG tablet TAKE 1 TABLET BY MOUTH EVERY DAY 90 tablet 1    linaclotide (LINZESS) 145 MCG capsule capsule Take 1 capsule by mouth Daily.      losartan (COZAAR) 50 MG tablet       montelukast (SINGULAIR) 10 MG tablet TAKE 1 TABLET BY MOUTH DAILY 30 tablet 2    omeprazole (priLOSEC) 40 MG capsule TAKE 1 CAPSULE BY MOUTH EVERY DAY IN THE MORNING BEFORE BREAKFAST 90 capsule 1    PARoxetine (PAXIL) 40 MG tablet Take 1 tablet by mouth Every Morning. 90 tablet 1    Prenatal Multivit-Min-Fe-FA (Prenatal Forte) tablet Take 1 tablet by mouth Daily.      ProAir  (90 Base) MCG/ACT inhaler INHALE 2 PUFFS EVERY 4 (FOUR) HOURS AS NEEDED FOR WHEEZING. 8.5 g 2    rizatriptan (MAXALT) 10 MG tablet TAKE 1 TABLET BY MOUTH 1 (ONE) TIME AS NEEDED FOR MIGRAINE FOR UP TO 1 DOSE. MAY REPEAT IN 2 HOURS IF NEEDED 9 tablet 2    VITAMIN D, CHOLECALCIFEROL, PO Take   by mouth.      diclofenac (VOLTAREN) 50 MG EC tablet Take 1 tablet by mouth 2 (Two) Times a Day for 30 days. 60 tablet 0     No current facility-administered medications for this visit.     Review of Systems   Constitutional: Negative.    HENT: Negative.     Eyes: Negative.    Respiratory: Negative.     Cardiovascular: Negative.    Gastrointestinal: Negative.    Endocrine: Negative.    Genitourinary: Negative.    Musculoskeletal: Negative.         Left ankle pain   Skin: Negative.    Allergic/Immunologic: Negative.    Neurological: Negative.    Hematological: Negative.    Psychiatric/Behavioral: Negative.     All other systems reviewed and are negative.      OBJECTIVE     Vitals:    03/07/24 0804   BP: 133/79   Pulse: 100   Temp: 96.4 °F (35.8 °C)   SpO2: 98%       WBC   Date Value Ref Range Status   06/06/2023 9.04 3.40 - 10.80 10*3/mm3 Final     RBC   Date Value Ref Range Status   06/06/2023 3.85 3.77 - 5.28 10*6/mm3 Final     Hemoglobin   Date Value Ref Range Status   06/06/2023 11.2 (L) 12.0 - 15.9 g/dL Final     Hematocrit   Date Value Ref Range Status   06/06/2023 33.3 (L) 34.0 - 46.6 % Final     MCV   Date Value Ref Range Status   06/06/2023 86.5 79.0 - 97.0 fL Final     MCH   Date Value Ref Range Status   06/06/2023 29.1 26.6 - 33.0 pg Final     MCHC   Date Value Ref Range Status   06/06/2023 33.6 31.5 - 35.7 g/dL Final     RDW   Date Value Ref Range Status   06/06/2023 13.5 12.3 - 15.4 % Final     RDW-SD   Date Value Ref Range Status   06/06/2023 41.2 37.0 - 54.0 fl Final     MPV   Date Value Ref Range Status   06/06/2023 9.5 6.0 - 12.0 fL Final     Platelets   Date Value Ref Range Status   06/06/2023 354 140 - 450 10*3/mm3 Final     Neutrophil %   Date Value Ref Range Status   06/06/2023 77.1 (H) 42.7 - 76.0 % Final     Lymphocyte %   Date Value Ref Range Status   06/06/2023 15.4 (L) 19.6 - 45.3 % Final     Monocyte %   Date Value Ref Range Status   06/06/2023 4.9 (L) 5.0 - 12.0 % Final     Eosinophil %    Date Value Ref Range Status   06/06/2023 1.3 0.3 - 6.2 % Final     Basophil %   Date Value Ref Range Status   06/06/2023 0.6 0.0 - 1.5 % Final     Immature Grans %   Date Value Ref Range Status   06/06/2023 0.7 (H) 0.0 - 0.5 % Final     Neutrophils Absolute   Date Value Ref Range Status   06/16/2023 4.8 1.7 - 6.0 x10(3)/ul Final     Lymphocytes, Absolute   Date Value Ref Range Status   06/06/2023 1.39 0.70 - 3.10 10*3/mm3 Final     Monocytes, Absolute   Date Value Ref Range Status   06/06/2023 0.44 0.10 - 0.90 10*3/mm3 Final     Eosinophils Absolute   Date Value Ref Range Status   06/16/2023 0.2 0.0 - 0.6 x10(3)/ul Final     Basophils Absolute   Date Value Ref Range Status   06/16/2023 0.1 0.0 - 0.3 x10(3)/ul Final     Immature Grans, Absolute   Date Value Ref Range Status   06/06/2023 0.06 (H) 0.00 - 0.05 10*3/mm3 Final     nRBC   Date Value Ref Range Status   06/06/2023 0.1 0.0 - 0.2 /100 WBC Final         Lab Results   Component Value Date    GLUCOSE 118 (H) 06/06/2023    BUN 15 06/06/2023    CREATININE 0.88 06/06/2023    EGFRIFNONA 54 (L) 12/28/2021    BCR 17.0 06/06/2023    K 4.1 06/06/2023    CO2 25.0 06/06/2023    CALCIUM 8.9 06/06/2023    ALBUMIN 4.00 10/19/2022    LABIL2 1.1 (L) 04/19/2021    AST 61 (H) 10/19/2022    ALT 62 (H) 10/19/2022       Patient seen in no apparent distress.      PHYSICAL EXAM:     Foot/Ankle Exam    GENERAL  Appearance:  appears stated age  Orientation:  AAOx3  Affect:  appropriate  Gait:  unimpaired  Assistance:  independent  Right shoe gear: casual shoe  Left shoe gear: casual shoe    VASCULAR     Right Foot Vascularity   Normal vascular exam    Dorsalis pedis:  2+  Posterior tibial:  2+  Skin temperature:  warm  Edema grading:  None  CFT:  < 3 seconds  Pedal hair growth:  Present  Varicosities:  none     Left Foot Vascularity   Normal vascular exam    Dorsalis pedis:  2+  Posterior tibial:  2+  Skin temperature:  warm  Edema grading:  None  CFT:  < 3 seconds  Pedal hair growth:   Present  Varicosities:  none     NEUROLOGIC     Right Foot Neurologic   Normal sensation    Light touch sensation: normal  Vibratory sensation: normal  Hot/Cold sensation: normal  Protective Sensation using [place] Monofilament:   Sites intact: 10  Sites tested: 10     Left Foot Neurologic   Normal sensation    Light touch sensation: normal  Vibratory sensation: normal  Hot/Cold sensation:  normal  Protective Sensation using [place] Monofilament:   Sites intact: 10  Sites tested: 10    MUSCULOSKELETAL     Left Foot Musculoskeletal   Tenderness:  anterior talofibular ligament tenderness and lateral malleolus tenderness    MUSCLE STRENGTH     Right Foot Muscle Strength   Foot dorsiflexion:  4  Foot plantar flexion:  4  Foot inversion:  4  Foot eversion:  4     Left Foot Muscle Strength   Foot dorsiflexion:  4  Foot plantar flexion:  4  Foot inversion:  4  Foot eversion:  4    RANGE OF MOTION     Right Foot Range of Motion   Foot and ankle ROM within normal limits       Left Foot Range of Motion   Foot and ankle ROM within normal limits    Ankle dorsiflexion: with pain  Ankle plantar flexion: with pain  Foot eversion: with pain  Foot inversion: with pain    DERMATOLOGIC      Right Foot Dermatologic   Skin  Right foot skin is intact.      Left Foot Dermatologic   Skin  Left foot skin is intact.       RADIOLOGY:          Mammo Screening Digital Tomosynthesis Bilateral With CAD    Result Date: 2/27/2024  Narrative: PROCEDURE: MAMMO SCREENING DIGITAL TOMOSYNTHESIS BILATERAL W CAD  COMPARISON: Other, , DIGITAL SCREENING W/CAD, 4/14/2014, 14:14.  Other, , DIGITAL SCREENING W/CAD, 6/17/2015, 18:09.  [facility], , DIG SCREENING BILAT YESSENIA W 3D GELACIO, 2/05/2021, 15:30.  [facility], , MAMMO SCREENING DIGITAL TOMOSYNTHESIS BILATERAL W CAD, 3/17/2022, 8:15.  VIEWS:  BILATERAL CC AND MLO VIEWS WERE OBTAINED UTILIZING 3D TOMOSYNTHESIS AND R2 CAD SOFTWARE  INDICATIONS:  Screening.  FINDINGS:  6 mm focal asymmetry in the outer central right breast, middle depth.  5 mm focal asymmetry in the upper outer left breast, anterior to middle depth.  Biopsy clip in the right breast.      Impression:  Bilateral focal asymmetries.  Recommend further evaluation with bilateral diagnostic mammogram and possible bilateral breast ultrasound if needed.  RECOMMENDATION(S):  ADDITIONAL MAMMOGRAPHIC VIEWS REQUIRED: BILATERAL BREASTS  --NEED ADDITIONAL IMAGING EVALUATION.   ULTRASOUND: BILATERAL BREASTS  --NEED ADDITIONAL IMAGING EVALUATION.   BIRADS:  DIAGNOSTIC CATEGORY 0--INCOMPLETE: NEEDS ADDITIONAL IMAGING EVALUATION.   BREAST COMPOSITION: Scattered areas fibroglandular density.  PLEASE NOTE:  A NORMAL MAMMOGRAM DOES NOT EXCLUDE THE POSSIBILITY OF BREAST CANCER. ANY CLINICALLY SUSPICIOUS PALPABLE LUMP SHOULD BE BIOPSIED.      MYLES QUINONES MD       Electronically Signed and Approved By: MYLES QUINONES MD on 2/27/2024 at 11:42             XR Foot 3+ View Left    Result Date: 2/7/2024  Narrative: PROCEDURE: XR FOOT 3+ VW LEFT  COMPARISON: None  INDICATIONS: Left foot pain, swelling and bruising after fall on 2/5/24.  FINDINGS:  No fractures are visualized.  Mild degenerative change consistent with osteoarthritis is seen in the tarsal region and 1st MTP joint.  Healed fracture of the distal 2nd metatarsal is evident.  Screws are seen in the distal 3rd and 4th metatarsals.  No heel spurs are seen.  No foreign body is evident.      Impression:   Left foot series demonstrating no acute bony abnormality.      MARGA RUBI MD       Electronically Signed and Approved By: MARGA RUBI MD on 2/07/2024 at 20:21             XR Ankle 3+ View Left    Result Date: 2/7/2024  Narrative: PROCEDURE: XR ANKLE 3+ VW LEFT  COMPARISON: None  INDICATIONS: Left ankle pain, swelling and bruising after fall on 2/5/24.  FINDINGS:  Nondisplaced fracture of the tip of the lateral malleolus is evident.  Soft tissue  swelling is evident.  Fluid or blood is seen in the tibiotalar joint.  Mild degenerative change consistent with osteoarthritis is seen in the tibiotalar joint.      Impression:   Left ankle series demonstrating nondisplaced fracture of the tip of the lateral malleolus.  Soft tissue swelling and joint fluid or blood.      MARGA RUBI MD       Electronically Signed and Approved By: MARGA RUBI MD on 2/07/2024 at 20:20              ASSESSMENT/PLAN     Diagnoses and all orders for this visit:    1. Rupture of ligament of ankle, left, subsequent encounter (Primary)  -     MRI Ankle Left Without Contrast; Future    2. Closed nondisplaced fracture of lateral malleolus of left fibula, initial encounter  -     MRI Ankle Left Without Contrast; Future    Other orders  -     diclofenac (VOLTAREN) 50 MG EC tablet; Take 1 tablet by mouth 2 (Two) Times a Day for 30 days.  Dispense: 60 tablet; Refill: 0      Patient to continue stretching exercises and icing in the evening as tolerated. Discussed compression therapy and resting the extremity.  Anti-inflammatory medication to begin taking if okay by PCP.    MRI ordered    Return to clinic in 3 to 4 weeks    Comprehensive lower extremity examination and evaluation was performed.    Discussed findings and treatment plan including risks, benefits, and treatment options with patient in detail. Patient agreed with treatment plan.    Medications and allergies reviewed.  Reviewed available lab values along with other pertinent labs.  These were discussed with the patient.    An After Visit Summary was printed and given to the patient at discharge, including (if requested) any available informative/educational handouts regarding diagnosis, treatment, or medications. All questions were answered to patient/family satisfaction. Should symptoms fail to improve or worsen they agree to call or return to clinic or to go to the Emergency Department. Discussed the importance of following up  with any needed screening tests/labs/specialist appointments and any requested follow-up recommended by me today. Importance of maintaining follow-up discussed and patient accepts that missed appointments can delay diagnosis and potentially lead to worsening of conditions.    Return in about 1 month (around 4/7/2024)., or sooner if acute issues arise.    This document has been electronically signed by Quan Chahal DPM on March 7, 2024 12:33 EST

## 2024-04-03 ENCOUNTER — OFFICE VISIT (OUTPATIENT)
Dept: PODIATRY | Facility: CLINIC | Age: 64
End: 2024-04-03
Payer: OTHER MISCELLANEOUS

## 2024-04-03 VITALS
WEIGHT: 208 LBS | DIASTOLIC BLOOD PRESSURE: 74 MMHG | SYSTOLIC BLOOD PRESSURE: 128 MMHG | TEMPERATURE: 97.1 F | BODY MASS INDEX: 32.65 KG/M2 | OXYGEN SATURATION: 96 % | HEIGHT: 67 IN | HEART RATE: 112 BPM

## 2024-04-03 DIAGNOSIS — S93.402D RUPTURE OF LIGAMENT OF ANKLE, LEFT, SUBSEQUENT ENCOUNTER: Primary | ICD-10-CM

## 2024-04-03 DIAGNOSIS — S82.65XD CLOSED NONDISPLACED FRACTURE OF LATERAL MALLEOLUS OF LEFT FIBULA WITH ROUTINE HEALING, SUBSEQUENT ENCOUNTER: ICD-10-CM

## 2024-04-03 RX ORDER — ALBUTEROL SULFATE AND BUDESONIDE 90; 80 UG/1; UG/1
AEROSOL, METERED RESPIRATORY (INHALATION)
COMMUNITY
Start: 2024-03-25

## 2024-04-04 NOTE — PROGRESS NOTES
[facility] - PODIATRY    Today's Date: 04/04/24    Patient Name: Shanthi Akers  MRN: [id number]  CSN: [id number]  PCP: Romeo Mukherjee MD,   Referring Provider: No ref. provider found    SUBJECTIVE     Chief Complaint   Patient presents with    Left Ankle - Follow-up     Here to discuss MRI results still has swelling but doing better     HPI: Shanthi Akers, a 63 y.o.female, presents to clinic.    Patient is a 63-year-old female presenting with left ankle pain.  Patient states that she twisted her ankle on 2/5/2024 patient states she went to urgent care and they diagnosed her with a break in her ankle.  Patient states that it has been swollen and painful.  Patient states she is here for further treatment.    3/7/2024-patient is here for follow-up of her left ankle.  Patient states it continues to give her issues and she is having trouble walking with it.    4/3/2024-patient states that her ankle is feeling better.  Has returned to normal activity.    Past Medical History:   Diagnosis Date    Ankle pain, left     Asthma     Broken bones     Chronic allergic rhinitis     Depression with anxiety     Essential hypertension     GERD (gastroesophageal reflux disease)     Hyperlipidemia     Migraine headache     Numbness and tingling of leg     right thigh    Psychiatric disorder     Seasonal allergies     Shortness of breath     Sinus trouble      Past Surgical History:   Procedure Laterality Date    BACK SURGERY      COLON SURGERY      COLONOSCOPY N/A 04/12/2022    Procedure: COLONOSCOPY;  Surgeon: Ishmael Ramirez MD;  Location: [facility] ENDOSCOPY;  Service: General;  Laterality: N/A;  NORMAL COLONOSCOPY    ENDOMETRIAL ABLATION      ENDOSCOPY      ENDOSCOPY N/A 04/12/2022    Procedure: ESOPHAGOGASTRODUODENOSCOPY WITH BIOPSIES;  Surgeon: Ishmael Ramirez MD;  Location: [facility] ENDOSCOPY;  Service: General;  Laterality: N/A;  HISTORY CONTEH'S ESOPHAGUS    HAMMER TOE REPAIR Left     CHI'S NEUROMA  EXCISION Right      Family History   Problem Relation Age of Onset    Heart failure Mother     Hyperlipidemia Mother     Hypertension Mother     Rheumatic fever Mother     Stroke Mother     Heart attack Mother     Melanoma Brother     Malig Hyperthermia Neg Hx      Social History     Socioeconomic History    Marital status:    Tobacco Use    Smoking status: Never     Passive exposure: Never    Smokeless tobacco: Never   Vaping Use    Vaping status: Never Used   Substance and Sexual Activity    Alcohol use: Not Currently     Comment: occ wine    Drug use: Never    Sexual activity: Defer     Allergies   Allergen Reactions    Metronidazole Hives and Itching    Tetracycline Other (See Comments), Rash and Hives     Skin peeling on palms and feet    Meloxicam Nausea Only     Current Outpatient Medications   Medication Sig Dispense Refill    Advair Diskus 500-50 MCG/ACT DISKUS INHALE 1 PUFF TWICE A DAY 60 each 0    Airsupra 90-80 MCG/ACT aerosol       albuterol (ACCUNEB) 1.25 MG/3ML nebulizer solution Take 3 mL by nebulization Every 4 (Four) Hours As Needed for Wheezing or Shortness of Air (And cough). 20 each 0    aspirin 81 MG EC tablet Take 1 tablet by mouth Daily.      aspirin-acetaminophen-caffeine (EXCEDRIN MIGRAINE) 250-250-65 MG per tablet Take 1 tablet by mouth Every 6 (Six) Hours As Needed.      atorvastatin (LIPITOR) 40 MG tablet TAKE 1 TABLET BY MOUTH EVERY DAY 90 tablet 1    diclofenac (VOLTAREN) 50 MG EC tablet Take 1 tablet by mouth 2 (Two) Times a Day for 30 days. 60 tablet 0    linaclotide (LINZESS) 145 MCG capsule capsule Take 1 capsule by mouth Daily.      losartan (COZAAR) 50 MG tablet       montelukast (SINGULAIR) 10 MG tablet TAKE 1 TABLET BY MOUTH DAILY 30 tablet 2    omeprazole (priLOSEC) 40 MG capsule TAKE 1 CAPSULE BY MOUTH EVERY DAY IN THE MORNING BEFORE BREAKFAST 90 capsule 1    PARoxetine (PAXIL) 40 MG tablet Take 1 tablet by mouth Every Morning. 90 tablet 1    Prenatal  Multivit-Min-Fe-FA (Prenatal Forte) tablet Take 1 tablet by mouth Daily.      ProAir  (90 Base) MCG/ACT inhaler INHALE 2 PUFFS EVERY 4 (FOUR) HOURS AS NEEDED FOR WHEEZING. 8.5 g 2    rizatriptan (MAXALT) 10 MG tablet TAKE 1 TABLET BY MOUTH 1 (ONE) TIME AS NEEDED FOR MIGRAINE FOR UP TO 1 DOSE. MAY REPEAT IN 2 HOURS IF NEEDED 9 tablet 2    VITAMIN D, CHOLECALCIFEROL, PO Take  by mouth.       No current facility-administered medications for this visit.     Review of Systems   Constitutional: Negative.    HENT: Negative.     Eyes: Negative.    Respiratory: Negative.     Cardiovascular: Negative.    Gastrointestinal: Negative.    Endocrine: Negative.    Genitourinary: Negative.    Musculoskeletal: Negative.         Left ankle pain   Skin: Negative.    Allergic/Immunologic: Negative.    Neurological: Negative.    Hematological: Negative.    Psychiatric/Behavioral: Negative.     All other systems reviewed and are negative.      OBJECTIVE     Vitals:    04/03/24 1323   BP: 128/74   Pulse: 112   Temp: 97.1 °F (36.2 °C)   SpO2: 96%       WBC   Date Value Ref Range Status   06/06/2023 9.04 3.40 - 10.80 10*3/mm3 Final     RBC   Date Value Ref Range Status   06/06/2023 3.85 3.77 - 5.28 10*6/mm3 Final     Hemoglobin   Date Value Ref Range Status   06/06/2023 11.2 (L) 12.0 - 15.9 g/dL Final     Hematocrit   Date Value Ref Range Status   06/06/2023 33.3 (L) 34.0 - 46.6 % Final     MCV   Date Value Ref Range Status   06/06/2023 86.5 79.0 - 97.0 fL Final     MCH   Date Value Ref Range Status   06/06/2023 29.1 26.6 - 33.0 pg Final     MCHC   Date Value Ref Range Status   06/06/2023 33.6 31.5 - 35.7 g/dL Final     RDW   Date Value Ref Range Status   06/06/2023 13.5 12.3 - 15.4 % Final     RDW-SD   Date Value Ref Range Status   06/06/2023 41.2 37.0 - 54.0 fl Final     MPV   Date Value Ref Range Status   06/06/2023 9.5 6.0 - 12.0 fL Final     Platelets   Date Value Ref Range Status   06/06/2023 354 140 - 450 10*3/mm3 Final      Neutrophil %   Date Value Ref Range Status   06/06/2023 77.1 (H) 42.7 - 76.0 % Final     Lymphocyte %   Date Value Ref Range Status   06/06/2023 15.4 (L) 19.6 - 45.3 % Final     Monocyte %   Date Value Ref Range Status   06/06/2023 4.9 (L) 5.0 - 12.0 % Final     Eosinophil %   Date Value Ref Range Status   06/06/2023 1.3 0.3 - 6.2 % Final     Basophil %   Date Value Ref Range Status   06/06/2023 0.6 0.0 - 1.5 % Final     Immature Grans %   Date Value Ref Range Status   06/06/2023 0.7 (H) 0.0 - 0.5 % Final     Neutrophils Absolute   Date Value Ref Range Status   06/16/2023 4.8 1.7 - 6.0 x10(3)/ul Final     Lymphocytes, Absolute   Date Value Ref Range Status   06/06/2023 1.39 0.70 - 3.10 10*3/mm3 Final     Monocytes, Absolute   Date Value Ref Range Status   06/06/2023 0.44 0.10 - 0.90 10*3/mm3 Final     Eosinophils Absolute   Date Value Ref Range Status   06/16/2023 0.2 0.0 - 0.6 x10(3)/ul Final     Basophils Absolute   Date Value Ref Range Status   06/16/2023 0.1 0.0 - 0.3 x10(3)/ul Final     Immature Grans, Absolute   Date Value Ref Range Status   06/06/2023 0.06 (H) 0.00 - 0.05 10*3/mm3 Final     nRBC   Date Value Ref Range Status   06/06/2023 0.1 0.0 - 0.2 /100 WBC Final         Lab Results   Component Value Date    GLUCOSE 118 (H) 06/06/2023    BUN 15 06/06/2023    CREATININE 0.88 06/06/2023    EGFRIFNONA 54 (L) 12/28/2021    BCR 17.0 06/06/2023    K 4.1 06/06/2023    CO2 25.0 06/06/2023    CALCIUM 8.9 06/06/2023    ALBUMIN 4.00 10/19/2022    LABIL2 1.1 (L) 04/19/2021    AST 61 (H) 10/19/2022    ALT 62 (H) 10/19/2022       Patient seen in no apparent distress.      PHYSICAL EXAM:     Foot/Ankle Exam    GENERAL  Appearance:  appears stated age  Orientation:  AAOx3  Affect:  appropriate  Gait:  unimpaired  Assistance:  independent  Right shoe gear: casual shoe  Left shoe gear: casual shoe    VASCULAR     Right Foot Vascularity   Normal vascular exam    Dorsalis pedis:  2+  Posterior tibial:  2+  Skin  temperature:  warm  Edema grading:  None  CFT:  < 3 seconds  Pedal hair growth:  Present  Varicosities:  none     Left Foot Vascularity   Normal vascular exam    Dorsalis pedis:  2+  Posterior tibial:  2+  Skin temperature:  warm  Edema grading:  None  CFT:  < 3 seconds  Pedal hair growth:  Present  Varicosities:  none     NEUROLOGIC     Right Foot Neurologic   Normal sensation    Light touch sensation: normal  Vibratory sensation: normal  Hot/Cold sensation: normal  Protective Sensation using [place] Monofilament:   Sites intact: 10  Sites tested: 10     Left Foot Neurologic   Normal sensation    Light touch sensation: normal  Vibratory sensation: normal  Hot/Cold sensation:  normal  Protective Sensation using [place] Monofilament:   Sites intact: 10  Sites tested: 10    MUSCULOSKELETAL     Left Foot Musculoskeletal   Tenderness:  anterior talofibular ligament tenderness and lateral malleolus tenderness    MUSCLE STRENGTH     Right Foot Muscle Strength   Foot dorsiflexion:  4  Foot plantar flexion:  4  Foot inversion:  4  Foot eversion:  4     Left Foot Muscle Strength   Foot dorsiflexion:  4  Foot plantar flexion:  4  Foot inversion:  4  Foot eversion:  4    RANGE OF MOTION     Right Foot Range of Motion   Foot and ankle ROM within normal limits       Left Foot Range of Motion   Foot and ankle ROM within normal limits    Ankle dorsiflexion: with pain  Ankle plantar flexion: with pain  Foot eversion: with pain  Foot inversion: with pain    DERMATOLOGIC      Right Foot Dermatologic   Skin  Right foot skin is intact.      Left Foot Dermatologic   Skin  Left foot skin is intact.       RADIOLOGY:          US breast limited right    Result Date: 3/28/2024  Narrative: EXAM: 1. Bilateral digital diagnostic mammogram with CAD. 2. Bilateral digital diagnostic breast tomosynthesis. 3. Diagnostic bilateral breast ultrasound. INDICATION: 63 years-old female with abnormal screening mammogram COMPARISON:  Screening mammogram 02/26/2024 FINDINGS:   MAMMOGRAM: Diagnostic images of the Bilateral breast(s) were obtained in the following views: True lateral views of both breasts. Spot compression views of both breasts in the CC and MLO projections with 3-D tomosynthesis. The mammographic images were interpreted with the assistance of computer aided detection system. There are scattered fibroglandular densities throughout both breasts.. Right: Persistent 6 mm mass in the posterior upper outer quadrant of the right breast. No associated microcalcification. Left: Suspected persistent mass in the anterior upper outer quadrant of the left breast, measuring approximately 3 mm. This finding is not well placed on the lateral spot compression view. No associated microcalcification. ULTRASOUND: Grayscale and color Doppler ultrasound of the bilateral breast was performed.   Right: In the right breast at the 9:00 position 10 cm from the nipple there is a probable benign intramammary lymph node measuring 4 mm. Left: In the left breast at the 1:00 position 4 cm from the nipple there is a benign cyst measuring 4 mm. IMPRESSION: Benign bilateral diagnostic mammogram and ultrasound. BI-RADS 2: Benign Findings RECOMMENDATIONS: Annual screening mammogram. Women over the age of 40 undergoing screening mammography are entered into a reminder system with target due date for the next mammogram. Dictated by: Wiley León MD Signed by Wiley León MD on 3/28/2024 9:30 ##### Final ##### Dictated by:    WILEY LEÓN MD-RAD Dictated DT/TM: 03/28/2024 9:30 am Interpreted and electronically signed by:  WILEY LEÓN MD-RAD Signed DT/TM:  03/28/2024 9:30 am    US breast limited left    Result Date: 3/28/2024  Narrative: EXAM: 1. Bilateral digital diagnostic mammogram with CAD. 2. Bilateral digital diagnostic breast tomosynthesis. 3. Diagnostic bilateral breast ultrasound. INDICATION: 63 years-old female with abnormal screening mammogram COMPARISON:  Screening mammogram 02/26/2024 FINDINGS:   MAMMOGRAM: Diagnostic images of the Bilateral breast(s) were obtained in the following views: True lateral views of both breasts. Spot compression views of both breasts in the CC and MLO projections with 3-D tomosynthesis. The mammographic images were interpreted with the assistance of computer aided detection system. There are scattered fibroglandular densities throughout both breasts.. Right: Persistent 6 mm mass in the posterior upper outer quadrant of the right breast. No associated microcalcification. Left: Suspected persistent mass in the anterior upper outer quadrant of the left breast, measuring approximately 3 mm. This finding is not well placed on the lateral spot compression view. No associated microcalcification. ULTRASOUND: Grayscale and color Doppler ultrasound of the bilateral breast was performed.   Right: In the right breast at the 9:00 position 10 cm from the nipple there is a probable benign intramammary lymph node measuring 4 mm. Left: In the left breast at the 1:00 position 4 cm from the nipple there is a benign cyst measuring 4 mm. IMPRESSION: Benign bilateral diagnostic mammogram and ultrasound. BI-RADS 2: Benign Findings RECOMMENDATIONS: Annual screening mammogram. Women over the age of 40 undergoing screening mammography are entered into a reminder system with target due date for the next mammogram. Dictated by: Wiley León MD Signed by Wiley León MD on 3/28/2024 9:30 ##### Final ##### Dictated by:    WILEY LEÓN MD-RAD Dictated DT/TM: 03/28/2024 9:30 am Interpreted and electronically signed by:  WILEY LEÓN MD-RAD Signed DT/TM:  03/28/2024 9:30 am    Mammo Diagnostic Bilateral With CAD    Result Date: 3/28/2024  Narrative: EXAM: 1. Bilateral digital diagnostic mammogram with CAD. 2. Bilateral digital diagnostic breast tomosynthesis. 3. Diagnostic bilateral breast ultrasound. INDICATION: 63 years-old female with abnormal screening mammogram  COMPARISON: Screening mammogram 02/26/2024 FINDINGS:   MAMMOGRAM: Diagnostic images of the Bilateral breast(s) were obtained in the following views: True lateral views of both breasts. Spot compression views of both breasts in the CC and MLO projections with 3-D tomosynthesis. The mammographic images were interpreted with the assistance of computer aided detection system. There are scattered fibroglandular densities throughout both breasts.. Right: Persistent 6 mm mass in the posterior upper outer quadrant of the right breast. No associated microcalcification. Left: Suspected persistent mass in the anterior upper outer quadrant of the left breast, measuring approximately 3 mm. This finding is not well placed on the lateral spot compression view. No associated microcalcification. ULTRASOUND: Grayscale and color Doppler ultrasound of the bilateral breast was performed.   Right: In the right breast at the 9:00 position 10 cm from the nipple there is a probable benign intramammary lymph node measuring 4 mm. Left: In the left breast at the 1:00 position 4 cm from the nipple there is a benign cyst measuring 4 mm. IMPRESSION: Benign bilateral diagnostic mammogram and ultrasound. BI-RADS 2: Benign Findings RECOMMENDATIONS: Annual screening mammogram. Women over the age of 40 undergoing screening mammography are entered into a reminder system with target due date for the next mammogram. Dictated by: Wiley León MD Signed by Wiley León MD on 3/28/2024 9:30 ##### Final ##### Dictated by:    WILEY LEÓN MD-RAD Dictated DT/TM: 03/28/2024 9:30 am Interpreted and electronically signed by:  WILEY LEÓN MD-RAD Signed DT/TM:  03/28/2024 9:30 am    US abdomen complete    Result Date: 3/14/2024  Narrative: This result has an attachment that is not available.    XR cervical spine 2 or 3 views    Result Date: 3/12/2024  Narrative: EXAM: CR Spine Cervical 2 or 3 Vws NUMBER OF IMAGES:  2 INDICATION: History of spinal surgery  Technique: 2 views of the cervical spine dated 3/12/2024 15:01. Comparison: Comparison is made to plain film radiograph of the cervical spine dated 12/06/2023. Findings: The cervical spine is visualized from C1-C7. There is straightening of the normal cervical lordosis which may be secondary to patient positioning versus muscle spasm. Postsurgical changes are identified in the cervical spine, consistent with an anterior spinal fusion procedure from C5 to C7. Intervertebral disc spacers are identified at C5-C6 and C6-C7. Orthopedic hardware is intact. There is no evidence of hardware loosening. No prevertebral soft tissue swelling is seen. There is a minimal grade 1 anterolisthesis of C3 on C4. No acute fracture is identified. The vertebral body and disc space heights are unchanged. There is anterior endplate osteophytosis at C4. And C5 Impression:   1. Stable postsurgical and degenerative changes of the cervical spine, as above. 2. Minimal grade 1 anterolisthesis of C3 on C4. 3. Straightening of the normal cervical lordosis. Dictated by: Emerson Fatima MD Signed by Emerson Fatima MD on 3/13/2024 13:04 ##### Final ##### Dictated by:    EMERSON FATIMA MD-RAD Dictated DT/TM: 03/13/2024 1:04 pm Interpreted and electronically signed by:  EMERSON FATIMA MD-RAD Signed DT/TM:  03/13/2024 1:04 pm    XR Ankle 3+ View Left    Result Date: 3/11/2024  Narrative: CLINICAL: Left ankle pain FINDINGS: AP, lateral, and oblique views were obtained of the left ankle. The bones are intact. The ankle mortise is congruent. The bones, joints, and soft tissues appear normal.     Impression: Negative ankle. Comparison views: No      ASSESSMENT/PLAN     Diagnoses and all orders for this visit:    1. Rupture of ligament of ankle, left, subsequent encounter (Primary)    2. Closed nondisplaced fracture of lateral malleolus of left fibula with routine healing, subsequent encounter      Patient to continue stretching exercises and icing in the evening  as tolerated. Discussed compression therapy and resting the extremity.  Anti-inflammatory medication to begin taking if okay by PCP.    MRI reviewed, patient doing well.  Return to clinic in 3 months and should be at [facility] at that time.    Comprehensive lower extremity examination and evaluation was performed.    Discussed findings and treatment plan including risks, benefits, and treatment options with patient in detail. Patient agreed with treatment plan.    Medications and allergies reviewed.  Reviewed available lab values along with other pertinent labs.  These were discussed with the patient.    An After Visit Summary was printed and given to the patient at discharge, including (if requested) any available informative/educational handouts regarding diagnosis, treatment, or medications. All questions were answered to patient/family satisfaction. Should symptoms fail to improve or worsen they agree to call or return to clinic or to go to the Emergency Department. Discussed the importance of following up with any needed screening tests/labs/specialist appointments and any requested follow-up recommended by me today. Importance of maintaining follow-up discussed and patient accepts that missed appointments can delay diagnosis and potentially lead to worsening of conditions.    Return if symptoms worsen or fail to improve., or sooner if acute issues arise.    This document has been electronically signed by Quan Chahal DPM on April 4, 2024 07:31 EDT

## 2024-07-08 ENCOUNTER — OFFICE VISIT (OUTPATIENT)
Dept: PODIATRY | Facility: CLINIC | Age: 64
End: 2024-07-08
Payer: OTHER MISCELLANEOUS

## 2024-07-08 VITALS
DIASTOLIC BLOOD PRESSURE: 81 MMHG | OXYGEN SATURATION: 98 % | BODY MASS INDEX: 30.38 KG/M2 | TEMPERATURE: 97.5 F | WEIGHT: 194 LBS | SYSTOLIC BLOOD PRESSURE: 115 MMHG | HEART RATE: 110 BPM

## 2024-07-08 DIAGNOSIS — S93.402D RUPTURE OF LIGAMENT OF ANKLE, LEFT, SUBSEQUENT ENCOUNTER: ICD-10-CM

## 2024-07-08 DIAGNOSIS — S82.65XD CLOSED NONDISPLACED FRACTURE OF LATERAL MALLEOLUS OF LEFT FIBULA WITH ROUTINE HEALING, SUBSEQUENT ENCOUNTER: Primary | ICD-10-CM

## 2024-07-08 PROCEDURE — 99213 OFFICE O/P EST LOW 20 MIN: CPT | Performed by: PODIATRIST

## 2024-07-09 NOTE — PROGRESS NOTES
[facility] - PODIATRY    Today's Date: 07/09/24    Patient Name: Shanthi Akers  MRN: [id number]  CSN: [id number]  PCP: Romeo Mukherjee MD,   Referring Provider: No ref. provider found    SUBJECTIVE     Chief Complaint   Patient presents with    Left Ankle - Follow-up, Pain     Rupture of ligament of ankle, she says she is doing good, some ache at night     HPI: Shanthi Akers, a 63 y.o.female, presents to clinic.    Patient is here for follow-up overall doing well has returned to normal activity.  States that her ankle occasionally hurts but it is not stopping her from doing things.    Past Medical History:   Diagnosis Date    Ankle pain, left     Asthma     Broken bones     Chronic allergic rhinitis     Depression with anxiety     Essential hypertension     GERD (gastroesophageal reflux disease)     Hyperlipidemia     Migraine headache     Numbness and tingling of leg     right thigh    Psychiatric disorder     Seasonal allergies     Shortness of breath     Sinus trouble      Past Surgical History:   Procedure Laterality Date    BACK SURGERY      COLON SURGERY      COLONOSCOPY N/A 04/12/2022    Procedure: COLONOSCOPY;  Surgeon: Ishmael Ramirez MD;  Location: [facility] ENDOSCOPY;  Service: General;  Laterality: N/A;  NORMAL COLONOSCOPY    ENDOMETRIAL ABLATION      ENDOSCOPY      ENDOSCOPY N/A 04/12/2022    Procedure: ESOPHAGOGASTRODUODENOSCOPY WITH BIOPSIES;  Surgeon: Ishmael Ramirez MD;  Location: [facility] ENDOSCOPY;  Service: General;  Laterality: N/A;  HISTORY CONTEH'S ESOPHAGUS    HAMMER TOE REPAIR Left     CHI'S NEUROMA EXCISION Right      Family History   Problem Relation Age of Onset    Heart failure Mother     Hyperlipidemia Mother     Hypertension Mother     Rheumatic fever Mother     Stroke Mother     Heart attack Mother     Melanoma Brother     Malig Hyperthermia Neg Hx      Social History     Socioeconomic History    Marital status:    Tobacco Use    Smoking status: Never      Passive exposure: Never    Smokeless tobacco: Never   Vaping Use    Vaping status: Never Used   Substance and Sexual Activity    Alcohol use: Not Currently     Comment: occ wine    Drug use: Never    Sexual activity: Defer     Allergies   Allergen Reactions    Metronidazole Hives and Itching    Tetracycline Other (See Comments), Rash and Hives     Skin peeling on palms and feet    Meloxicam Nausea Only     Current Outpatient Medications   Medication Sig Dispense Refill    Advair Diskus 500-50 MCG/ACT DISKUS INHALE 1 PUFF TWICE A DAY 60 each 0    Airsupra 90-80 MCG/ACT aerosol       albuterol (ACCUNEB) 1.25 MG/3ML nebulizer solution Take 3 mL by nebulization Every 4 (Four) Hours As Needed for Wheezing or Shortness of Air (And cough). 20 each 0    aspirin 81 MG EC tablet Take 1 tablet by mouth Daily.      aspirin-acetaminophen-caffeine (EXCEDRIN MIGRAINE) 250-250-65 MG per tablet Take 1 tablet by mouth Every 6 (Six) Hours As Needed.      atorvastatin (LIPITOR) 40 MG tablet TAKE 1 TABLET BY MOUTH EVERY DAY 90 tablet 1    linaclotide (LINZESS) 145 MCG capsule capsule Take 1 capsule by mouth Daily.      losartan (COZAAR) 50 MG tablet       montelukast (SINGULAIR) 10 MG tablet TAKE 1 TABLET BY MOUTH DAILY 30 tablet 2    omeprazole (priLOSEC) 40 MG capsule TAKE 1 CAPSULE BY MOUTH EVERY DAY IN THE MORNING BEFORE BREAKFAST 90 capsule 1    PARoxetine (PAXIL) 40 MG tablet Take 1 tablet by mouth Every Morning. 90 tablet 1    Prenatal Multivit-Min-Fe-FA (Prenatal Forte) tablet Take 1 tablet by mouth Daily.      ProAir  (90 Base) MCG/ACT inhaler INHALE 2 PUFFS EVERY 4 (FOUR) HOURS AS NEEDED FOR WHEEZING. 8.5 g 2    rizatriptan (MAXALT) 10 MG tablet TAKE 1 TABLET BY MOUTH 1 (ONE) TIME AS NEEDED FOR MIGRAINE FOR UP TO 1 DOSE. MAY REPEAT IN 2 HOURS IF NEEDED 9 tablet 2    VITAMIN D, CHOLECALCIFEROL, PO Take  by mouth.       No current facility-administered medications for this visit.     Review of Systems    Constitutional: Negative.    HENT: Negative.     Eyes: Negative.    Respiratory: Negative.     Cardiovascular: Negative.    Gastrointestinal: Negative.    Endocrine: Negative.    Genitourinary: Negative.    Musculoskeletal: Negative.         Left ankle pain   Skin: Negative.    Allergic/Immunologic: Negative.    Neurological: Negative.    Hematological: Negative.    Psychiatric/Behavioral: Negative.     All other systems reviewed and are negative.      OBJECTIVE     Vitals:    07/08/24 1532   BP: 115/81   Pulse: 110   Temp: 97.5 °F (36.4 °C)   SpO2: 98%       WBC   Date Value Ref Range Status   06/06/2023 9.04 3.40 - 10.80 10*3/mm3 Final     RBC   Date Value Ref Range Status   06/06/2023 3.85 3.77 - 5.28 10*6/mm3 Final     Hemoglobin   Date Value Ref Range Status   06/06/2023 11.2 (L) 12.0 - 15.9 g/dL Final     Hematocrit   Date Value Ref Range Status   06/06/2023 33.3 (L) 34.0 - 46.6 % Final     MCV   Date Value Ref Range Status   06/06/2023 86.5 79.0 - 97.0 fL Final     MCH   Date Value Ref Range Status   06/06/2023 29.1 26.6 - 33.0 pg Final     MCHC   Date Value Ref Range Status   06/06/2023 33.6 31.5 - 35.7 g/dL Final     RDW   Date Value Ref Range Status   06/06/2023 13.5 12.3 - 15.4 % Final     RDW-SD   Date Value Ref Range Status   06/06/2023 41.2 37.0 - 54.0 fl Final     MPV   Date Value Ref Range Status   06/06/2023 9.5 6.0 - 12.0 fL Final     Platelets   Date Value Ref Range Status   06/06/2023 354 140 - 450 10*3/mm3 Final     Neutrophil %   Date Value Ref Range Status   06/06/2023 77.1 (H) 42.7 - 76.0 % Final     Lymphocyte %   Date Value Ref Range Status   06/06/2023 15.4 (L) 19.6 - 45.3 % Final     Monocyte %   Date Value Ref Range Status   06/06/2023 4.9 (L) 5.0 - 12.0 % Final     Eosinophil %   Date Value Ref Range Status   06/06/2023 1.3 0.3 - 6.2 % Final     Basophil %   Date Value Ref Range Status   06/06/2023 0.6 0.0 - 1.5 % Final     Immature Grans %   Date Value Ref Range Status   06/06/2023  0.7 (H) 0.0 - 0.5 % Final     Neutrophils Absolute   Date Value Ref Range Status   06/16/2023 4.8 1.7 - 6.0 x10(3)/ul Final     Lymphocytes, Absolute   Date Value Ref Range Status   06/06/2023 1.39 0.70 - 3.10 10*3/mm3 Final     Monocytes, Absolute   Date Value Ref Range Status   06/06/2023 0.44 0.10 - 0.90 10*3/mm3 Final     Eosinophils Absolute   Date Value Ref Range Status   06/16/2023 0.2 0.0 - 0.6 x10(3)/ul Final     Basophils Absolute   Date Value Ref Range Status   06/16/2023 0.1 0.0 - 0.3 x10(3)/ul Final     Immature Grans, Absolute   Date Value Ref Range Status   06/06/2023 0.06 (H) 0.00 - 0.05 10*3/mm3 Final     nRBC   Date Value Ref Range Status   06/06/2023 0.1 0.0 - 0.2 /100 WBC Final         Lab Results   Component Value Date    GLUCOSE 118 (H) 06/06/2023    BUN 15 06/06/2023    CREATININE 0.88 06/06/2023    EGFRIFNONA 54 (L) 12/28/2021    BCR 17.0 06/06/2023    K 4.1 06/06/2023    CO2 25.0 06/06/2023    CALCIUM 8.9 06/06/2023    ALBUMIN 4.00 10/19/2022    LABIL2 1.1 (L) 04/19/2021    AST 61 (H) 10/19/2022    ALT 62 (H) 10/19/2022       Patient seen in no apparent distress.      PHYSICAL EXAM:     Foot/Ankle Exam    GENERAL  Appearance:  appears stated age  Orientation:  AAOx3  Affect:  appropriate  Gait:  unimpaired  Assistance:  independent  Right shoe gear: casual shoe  Left shoe gear: casual shoe    VASCULAR     Right Foot Vascularity   Normal vascular exam    Dorsalis pedis:  2+  Posterior tibial:  2+  Skin temperature:  warm  Edema grading:  None  CFT:  < 3 seconds  Pedal hair growth:  Present  Varicosities:  none     Left Foot Vascularity   Normal vascular exam    Dorsalis pedis:  2+  Posterior tibial:  2+  Skin temperature:  warm  Edema grading:  None  CFT:  < 3 seconds  Pedal hair growth:  Present  Varicosities:  none     NEUROLOGIC     Right Foot Neurologic   Normal sensation    Light touch sensation: normal  Vibratory sensation: normal  Hot/Cold sensation: normal  Protective Sensation using  Thurston-Vahe Monofilament:   Sites intact: 10  Sites tested: 10     Left Foot Neurologic   Normal sensation    Light touch sensation: normal  Vibratory sensation: normal  Hot/Cold sensation:  normal  Protective Sensation using Thurston-Vahe Monofilament:   Sites intact: 10  Sites tested: 10    MUSCLE STRENGTH     Right Foot Muscle Strength   Foot dorsiflexion:  4  Foot plantar flexion:  4  Foot inversion:  4  Foot eversion:  4     Left Foot Muscle Strength   Foot dorsiflexion:  4  Foot plantar flexion:  4  Foot inversion:  4  Foot eversion:  4    RANGE OF MOTION     Right Foot Range of Motion   Foot and ankle ROM within normal limits       Left Foot Range of Motion   Foot and ankle ROM within normal limits      DERMATOLOGIC      Right Foot Dermatologic   Skin  Right foot skin is intact.      Left Foot Dermatologic   Skin  Left foot skin is intact.       RADIOLOGY:          No results found.    ASSESSMENT/PLAN     Diagnoses and all orders for this visit:    1. Closed nondisplaced fracture of lateral malleolus of left fibula with routine healing, subsequent encounter (Primary)    2. Rupture of ligament of ankle, left, subsequent encounter          Comprehensive lower extremity examination and evaluation was performed.    Doing well, continue increasing activity.  Return to clinic as needed    Discussed findings and treatment plan including risks, benefits, and treatment options with patient in detail. Patient agreed with treatment plan.    Medications and allergies reviewed.  Reviewed available lab values along with other pertinent labs.  These were discussed with the patient.    An After Visit Summary was printed and given to the patient at discharge, including (if requested) any available informative/educational handouts regarding diagnosis, treatment, or medications. All questions were answered to patient/family satisfaction. Should symptoms fail to improve or worsen they agree to call or return to clinic or  to go to the Emergency Department. Discussed the importance of following up with any needed screening tests/labs/specialist appointments and any requested follow-up recommended by me today. Importance of maintaining follow-up discussed and patient accepts that missed appointments can delay diagnosis and potentially lead to worsening of conditions.    No follow-ups on file., or sooner if acute issues arise.    This document has been electronically signed by Quan Chahal DPM on July 9, 2024 08:56 EDT

## 2024-08-05 NOTE — TELEPHONE ENCOUNTER
Caller: Shanthi Akers A    Relationship to patient: Self    Best call back number: [phone]    Patient is needing: PATIENT STATED SHE HAS HAD A COUGH SINCE HER DIAGNOSES WITH COVID BACK BEFORE CHRISTMAS. THE COUGH HAS NOT GOTTEN BETTER BUT SHE FEELS IT HAS GOTTEN WORSE. IT KEEPS HER UP AT NIGHT. PATIENT WOULD LIKE MEDICAL ADVICE.   
Scheduled patient for a sooner appt for cough.   
0

## 2025-02-03 ENCOUNTER — TELEPHONE (OUTPATIENT)
Dept: SURGERY | Facility: CLINIC | Age: 65
End: 2025-02-03
Payer: COMMERCIAL

## 2025-02-03 NOTE — TELEPHONE ENCOUNTER
PT RECALL LETTER FOR COLON CONSULT FOR DR HOLGUIN WAS RETURNED, CALLED PT TO CHECK ADDRESS, NO ANSWER, LMOM.

## 2025-02-05 NOTE — TELEPHONE ENCOUNTER
Caller: Shanthi Akers     Relationship: SELF    Best call back number: [phone]     What is your medical concern? PATIENT CALLED TO INFORM OFFICE SHE IS SEEING ANOTHER PROVIDER, AND DECLINED UPDATING HER CHART

## (undated) DEVICE — Device: Brand: DEFENDO AIR/WATER/SUCTION AND BIOPSY VALVE

## (undated) DEVICE — COLON KIT: Brand: MEDLINE INDUSTRIES, INC.

## (undated) DEVICE — EGD OR ERCP KIT: Brand: MEDLINE INDUSTRIES, INC.

## (undated) DEVICE — SINGLE-USE BIOPSY FORCEPS: Brand: RADIAL JAW 4

## (undated) DEVICE — SOL IRRG H2O PL/BG 1000ML STRL